# Patient Record
Sex: FEMALE | Race: OTHER
[De-identification: names, ages, dates, MRNs, and addresses within clinical notes are randomized per-mention and may not be internally consistent; named-entity substitution may affect disease eponyms.]

---

## 2017-02-17 ENCOUNTER — APPOINTMENT (OUTPATIENT)
Dept: FAMILY MEDICINE | Facility: CLINIC | Age: 52
End: 2017-02-17

## 2017-02-17 VITALS
SYSTOLIC BLOOD PRESSURE: 130 MMHG | WEIGHT: 267 LBS | HEIGHT: 64 IN | BODY MASS INDEX: 45.58 KG/M2 | DIASTOLIC BLOOD PRESSURE: 80 MMHG

## 2017-02-17 RX ORDER — ONDANSETRON 4 MG/1
4 TABLET, ORALLY DISINTEGRATING ORAL
Qty: 6 | Refills: 0 | Status: COMPLETED | COMMUNITY
Start: 2016-08-15

## 2017-02-18 ENCOUNTER — RX RENEWAL (OUTPATIENT)
Age: 52
End: 2017-02-18

## 2017-02-23 RX ORDER — MOMETASONE 50 UG/1
50 SPRAY, METERED NASAL TWICE DAILY
Qty: 1 | Refills: 0 | Status: DISCONTINUED | COMMUNITY
Start: 2017-02-18 | End: 2017-02-23

## 2017-03-31 ENCOUNTER — APPOINTMENT (OUTPATIENT)
Dept: FAMILY MEDICINE | Facility: CLINIC | Age: 52
End: 2017-03-31

## 2017-03-31 VITALS
BODY MASS INDEX: 44.53 KG/M2 | WEIGHT: 260.8 LBS | DIASTOLIC BLOOD PRESSURE: 68 MMHG | SYSTOLIC BLOOD PRESSURE: 124 MMHG | HEIGHT: 64 IN

## 2017-03-31 RX ORDER — VENLAFAXINE HYDROCHLORIDE 37.5 MG/1
37.5 CAPSULE, EXTENDED RELEASE ORAL DAILY
Qty: 7 | Refills: 0 | Status: DISCONTINUED | COMMUNITY
Start: 2017-02-17 | End: 2017-03-31

## 2017-03-31 RX ORDER — VENLAFAXINE HYDROCHLORIDE 75 MG/1
75 CAPSULE, EXTENDED RELEASE ORAL DAILY
Qty: 30 | Refills: 1 | Status: DISCONTINUED | COMMUNITY
Start: 2017-02-17 | End: 2017-03-31

## 2017-04-24 ENCOUNTER — RX RENEWAL (OUTPATIENT)
Age: 52
End: 2017-04-24

## 2017-04-26 ENCOUNTER — APPOINTMENT (OUTPATIENT)
Dept: FAMILY MEDICINE | Facility: CLINIC | Age: 52
End: 2017-04-26

## 2017-04-26 VITALS
DIASTOLIC BLOOD PRESSURE: 78 MMHG | BODY MASS INDEX: 44.22 KG/M2 | SYSTOLIC BLOOD PRESSURE: 128 MMHG | WEIGHT: 259 LBS | HEIGHT: 64 IN

## 2017-06-05 ENCOUNTER — LABORATORY RESULT (OUTPATIENT)
Age: 52
End: 2017-06-05

## 2017-06-05 ENCOUNTER — APPOINTMENT (OUTPATIENT)
Dept: FAMILY MEDICINE | Facility: CLINIC | Age: 52
End: 2017-06-05

## 2017-06-05 VITALS
DIASTOLIC BLOOD PRESSURE: 80 MMHG | HEIGHT: 64 IN | SYSTOLIC BLOOD PRESSURE: 130 MMHG | WEIGHT: 257 LBS | BODY MASS INDEX: 43.87 KG/M2

## 2017-06-07 ENCOUNTER — RESULT REVIEW (OUTPATIENT)
Age: 52
End: 2017-06-07

## 2017-06-10 ENCOUNTER — MEDICATION RENEWAL (OUTPATIENT)
Age: 52
End: 2017-06-10

## 2017-06-26 ENCOUNTER — APPOINTMENT (OUTPATIENT)
Dept: FAMILY MEDICINE | Facility: CLINIC | Age: 52
End: 2017-06-26

## 2017-06-26 VITALS
DIASTOLIC BLOOD PRESSURE: 80 MMHG | HEIGHT: 64 IN | SYSTOLIC BLOOD PRESSURE: 130 MMHG | WEIGHT: 245 LBS | BODY MASS INDEX: 41.83 KG/M2

## 2017-07-19 ENCOUNTER — APPOINTMENT (OUTPATIENT)
Dept: FAMILY MEDICINE | Facility: CLINIC | Age: 52
End: 2017-07-19

## 2017-07-19 VITALS
WEIGHT: 240 LBS | BODY MASS INDEX: 40.97 KG/M2 | HEIGHT: 64 IN | SYSTOLIC BLOOD PRESSURE: 130 MMHG | DIASTOLIC BLOOD PRESSURE: 80 MMHG

## 2017-08-09 ENCOUNTER — APPOINTMENT (OUTPATIENT)
Dept: FAMILY MEDICINE | Facility: CLINIC | Age: 52
End: 2017-08-09
Payer: COMMERCIAL

## 2017-08-09 VITALS
HEART RATE: 71 BPM | WEIGHT: 242.25 LBS | RESPIRATION RATE: 17 BRPM | HEIGHT: 64 IN | TEMPERATURE: 99.1 F | DIASTOLIC BLOOD PRESSURE: 83 MMHG | OXYGEN SATURATION: 99 % | SYSTOLIC BLOOD PRESSURE: 133 MMHG | BODY MASS INDEX: 41.36 KG/M2

## 2017-08-09 PROCEDURE — 99214 OFFICE O/P EST MOD 30 MIN: CPT | Mod: 25

## 2017-08-09 PROCEDURE — 87880 STREP A ASSAY W/OPTIC: CPT | Mod: QW

## 2017-08-10 ENCOUNTER — RESULT CHARGE (OUTPATIENT)
Age: 52
End: 2017-08-10

## 2017-08-10 LAB — S PYO AG SPEC QL IA: NEGATIVE

## 2017-09-12 ENCOUNTER — LABORATORY RESULT (OUTPATIENT)
Age: 52
End: 2017-09-12

## 2017-09-13 ENCOUNTER — APPOINTMENT (OUTPATIENT)
Dept: FAMILY MEDICINE | Facility: CLINIC | Age: 52
End: 2017-09-13
Payer: COMMERCIAL

## 2017-09-13 VITALS
WEIGHT: 239 LBS | DIASTOLIC BLOOD PRESSURE: 84 MMHG | HEIGHT: 64 IN | SYSTOLIC BLOOD PRESSURE: 130 MMHG | BODY MASS INDEX: 40.8 KG/M2

## 2017-09-13 DIAGNOSIS — J06.9 ACUTE UPPER RESPIRATORY INFECTION, UNSPECIFIED: ICD-10-CM

## 2017-09-13 DIAGNOSIS — Z87.09 PERSONAL HISTORY OF OTHER DISEASES OF THE RESPIRATORY SYSTEM: ICD-10-CM

## 2017-09-13 PROCEDURE — 36415 COLL VENOUS BLD VENIPUNCTURE: CPT

## 2017-09-13 PROCEDURE — 99214 OFFICE O/P EST MOD 30 MIN: CPT | Mod: 25

## 2017-09-13 RX ORDER — AZITHROMYCIN 250 MG/1
250 TABLET, FILM COATED ORAL
Qty: 6 | Refills: 0 | Status: DISCONTINUED | COMMUNITY
Start: 2017-08-09 | End: 2017-09-13

## 2017-09-13 RX ORDER — CIPROFLOXACIN HYDROCHLORIDE 500 MG/1
500 TABLET, FILM COATED ORAL
Qty: 6 | Refills: 0 | Status: DISCONTINUED | COMMUNITY
Start: 2017-08-09 | End: 2017-09-13

## 2017-09-15 LAB
25(OH)D3 SERPL-MCNC: 25.3 NG/ML
ALBUMIN SERPL ELPH-MCNC: 4.4 G/DL
ALP BLD-CCNC: 108 U/L
ALT SERPL-CCNC: 7 U/L
AMYLASE/CREAT SERPL: 61 U/L
ANION GAP SERPL CALC-SCNC: 18 MMOL/L
AST SERPL-CCNC: 16 U/L
BASOPHILS # BLD AUTO: 0.03 K/UL
BASOPHILS NFR BLD AUTO: 0.4 %
BILIRUB SERPL-MCNC: 0.5 MG/DL
BUN SERPL-MCNC: 15 MG/DL
CALCIUM SERPL-MCNC: 10 MG/DL
CHLORIDE SERPL-SCNC: 103 MMOL/L
CHOLEST SERPL-MCNC: 255 MG/DL
CHOLEST/HDLC SERPL: 3.6 RATIO
CO2 SERPL-SCNC: 23 MMOL/L
CREAT SERPL-MCNC: 0.79 MG/DL
EOSINOPHIL # BLD AUTO: 0.05 K/UL
EOSINOPHIL NFR BLD AUTO: 0.7 %
GLUCOSE SERPL-MCNC: 91 MG/DL
HCT VFR BLD CALC: 40.3 %
HDLC SERPL-MCNC: 71 MG/DL
HGB BLD-MCNC: 14 G/DL
IMM GRANULOCYTES NFR BLD AUTO: 0.1 %
LDLC SERPL CALC-MCNC: 160 MG/DL
LPL SERPL-CCNC: 47 U/L
LYMPHOCYTES # BLD AUTO: 1.91 K/UL
LYMPHOCYTES NFR BLD AUTO: 28.4 %
MAN DIFF?: NORMAL
MCHC RBC-ENTMCNC: 31.6 PG
MCHC RBC-ENTMCNC: 34.7 GM/DL
MCV RBC AUTO: 91 FL
MONOCYTES # BLD AUTO: 0.31 K/UL
MONOCYTES NFR BLD AUTO: 4.6 %
NEUTROPHILS # BLD AUTO: 4.42 K/UL
NEUTROPHILS NFR BLD AUTO: 65.8 %
PLATELET # BLD AUTO: 300 K/UL
POTASSIUM SERPL-SCNC: 3.3 MMOL/L
PROT SERPL-MCNC: 7.5 G/DL
RBC # BLD: 4.43 M/UL
RBC # FLD: 13.1 %
SODIUM SERPL-SCNC: 144 MMOL/L
TRIGL SERPL-MCNC: 121 MG/DL
TSH SERPL-ACNC: 1.62 UIU/ML
VIT B12 SERPL-MCNC: 298 PG/ML
WBC # FLD AUTO: 6.73 K/UL

## 2017-10-13 ENCOUNTER — APPOINTMENT (OUTPATIENT)
Dept: FAMILY MEDICINE | Facility: CLINIC | Age: 52
End: 2017-10-13
Payer: COMMERCIAL

## 2017-10-13 VITALS
WEIGHT: 243 LBS | DIASTOLIC BLOOD PRESSURE: 74 MMHG | SYSTOLIC BLOOD PRESSURE: 120 MMHG | HEIGHT: 64 IN | BODY MASS INDEX: 41.48 KG/M2

## 2017-10-13 PROCEDURE — 99214 OFFICE O/P EST MOD 30 MIN: CPT

## 2017-10-13 RX ORDER — POTASSIUM CHLORIDE 1500 MG/1
20 TABLET, EXTENDED RELEASE ORAL DAILY
Qty: 3 | Refills: 0 | Status: DISCONTINUED | COMMUNITY
Start: 2017-09-15 | End: 2017-10-13

## 2017-10-13 RX ORDER — PANTOPRAZOLE 20 MG/1
20 TABLET, DELAYED RELEASE ORAL DAILY
Qty: 90 | Refills: 0 | Status: DISCONTINUED | COMMUNITY
Start: 2017-08-09 | End: 2017-10-13

## 2017-10-13 RX ORDER — CIPROFLOXACIN HYDROCHLORIDE 500 MG/1
500 TABLET, FILM COATED ORAL
Qty: 6 | Refills: 0 | Status: DISCONTINUED | COMMUNITY
Start: 2017-09-13 | End: 2017-10-13

## 2017-10-17 ENCOUNTER — RESULT REVIEW (OUTPATIENT)
Age: 52
End: 2017-10-17

## 2017-11-14 ENCOUNTER — APPOINTMENT (OUTPATIENT)
Dept: FAMILY MEDICINE | Facility: CLINIC | Age: 52
End: 2017-11-14
Payer: COMMERCIAL

## 2017-11-14 VITALS
DIASTOLIC BLOOD PRESSURE: 90 MMHG | HEART RATE: 73 BPM | BODY MASS INDEX: 40.63 KG/M2 | OXYGEN SATURATION: 99 % | SYSTOLIC BLOOD PRESSURE: 130 MMHG | WEIGHT: 238 LBS | HEIGHT: 64 IN

## 2017-11-14 DIAGNOSIS — Z92.29 PERSONAL HISTORY OF OTHER DRUG THERAPY: ICD-10-CM

## 2017-11-14 PROCEDURE — 99214 OFFICE O/P EST MOD 30 MIN: CPT

## 2017-11-27 LAB
25(OH)D3 SERPL-MCNC: 23.8 NG/ML
ANION GAP SERPL CALC-SCNC: 16 MMOL/L
BUN SERPL-MCNC: 22 MG/DL
CALCIUM SERPL-MCNC: 10.1 MG/DL
CHLORIDE SERPL-SCNC: 98 MMOL/L
CO2 SERPL-SCNC: 23 MMOL/L
CREAT SERPL-MCNC: 0.82 MG/DL
GLUCOSE SERPL-MCNC: 80 MG/DL
POTASSIUM SERPL-SCNC: 4 MMOL/L
SODIUM SERPL-SCNC: 137 MMOL/L

## 2017-12-12 ENCOUNTER — APPOINTMENT (OUTPATIENT)
Dept: FAMILY MEDICINE | Facility: CLINIC | Age: 52
End: 2017-12-12
Payer: COMMERCIAL

## 2017-12-12 VITALS
SYSTOLIC BLOOD PRESSURE: 144 MMHG | OXYGEN SATURATION: 97 % | HEIGHT: 64 IN | BODY MASS INDEX: 41.83 KG/M2 | WEIGHT: 245 LBS | HEART RATE: 77 BPM | DIASTOLIC BLOOD PRESSURE: 100 MMHG

## 2017-12-12 VITALS — SYSTOLIC BLOOD PRESSURE: 120 MMHG | DIASTOLIC BLOOD PRESSURE: 80 MMHG

## 2017-12-12 DIAGNOSIS — F41.9 ANXIETY DISORDER, UNSPECIFIED: ICD-10-CM

## 2017-12-12 PROCEDURE — 99214 OFFICE O/P EST MOD 30 MIN: CPT

## 2018-01-09 ENCOUNTER — APPOINTMENT (OUTPATIENT)
Dept: FAMILY MEDICINE | Facility: CLINIC | Age: 53
End: 2018-01-09
Payer: COMMERCIAL

## 2018-01-09 VITALS
SYSTOLIC BLOOD PRESSURE: 120 MMHG | DIASTOLIC BLOOD PRESSURE: 80 MMHG | OXYGEN SATURATION: 98 % | WEIGHT: 245 LBS | TEMPERATURE: 98.2 F | BODY MASS INDEX: 41.83 KG/M2 | HEART RATE: 69 BPM | HEIGHT: 64 IN

## 2018-01-09 PROCEDURE — 99214 OFFICE O/P EST MOD 30 MIN: CPT

## 2018-01-25 ENCOUNTER — TRANSCRIPTION ENCOUNTER (OUTPATIENT)
Age: 53
End: 2018-01-25

## 2018-02-05 ENCOUNTER — TRANSCRIPTION ENCOUNTER (OUTPATIENT)
Age: 53
End: 2018-02-05

## 2018-02-06 ENCOUNTER — APPOINTMENT (OUTPATIENT)
Dept: FAMILY MEDICINE | Facility: CLINIC | Age: 53
End: 2018-02-06
Payer: COMMERCIAL

## 2018-02-06 VITALS
WEIGHT: 254 LBS | HEIGHT: 64 IN | SYSTOLIC BLOOD PRESSURE: 124 MMHG | BODY MASS INDEX: 43.36 KG/M2 | DIASTOLIC BLOOD PRESSURE: 74 MMHG

## 2018-02-06 PROCEDURE — 99214 OFFICE O/P EST MOD 30 MIN: CPT

## 2018-02-07 ENCOUNTER — RX RENEWAL (OUTPATIENT)
Age: 53
End: 2018-02-07

## 2018-03-06 ENCOUNTER — APPOINTMENT (OUTPATIENT)
Dept: FAMILY MEDICINE | Facility: CLINIC | Age: 53
End: 2018-03-06
Payer: COMMERCIAL

## 2018-03-06 VITALS
OXYGEN SATURATION: 98 % | SYSTOLIC BLOOD PRESSURE: 106 MMHG | DIASTOLIC BLOOD PRESSURE: 84 MMHG | HEIGHT: 64 IN | BODY MASS INDEX: 43.19 KG/M2 | WEIGHT: 253 LBS | HEART RATE: 92 BPM

## 2018-03-06 PROCEDURE — 99214 OFFICE O/P EST MOD 30 MIN: CPT

## 2018-03-06 RX ORDER — CEPHALEXIN 500 MG/1
500 CAPSULE ORAL 3 TIMES DAILY
Qty: 30 | Refills: 0 | Status: COMPLETED | COMMUNITY
Start: 2018-02-06 | End: 2018-03-06

## 2018-04-04 ENCOUNTER — APPOINTMENT (OUTPATIENT)
Dept: FAMILY MEDICINE | Facility: CLINIC | Age: 53
End: 2018-04-04
Payer: COMMERCIAL

## 2018-04-04 VITALS
DIASTOLIC BLOOD PRESSURE: 68 MMHG | BODY MASS INDEX: 42.17 KG/M2 | SYSTOLIC BLOOD PRESSURE: 118 MMHG | WEIGHT: 247 LBS | HEIGHT: 64 IN

## 2018-04-04 PROCEDURE — 99214 OFFICE O/P EST MOD 30 MIN: CPT

## 2018-04-04 RX ORDER — FLUTICASONE PROPIONATE 50 UG/1
50 SPRAY, METERED NASAL DAILY
Qty: 1 | Refills: 2 | Status: DISCONTINUED | COMMUNITY
Start: 2017-02-23 | End: 2018-04-04

## 2018-05-02 ENCOUNTER — APPOINTMENT (OUTPATIENT)
Dept: FAMILY MEDICINE | Facility: CLINIC | Age: 53
End: 2018-05-02
Payer: COMMERCIAL

## 2018-05-02 VITALS
WEIGHT: 256 LBS | BODY MASS INDEX: 43.71 KG/M2 | HEIGHT: 64 IN | SYSTOLIC BLOOD PRESSURE: 114 MMHG | DIASTOLIC BLOOD PRESSURE: 76 MMHG

## 2018-05-02 PROCEDURE — 99214 OFFICE O/P EST MOD 30 MIN: CPT

## 2018-05-02 RX ORDER — SODIUM CHLORIDE 0.65 %
0.65 AEROSOL, SPRAY (ML) NASAL
Qty: 3 | Refills: 0 | Status: DISCONTINUED | OUTPATIENT
Start: 2018-04-04 | End: 2018-05-02

## 2018-05-25 ENCOUNTER — CHART COPY (OUTPATIENT)
Age: 53
End: 2018-05-25

## 2018-06-05 ENCOUNTER — APPOINTMENT (OUTPATIENT)
Dept: FAMILY MEDICINE | Facility: CLINIC | Age: 53
End: 2018-06-05
Payer: COMMERCIAL

## 2018-06-05 VITALS
BODY MASS INDEX: 43.54 KG/M2 | SYSTOLIC BLOOD PRESSURE: 120 MMHG | WEIGHT: 255 LBS | HEIGHT: 64 IN | DIASTOLIC BLOOD PRESSURE: 80 MMHG | HEART RATE: 67 BPM | OXYGEN SATURATION: 96 %

## 2018-06-05 PROCEDURE — 99214 OFFICE O/P EST MOD 30 MIN: CPT

## 2018-06-05 RX ORDER — ONDANSETRON 4 MG/1
4 TABLET ORAL EVERY 4 HOURS
Qty: 6 | Refills: 0 | Status: DISCONTINUED | COMMUNITY
Start: 2018-01-09 | End: 2018-06-05

## 2018-06-06 NOTE — PHYSICAL EXAM
[No Acute Distress] : no acute distress [Normal Sclera/Conjunctiva] : normal sclera/conjunctiva [Normal Oropharynx] : the oropharynx was normal [Supple] : supple [No Lymphadenopathy] : no lymphadenopathy [No Respiratory Distress] : no respiratory distress  [Clear to Auscultation] : lungs were clear to auscultation bilaterally [Normal Rate] : normal rate  [Regular Rhythm] : with a regular rhythm [Normal S1, S2] : normal S1 and S2 [No Murmur] : no murmur heard [No Edema] : there was no peripheral edema [Normal Affect] : the affect was normal [Normal Mood] : the mood was normal [Normal Insight/Judgement] : insight and judgment were intact [de-identified] : Mild increase in lumbar lordosis, nontender to palpation.

## 2018-06-06 NOTE — REVIEW OF SYSTEMS
[Joint Pain] : no joint pain [Back Pain] : back pain [Anxiety] : anxiety [Depression] : no depression [Negative] : Respiratory [FreeTextEntry7] : See HPI

## 2018-06-06 NOTE — HISTORY OF PRESENT ILLNESS
[FreeTextEntry1] : Patient here for medication renewal for Alprazolam,Carisoprodol,Triamterene and Omeprazole. [de-identified] : Anxiety has been controlled on current regimen. Mood has been stable.\par Using Carisoprodol with good results for low back spasm.\par Gerd has been controlled.\par Had EUS with GI on 5/23/18 for evaluation of pancreatic cyst and evaluation of GERD.

## 2018-06-06 NOTE — DATA REVIEWED
[FreeTextEntry1] : Endoscopy from 5/23/18 reviewed. gastric polyps, appeared benign. Pancreatic cyst 3.1 mm head, stable. Fatty liver.

## 2018-07-03 ENCOUNTER — APPOINTMENT (OUTPATIENT)
Dept: FAMILY MEDICINE | Facility: CLINIC | Age: 53
End: 2018-07-03
Payer: COMMERCIAL

## 2018-07-03 VITALS
SYSTOLIC BLOOD PRESSURE: 120 MMHG | BODY MASS INDEX: 45.41 KG/M2 | HEIGHT: 64 IN | HEART RATE: 54 BPM | WEIGHT: 266 LBS | DIASTOLIC BLOOD PRESSURE: 80 MMHG | OXYGEN SATURATION: 99 %

## 2018-07-03 DIAGNOSIS — K44.9 DIAPHRAGMATIC HERNIA W/OUT OBSTRUCTION OR GANGRENE: ICD-10-CM

## 2018-07-03 PROCEDURE — 99214 OFFICE O/P EST MOD 30 MIN: CPT

## 2018-07-03 NOTE — HISTORY OF PRESENT ILLNESS
[FreeTextEntry1] : Patient following up on her Anxiety, Pt needs refills on Zolpidem, Xanax, and her EpiPen. Pt also states she needs a script for antibitocis for a dental procedure she is having [de-identified] : Pt experiences reg anxiety with intermittent panic attacks. Her symptoms are controlled on her current regimen.\par She is continuing to exercise and modify diet.\par Had recent bw.\par Epipen . She is severely allergic to bees.\par Needs dental prophylaxis given hx of left knee arthroplasty.

## 2018-07-03 NOTE — REVIEW OF SYSTEMS
[Joint Pain] : joint pain [Anxiety] : anxiety [Negative] : Integumentary [FreeTextEntry9] : left knee [de-identified] : stable

## 2018-07-03 NOTE — ASSESSMENT
[FreeTextEntry1] : LDL mildly suboptimal. Pt to cont with dietary efforts.\par Cont current regimen.\par F/U 1 month.\par We discussed recent data showing increased association of dementia with long term benzodiazepine use on one study. Recommend pt attempt to wean alprazolam to no more than 1 per day and to consider SSRI tx. Will address again at f/u.

## 2018-07-03 NOTE — PHYSICAL EXAM
[No Acute Distress] : no acute distress [Normal Sclera/Conjunctiva] : normal sclera/conjunctiva [Normal Oropharynx] : the oropharynx was normal [Supple] : supple [No Lymphadenopathy] : no lymphadenopathy [No Respiratory Distress] : no respiratory distress  [Clear to Auscultation] : lungs were clear to auscultation bilaterally [Normal Rate] : normal rate  [Regular Rhythm] : with a regular rhythm [Normal S1, S2] : normal S1 and S2 [No Murmur] : no murmur heard [No Edema] : there was no peripheral edema [Normal Anterior Cervical Nodes] : no anterior cervical lymphadenopathy [Normal Affect] : the affect was normal [Normal Mood] : the mood was normal [Normal Insight/Judgement] : insight and judgment were intact [de-identified] : Mild increase in lumbar lordosis, nontender to palpation.

## 2018-08-03 ENCOUNTER — APPOINTMENT (OUTPATIENT)
Dept: FAMILY MEDICINE | Facility: CLINIC | Age: 53
End: 2018-08-03
Payer: COMMERCIAL

## 2018-08-03 VITALS
DIASTOLIC BLOOD PRESSURE: 70 MMHG | BODY MASS INDEX: 44.56 KG/M2 | OXYGEN SATURATION: 98 % | WEIGHT: 261 LBS | HEIGHT: 64 IN | SYSTOLIC BLOOD PRESSURE: 120 MMHG | HEART RATE: 77 BPM

## 2018-08-03 PROCEDURE — 99213 OFFICE O/P EST LOW 20 MIN: CPT

## 2018-08-03 RX ORDER — AZELASTINE HYDROCHLORIDE 205.5 UG/1
0.15 SPRAY, METERED NASAL TWICE DAILY
Qty: 1 | Refills: 2 | Status: DISCONTINUED | COMMUNITY
Start: 2018-04-04 | End: 2018-08-03

## 2018-08-03 NOTE — PLAN
[FreeTextEntry1] : Pt is stable.\par Cont current regimen.\par Recommend pt do trial of decreasing alprazolam as tolerated.

## 2018-08-03 NOTE — REVIEW OF SYSTEMS
[Joint Pain] : joint pain [Anxiety] : anxiety [Depression] : no depression [Negative] : Integumentary

## 2018-08-03 NOTE — HISTORY OF PRESENT ILLNESS
[FreeTextEntry1] : Patient here for refill on her medication,pt needs Alprazolam refilled. Also pt states she needs Amoxicillin refilled because she has a dental procedure on  08/12/18. [de-identified] : Pt with anxiety episodes daily, relief with Alprazolam. Occ panic. Stressors with some family members. Pt feeling overall positive. She continues to exercise regularly.

## 2018-08-03 NOTE — PHYSICAL EXAM
[No Acute Distress] : no acute distress [Normal Sclera/Conjunctiva] : normal sclera/conjunctiva [Normal Oropharynx] : the oropharynx was normal [Supple] : supple [No Lymphadenopathy] : no lymphadenopathy [No Respiratory Distress] : no respiratory distress  [Clear to Auscultation] : lungs were clear to auscultation bilaterally [Normal Rate] : normal rate  [Regular Rhythm] : with a regular rhythm [Normal S1, S2] : normal S1 and S2 [No Murmur] : no murmur heard [No Edema] : there was no peripheral edema [Normal Affect] : the affect was normal [Normal Mood] : the mood was normal [Normal Insight/Judgement] : insight and judgment were intact

## 2018-09-04 ENCOUNTER — APPOINTMENT (OUTPATIENT)
Dept: FAMILY MEDICINE | Facility: CLINIC | Age: 53
End: 2018-09-04
Payer: COMMERCIAL

## 2018-09-04 VITALS
HEIGHT: 64 IN | WEIGHT: 268 LBS | BODY MASS INDEX: 45.75 KG/M2 | DIASTOLIC BLOOD PRESSURE: 80 MMHG | TEMPERATURE: 98 F | OXYGEN SATURATION: 99 % | SYSTOLIC BLOOD PRESSURE: 110 MMHG | HEART RATE: 68 BPM

## 2018-09-04 DIAGNOSIS — Z86.19 PERSONAL HISTORY OF OTHER INFECTIOUS AND PARASITIC DISEASES: ICD-10-CM

## 2018-09-04 DIAGNOSIS — L08.9 PERSONAL HISTORY OF OTHER INFECTIOUS AND PARASITIC DISEASES: ICD-10-CM

## 2018-09-04 PROCEDURE — 99214 OFFICE O/P EST MOD 30 MIN: CPT

## 2018-09-04 NOTE — PHYSICAL EXAM
[No Acute Distress] : no acute distress [Normal Sclera/Conjunctiva] : normal sclera/conjunctiva [Normal Oropharynx] : the oropharynx was normal [Supple] : supple [No Lymphadenopathy] : no lymphadenopathy [No Respiratory Distress] : no respiratory distress  [Clear to Auscultation] : lungs were clear to auscultation bilaterally [Normal Rate] : normal rate  [Regular Rhythm] : with a regular rhythm [Normal S1, S2] : normal S1 and S2 [No Murmur] : no murmur heard [Soft] : abdomen soft [Non Tender] : non-tender [Non-distended] : non-distended [No Masses] : no abdominal mass palpated [Normal Bowel Sounds] : normal bowel sounds [Normal Anterior Cervical Nodes] : no anterior cervical lymphadenopathy [No Focal Deficits] : no focal deficits [Normal Affect] : the affect was normal [Normal Mood] : the mood was normal [Normal Insight/Judgement] : insight and judgment were intact [de-identified] : Mild increase in lumbar lordosis. Paralumbar tone mild increase

## 2018-09-04 NOTE — ASSESSMENT
[FreeTextEntry1] : Gastroenteritis improving. Increase fluids.\par Return to office if symptoms worsen or persist.\par Trial of Linzess for CIC.\par

## 2018-09-04 NOTE — REVIEW OF SYSTEMS
[Fever] : no fever [Abdominal Pain] : no abdominal pain [Nausea] : no nausea [Constipation] : constipation [Diarrhea] : diarrhea [Vomiting] : vomiting [Joint Pain] : joint pain [Back Pain] : no back pain [Negative] : Genitourinary

## 2018-09-04 NOTE — HISTORY OF PRESENT ILLNESS
[FreeTextEntry1] : Pt is complaining of  vomiting since last night. No fever or diarrhea. Vomited 2x and dry heaves x 1, feeling better. No abd pain. + constipation, chronic. No relief with Metamucil or Miralax.\par Patient here for medication refills. She is requesting a refill on her Alprazolam, Zolpidem and Carisoprodol.\par  [de-identified] : Anxiety is stable. Adequate relief with alprazolam.\par Insomnia persists.\par Low back pain and spasm intermittent. Exercising regularly.

## 2018-09-17 ENCOUNTER — RX RENEWAL (OUTPATIENT)
Age: 53
End: 2018-09-17

## 2018-10-03 ENCOUNTER — APPOINTMENT (OUTPATIENT)
Dept: FAMILY MEDICINE | Facility: CLINIC | Age: 53
End: 2018-10-03
Payer: COMMERCIAL

## 2018-10-03 VITALS
DIASTOLIC BLOOD PRESSURE: 74 MMHG | BODY MASS INDEX: 45.24 KG/M2 | WEIGHT: 265 LBS | SYSTOLIC BLOOD PRESSURE: 120 MMHG | HEIGHT: 64 IN

## 2018-10-03 PROCEDURE — 99214 OFFICE O/P EST MOD 30 MIN: CPT

## 2018-10-03 RX ORDER — AMOXICILLIN 500 MG/1
500 CAPSULE ORAL
Qty: 4 | Refills: 3 | Status: COMPLETED | COMMUNITY
Start: 2018-08-03 | End: 2018-10-03

## 2018-10-03 NOTE — COUNSELING
[Weight management counseling provided] : Weight management [Behavioral health counseling provided] : behavioral health  [Target Wt Loss Goal ___] : Target weight loss goal [unfilled] lbs [Weight Self Once Weekly] : Weight self once weekly [Decrease Portions] : Decrease food portions [___ min/wk activity recommended] : [unfilled] min/wk activity recommended [Walking] : Walking [Sleep ___ hours/day] : Sleep [unfilled] hours/day [Engage in a relaxing activity] : Engage in a relaxing activity [Plan in advance] : Plan in advance [None] : None [ - Behavioral Counseling for Obesity (Face-to-Face for 15 Minutes)] : Behavioral Counseling for Obesity (Face-to-Face for 15 Minutes) [ - Annual Depression Screening] : Annual Depression Screening

## 2018-10-03 NOTE — ASSESSMENT
[FreeTextEntry1] : imp: 52 y/o obese F with anxiety and OA seen today for med refills \par Plan: IStop checked no issues \par renew meds for anxiety no risk of diversion \par advised weight loss and NSAIDS as needed for OA knee pain \par f/u with Dr Lucero jolley

## 2018-10-03 NOTE — REVIEW OF SYSTEMS
[Fever] : no fever [Chills] : no chills [Fatigue] : no fatigue [Hot Flashes] : no hot flashes [Night Sweats] : no night sweats [Recent Change In Weight] : ~T no recent weight change [Chest Pain] : no chest pain [Palpitations] : no palpitations [Leg Claudication] : no leg claudication [Lower Ext Edema] : no lower extremity edema [Orthopnea] : no orthopnea [Paroysmal Nocturnal Dyspnea] : no paroysmal nocturnal dyspnea [Shortness Of Breath] : no shortness of breath [Wheezing] : no wheezing [Cough] : no cough [Dyspnea on Exertion] : no dyspnea on exertion [Abdominal Pain] : no abdominal pain [Nausea] : no nausea [Constipation] : no constipation [Diarrhea] : diarrhea [Vomiting] : no vomiting [Heartburn] : no heartburn [Melena] : no melena [Joint Pain] : joint pain [Joint Stiffness] : no joint stiffness [Joint Swelling] : no joint swelling [Muscle Weakness] : no muscle weakness [Muscle Pain] : no muscle pain [Back Pain] : no back pain [Suicidal] : not suicidal [Insomnia] : no insomnia [Anxiety] : anxiety [Depression] : no depression [Easy Bleeding] : no easy bleeding [Easy Bruising] : no easy bruising [Swollen Glands] : no swollen glands [Negative] : Neurological

## 2018-10-03 NOTE — HEALTH RISK ASSESSMENT
[No falls in past year] : Patient reported no falls in the past year [0] : 2) Feeling down, depressed, or hopeless: Not at all (0) [] : No [de-identified] : none [[id number]] : 0

## 2018-10-03 NOTE — HISTORY OF PRESENT ILLNESS
[FreeTextEntry1] : Pt here for Rx renewals including  Alprazolam.  [de-identified] : has hx of Anxiety that started on 911 and is managed long term by Alprazolam and is doing well on this medication \par she is also using NSAIDS for OA as needed \par her eczema flairs occasionally and she uses RX cream for that and OTC preps \par she is in her usual state of health

## 2018-10-04 ENCOUNTER — APPOINTMENT (OUTPATIENT)
Dept: FAMILY MEDICINE | Facility: CLINIC | Age: 53
End: 2018-10-04
Payer: COMMERCIAL

## 2018-10-04 VITALS
OXYGEN SATURATION: 98 % | DIASTOLIC BLOOD PRESSURE: 76 MMHG | RESPIRATION RATE: 16 BRPM | WEIGHT: 265 LBS | SYSTOLIC BLOOD PRESSURE: 116 MMHG | HEIGHT: 64 IN | HEART RATE: 86 BPM | BODY MASS INDEX: 45.24 KG/M2

## 2018-10-04 DIAGNOSIS — S82.53XA DISPLACED FRACTURE OF MEDIAL MALLEOLUS OF UNSPECIFIED TIBIA, INITIAL ENCOUNTER FOR CLOSED FRACTURE: ICD-10-CM

## 2018-10-04 DIAGNOSIS — Z87.09 PERSONAL HISTORY OF OTHER DISEASES OF THE RESPIRATORY SYSTEM: ICD-10-CM

## 2018-10-04 DIAGNOSIS — Z79.2 LONG TERM (CURRENT) USE OF ANTIBIOTICS: ICD-10-CM

## 2018-10-04 DIAGNOSIS — T63.441A TOXIC EFFECT OF VENOM OF BEES, ACCIDENTAL (UNINTENTIONAL), INITIAL ENCOUNTER: ICD-10-CM

## 2018-10-04 PROCEDURE — 99213 OFFICE O/P EST LOW 20 MIN: CPT

## 2018-10-04 NOTE — HISTORY OF PRESENT ILLNESS
[Congestion] : congestion [Cough] : cough [Sore Throat] : sore throat [Earache (L)] : pain in left ear [Moderate] : moderate [___ Weeks ago] :  [unfilled] weeks ago [Constant] : constant [Chills] : chills [Fever] : fever [FreeTextEntry2] : nausea [FreeTextEntry8] : pt notes pnd, congestion, cough w color, and l ear clogged and been going on for one week, pt notes not improving, pt notes feels restricted in chest, pt without hx of asthma, pt notes fever over weekend, pt doing well,pt cough was forceful, and she had some brief sob this am, pt reports she brought up her cold yesterday and doc would not address as per pt . pt asked to speak w someone in office about yesterdays vist

## 2018-10-04 NOTE — PHYSICAL EXAM
[No Acute Distress] : no acute distress [No Respiratory Distress] : no respiratory distress  [Regular Rhythm] : with a regular rhythm [Soft] : abdomen soft

## 2018-10-04 NOTE — REVIEW OF SYSTEMS
[Shortness Of Breath] : shortness of breath [Cough] : cough [Fever] : no fever [Earache] : no earache [Chest Pain] : no chest pain [Wheezing] : no wheezing

## 2018-10-18 ENCOUNTER — RESULT REVIEW (OUTPATIENT)
Age: 53
End: 2018-10-18

## 2018-11-01 ENCOUNTER — APPOINTMENT (OUTPATIENT)
Dept: FAMILY MEDICINE | Facility: CLINIC | Age: 53
End: 2018-11-01
Payer: COMMERCIAL

## 2018-11-01 VITALS
BODY MASS INDEX: 45.24 KG/M2 | HEIGHT: 64 IN | OXYGEN SATURATION: 99 % | HEART RATE: 60 BPM | WEIGHT: 265 LBS | SYSTOLIC BLOOD PRESSURE: 120 MMHG | DIASTOLIC BLOOD PRESSURE: 70 MMHG

## 2018-11-01 DIAGNOSIS — Z87.09 PERSONAL HISTORY OF OTHER DISEASES OF THE RESPIRATORY SYSTEM: ICD-10-CM

## 2018-11-01 PROCEDURE — 99214 OFFICE O/P EST MOD 30 MIN: CPT

## 2018-11-01 RX ORDER — CLARITHROMYCIN 500 MG/1
500 TABLET, FILM COATED ORAL
Qty: 20 | Refills: 0 | Status: DISCONTINUED | COMMUNITY
Start: 2018-10-04 | End: 2018-11-01

## 2018-11-02 PROBLEM — Z87.09 HISTORY OF ACUTE BRONCHITIS: Status: RESOLVED | Noted: 2018-10-04 | Resolved: 2018-11-02

## 2018-11-02 NOTE — PHYSICAL EXAM
[No Acute Distress] : no acute distress [Normal Sclera/Conjunctiva] : normal sclera/conjunctiva [Normal Oropharynx] : the oropharynx was normal [Supple] : supple [No Lymphadenopathy] : no lymphadenopathy [No Respiratory Distress] : no respiratory distress  [Clear to Auscultation] : lungs were clear to auscultation bilaterally [Normal Rate] : normal rate  [Regular Rhythm] : with a regular rhythm [Normal S1, S2] : normal S1 and S2 [No Murmur] : no murmur heard [___ +] : bilateral [unfilled]U+ pretibial pitting edema [Soft] : abdomen soft [Non Tender] : non-tender [Non-distended] : non-distended [No Masses] : no abdominal mass palpated [Normal Bowel Sounds] : normal bowel sounds [Normal Anterior Cervical Nodes] : no anterior cervical lymphadenopathy [No Focal Deficits] : no focal deficits [Normal Affect] : the affect was normal [Normal Mood] : the mood was normal [Normal Insight/Judgement] : insight and judgment were intact [de-identified] : Mild increase in lumbar lordosis. Paralumbar tone mild increase

## 2018-11-02 NOTE — HISTORY OF PRESENT ILLNESS
[FreeTextEntry1] : Patient here to f/u on her chronic low back and knee pain. Right knee pain s/p TKR has been worse over the past month, and pt using cane for ambulation. Unable to walk a distance without difficulty due to back and knee pain. She is requesting renewal of her handicap parking permit.\par c/o increase b/l lower extremity edema over the past week. No calf pain. Denies increase in dietary sodium. No cp or sob.\par She is requesting medication refills.on her Alprazolam, Carisoprodol, Omeprazole and Zolpidem.\par  [de-identified] : Anxiety has been controlled on current regimen.\par Insomnia has been stable.

## 2018-11-02 NOTE — ASSESSMENT
[FreeTextEntry1] : LE edema likely venous insufficiency with ? increased salt sensitivity.\par Rec pt use Lasix 20 mt/d x 3 days plus Klorcon 20 mEq/d x3 d. Hold traimterene while on Lasix.\par F/U 1 m., sooner if worse, or new symptoms.\par See cardio. Rec echo.\par Handicap parking permit completed for pt.

## 2018-11-29 ENCOUNTER — APPOINTMENT (OUTPATIENT)
Dept: FAMILY MEDICINE | Facility: CLINIC | Age: 53
End: 2018-11-29
Payer: COMMERCIAL

## 2018-11-29 VITALS
BODY MASS INDEX: 46.44 KG/M2 | HEIGHT: 64 IN | HEART RATE: 68 BPM | SYSTOLIC BLOOD PRESSURE: 120 MMHG | OXYGEN SATURATION: 97 % | DIASTOLIC BLOOD PRESSURE: 80 MMHG | WEIGHT: 272 LBS

## 2018-11-29 DIAGNOSIS — K44.9 DIAPHRAGMATIC HERNIA W/OUT OBSTRUCTION OR GANGRENE: ICD-10-CM

## 2018-11-29 PROCEDURE — 99213 OFFICE O/P EST LOW 20 MIN: CPT

## 2018-11-29 RX ORDER — AMOXICILLIN 500 MG/1
500 TABLET, FILM COATED ORAL
Qty: 4 | Refills: 3 | Status: DISCONTINUED | COMMUNITY
Start: 2018-07-03 | End: 2018-11-29

## 2018-12-01 NOTE — PHYSICAL EXAM
[No Acute Distress] : no acute distress [Normal Sclera/Conjunctiva] : normal sclera/conjunctiva [Normal Oropharynx] : the oropharynx was normal [Normal TMs] : both tympanic membranes were normal [Supple] : supple [No Lymphadenopathy] : no lymphadenopathy [No Respiratory Distress] : no respiratory distress  [Clear to Auscultation] : lungs were clear to auscultation bilaterally [Normal Rate] : normal rate  [Regular Rhythm] : with a regular rhythm [Normal S1, S2] : normal S1 and S2 [No Murmur] : no murmur heard [No Edema] : there was no peripheral edema [___ +] : bilateral [unfilled]U+ pretibial pitting edema [No Masses] : no abdominal mass palpated [Normal Anterior Cervical Nodes] : no anterior cervical lymphadenopathy [No Focal Deficits] : no focal deficits [Normal Affect] : the affect was normal [Normal Mood] : the mood was normal [Normal Insight/Judgement] : insight and judgment were intact [de-identified] : Nasal turbinates are erythematous with mild edema. Mucoid nasal d/c noted

## 2018-12-01 NOTE — HISTORY OF PRESENT ILLNESS
[FreeTextEntry1] : Patient here for medication refill. She is requesting a refill on her Alprazolam. \par c/o nasal congestion x several days and cough, dry occ yellow thick nasal d/c. No fever or sob. [de-identified] : Anxiety has been stable. Prone ot occ panic attacks.\par She is exercising regularly.

## 2018-12-07 DIAGNOSIS — M85.80 OTHER SPECIFIED DISORDERS OF BONE DENSITY AND STRUCTURE, UNSPECIFIED SITE: ICD-10-CM

## 2018-12-20 ENCOUNTER — APPOINTMENT (OUTPATIENT)
Dept: FAMILY MEDICINE | Facility: CLINIC | Age: 53
End: 2018-12-20
Payer: COMMERCIAL

## 2018-12-20 VITALS
SYSTOLIC BLOOD PRESSURE: 120 MMHG | OXYGEN SATURATION: 97 % | HEIGHT: 64 IN | DIASTOLIC BLOOD PRESSURE: 80 MMHG | WEIGHT: 271 LBS | HEART RATE: 60 BPM | BODY MASS INDEX: 46.26 KG/M2

## 2018-12-20 PROCEDURE — 99214 OFFICE O/P EST MOD 30 MIN: CPT

## 2018-12-20 RX ORDER — AMOXICILLIN AND CLAVULANATE POTASSIUM 875; 125 MG/1; MG/1
875-125 TABLET, COATED ORAL
Qty: 20 | Refills: 0 | Status: DISCONTINUED | COMMUNITY
Start: 2018-11-29 | End: 2018-12-20

## 2018-12-21 NOTE — HISTORY OF PRESENT ILLNESS
[FreeTextEntry1] : Patient is here to f/u on her chronic low back pain and her anxiety.  Requesting refills on Alprazolam, Carisoprodol and Zolpidem.  [de-identified] : Has chronic, recurrent low back spasms. Relief with carisprodol.\par Staying active and exercising in gym regularly.\par Anxiety controlled on current regimen.\par Insomnia persists.\par

## 2018-12-21 NOTE — REVIEW OF SYSTEMS
[Joint Pain] : joint pain [Back Pain] : back pain [Suicidal] : not suicidal [Anxiety] : anxiety [Depression] : no depression [Negative] : Cardiovascular

## 2018-12-21 NOTE — COUNSELING
[Good understanding] : Patient has a good understanding of lifestyle changes and the steps needed to achieve self management goals [Weight management counseling provided] : Weight management [Healthy eating counseling provided] : healthy eating [Activity counseling provided] : activity

## 2018-12-21 NOTE — ASSESSMENT
[FreeTextEntry1] : Pt is stable.\par D/W pt doing trial of decreased alprazolam for her anxiety. Discussed medical data showing increased risk for dementia with long term benzodiazepine use.

## 2018-12-21 NOTE — PHYSICAL EXAM
[No Acute Distress] : no acute distress [Normal Sclera/Conjunctiva] : normal sclera/conjunctiva [Normal Oropharynx] : the oropharynx was normal [Normal TMs] : both tympanic membranes were normal [Supple] : supple [No Lymphadenopathy] : no lymphadenopathy [No Respiratory Distress] : no respiratory distress  [Clear to Auscultation] : lungs were clear to auscultation bilaterally [Normal Rate] : normal rate  [Regular Rhythm] : with a regular rhythm [Normal S1, S2] : normal S1 and S2 [No Murmur] : no murmur heard [No Edema] : there was no peripheral edema [___ +] : bilateral [unfilled]U+ pretibial pitting edema [No Masses] : no abdominal mass palpated [Normal Anterior Cervical Nodes] : no anterior cervical lymphadenopathy [No Focal Deficits] : no focal deficits [Normal Affect] : the affect was normal [Normal Mood] : the mood was normal [Normal Insight/Judgement] : insight and judgment were intact

## 2019-01-16 ENCOUNTER — APPOINTMENT (OUTPATIENT)
Dept: FAMILY MEDICINE | Facility: CLINIC | Age: 54
End: 2019-01-16

## 2019-01-24 ENCOUNTER — LABORATORY RESULT (OUTPATIENT)
Age: 54
End: 2019-01-24

## 2019-01-24 ENCOUNTER — APPOINTMENT (OUTPATIENT)
Dept: FAMILY MEDICINE | Facility: CLINIC | Age: 54
End: 2019-01-24
Payer: COMMERCIAL

## 2019-01-24 VITALS
SYSTOLIC BLOOD PRESSURE: 120 MMHG | DIASTOLIC BLOOD PRESSURE: 82 MMHG | WEIGHT: 270 LBS | HEIGHT: 64 IN | HEART RATE: 79 BPM | OXYGEN SATURATION: 97 % | BODY MASS INDEX: 46.1 KG/M2

## 2019-01-24 PROCEDURE — 99214 OFFICE O/P EST MOD 30 MIN: CPT

## 2019-01-24 NOTE — PHYSICAL EXAM
[No Acute Distress] : no acute distress [Normal Sclera/Conjunctiva] : normal sclera/conjunctiva [Normal Oropharynx] : the oropharynx was normal [Normal TMs] : both tympanic membranes were normal [Supple] : supple [No Lymphadenopathy] : no lymphadenopathy [No Respiratory Distress] : no respiratory distress  [Clear to Auscultation] : lungs were clear to auscultation bilaterally [Normal Rate] : normal rate  [Regular Rhythm] : with a regular rhythm [Normal S1, S2] : normal S1 and S2 [No Murmur] : no murmur heard [No Edema] : there was no peripheral edema [___ +] : bilateral [unfilled]U+ pretibial pitting edema [Soft] : abdomen soft [Non Tender] : non-tender [Non-distended] : non-distended [No Masses] : no abdominal mass palpated [No HSM] : no HSM [Normal Bowel Sounds] : normal bowel sounds [Normal Anterior Cervical Nodes] : no anterior cervical lymphadenopathy [No Focal Deficits] : no focal deficits [Normal Affect] : the affect was normal [Normal Mood] : the mood was normal [Normal Insight/Judgement] : insight and judgment were intact [de-identified] : L

## 2019-01-24 NOTE — HISTORY OF PRESENT ILLNESS
[FreeTextEntry1] : Pt is here to f/u on her GERD.\par C/o constipation still persistent. BM Q 2-3 days on low dose Linzess with minimal relief. Had colonoscopy 3/1/17.\par Anxiety controlled on current regimen.\par Right knee pain has been worse in the cold weather.\par c/o worsening nasal congestion, dryness, occ facial pressure right maxilla, dry cough. Symptoms resolved while in [place]. [de-identified] : Did not go for bw as previously ordered.

## 2019-01-24 NOTE — REVIEW OF SYSTEMS
[Joint Pain] : joint pain [Anxiety] : anxiety [Depression] : no depression [Negative] : Genitourinary [FreeTextEntry7] : Chronic reflux

## 2019-01-24 NOTE — ASSESSMENT
[FreeTextEntry1] : Increase Linzess to 145 mcg/d.\par Cont Omeprazole.\par Cont alprazolam.\par Add Nasacort for rhinitis.

## 2019-01-30 LAB
AMPHET UR-MCNC: NEGATIVE
BARBITURATES UR-MCNC: NEGATIVE
BENZODIAZ UR-MCNC: NORMAL
COCAINE METAB.OTHER UR-MCNC: NEGATIVE
CREATININE, URINE: 232.1 MG/DL
METHADONE UR-MCNC: NEGATIVE
METHAQUALONE UR-MCNC: NEGATIVE
OPIATES UR-MCNC: NEGATIVE
PCP UR-MCNC: NEGATIVE
PROPOXYPH UR QL: NEGATIVE
THC UR QL: NEGATIVE

## 2019-02-21 ENCOUNTER — APPOINTMENT (OUTPATIENT)
Dept: FAMILY MEDICINE | Facility: CLINIC | Age: 54
End: 2019-02-21
Payer: COMMERCIAL

## 2019-02-21 VITALS
SYSTOLIC BLOOD PRESSURE: 120 MMHG | HEIGHT: 64 IN | DIASTOLIC BLOOD PRESSURE: 70 MMHG | OXYGEN SATURATION: 98 % | BODY MASS INDEX: 48.83 KG/M2 | HEART RATE: 70 BPM | WEIGHT: 286 LBS

## 2019-02-21 PROCEDURE — 99214 OFFICE O/P EST MOD 30 MIN: CPT

## 2019-02-21 RX ORDER — FEXOFENADINE HYDROCHLORIDE 180 MG/1
180 TABLET ORAL DAILY
Qty: 7 | Refills: 0 | Status: DISCONTINUED | COMMUNITY
Start: 2018-11-29 | End: 2019-02-21

## 2019-02-21 RX ORDER — ALBUTEROL SULFATE 90 UG/1
108 (90 BASE) AEROSOL, METERED RESPIRATORY (INHALATION)
Qty: 1 | Refills: 0 | Status: DISCONTINUED | COMMUNITY
Start: 2018-10-04 | End: 2019-02-21

## 2019-02-21 RX ORDER — CLOBETASOL PROPIONATE 0.5 MG/ML
0.05 SOLUTION TOPICAL DAILY
Qty: 1 | Refills: 0 | Status: DISCONTINUED | COMMUNITY
Start: 2017-09-13 | End: 2019-02-21

## 2019-02-21 RX ORDER — POTASSIUM CHLORIDE 1500 MG/1
20 TABLET, EXTENDED RELEASE ORAL
Qty: 3 | Refills: 0 | Status: DISCONTINUED | COMMUNITY
Start: 2018-11-01 | End: 2019-02-21

## 2019-02-21 RX ORDER — TRIAMCINOLONE ACETONIDE 55 UG/1
55 SPRAY, METERED NASAL DAILY
Qty: 1 | Refills: 0 | Status: DISCONTINUED | COMMUNITY
Start: 2019-01-24 | End: 2019-02-21

## 2019-02-21 RX ORDER — TRIAMTERENE AND HYDROCHLOROTHIAZIDE 25; 37.5 MG/1; MG/1
37.5-25 TABLET ORAL DAILY
Qty: 90 | Refills: 1 | Status: DISCONTINUED | COMMUNITY
Start: 2017-04-26 | End: 2019-02-21

## 2019-02-21 NOTE — HISTORY OF PRESENT ILLNESS
[FreeTextEntry1] : Patient following up on her Anxiety. \par She is requesting refills on Alprazolam, Zolpidem and Naproxen.\par c/o worsening nasal congestion, chronic, recurrent. Steroid nasal sprays have been irritating to her nasal lining with burning.No cough. No fever.\par c/o constipation, persistent, no relief with Linzess 145 mcg/d.\par She has been gaining wt. She is adjusting her diet in accordance with recommendations from her . She is exercising regularly.\par Anxiety controlled on current regimen.\par Knees have been hurting more lately. Has dx of RA s/p left knee arthroplasty.

## 2019-02-21 NOTE — ASSESSMENT
[FreeTextEntry1] : Due to see GI in May, 2019 for EUS to surveil pancreatic cyst. Pt reminded to keep her GI f/u.\par Chronic recurrent rhinitis with poor tolerance to nasal steroid. Try Montelukast.\par ENT eval.\par Increase Linzess to 290 mcg/d.\par F/U 1 m.

## 2019-02-21 NOTE — PHYSICAL EXAM
[No Acute Distress] : no acute distress [Normal Sclera/Conjunctiva] : normal sclera/conjunctiva [Normal Oropharynx] : the oropharynx was normal [Normal TMs] : both tympanic membranes were normal [Supple] : supple [No Lymphadenopathy] : no lymphadenopathy [No Respiratory Distress] : no respiratory distress  [Clear to Auscultation] : lungs were clear to auscultation bilaterally [Normal Rate] : normal rate  [Regular Rhythm] : with a regular rhythm [Normal S1, S2] : normal S1 and S2 [No Murmur] : no murmur heard [No Edema] : there was no peripheral edema [___ +] : bilateral [unfilled]U+ pretibial pitting edema [No Masses] : no abdominal mass palpated [Normal Anterior Cervical Nodes] : no anterior cervical lymphadenopathy [No Focal Deficits] : no focal deficits [Normal Affect] : the affect was normal [Normal Mood] : the mood was normal [Normal Insight/Judgement] : insight and judgment were intact [de-identified] : Nasal turbinates with mild pallor and edema. No d/c noted.

## 2019-02-21 NOTE — REVIEW OF SYSTEMS
[Earache] : no earache [Nasal Discharge] : no nasal discharge [Sore Throat] : no sore throat [Postnasal Drip] : no postnasal drip [Constipation] : constipation [Joint Pain] : joint pain [Negative] : Integumentary

## 2019-03-18 ENCOUNTER — RX RENEWAL (OUTPATIENT)
Age: 54
End: 2019-03-18

## 2019-03-21 ENCOUNTER — APPOINTMENT (OUTPATIENT)
Dept: FAMILY MEDICINE | Facility: CLINIC | Age: 54
End: 2019-03-21
Payer: COMMERCIAL

## 2019-03-21 VITALS
BODY MASS INDEX: 47.97 KG/M2 | SYSTOLIC BLOOD PRESSURE: 120 MMHG | HEART RATE: 66 BPM | HEIGHT: 64 IN | WEIGHT: 281 LBS | DIASTOLIC BLOOD PRESSURE: 80 MMHG | OXYGEN SATURATION: 98 %

## 2019-03-21 PROCEDURE — 99214 OFFICE O/P EST MOD 30 MIN: CPT

## 2019-03-21 NOTE — HISTORY OF PRESENT ILLNESS
[FreeTextEntry1] : Patient following up on Anxiety. \par Saw ENT and txed for sinusitis.\par Seeing allergist for skin testing. Office bw here for allergies showed low sensitivity to shrimp and dust. Pt eats shrimp w/o ill effect.\par Requesting refills on Alprazolam,Carisoprodol, Naproxen and Omeprazole.  [de-identified] : Anxiety has been worse lately, related to family discord with sister. Adequate control of symptoms with alprazolam.\par Exercising more over past 2 wks.

## 2019-03-21 NOTE — PHYSICAL EXAM
[No Acute Distress] : no acute distress [Normal Sclera/Conjunctiva] : normal sclera/conjunctiva [Normal Oropharynx] : the oropharynx was normal [Normal TMs] : both tympanic membranes were normal [Supple] : supple [No Lymphadenopathy] : no lymphadenopathy [No Respiratory Distress] : no respiratory distress  [Clear to Auscultation] : lungs were clear to auscultation bilaterally [Normal Rate] : normal rate  [Regular Rhythm] : with a regular rhythm [Normal S1, S2] : normal S1 and S2 [No Murmur] : no murmur heard [No Edema] : there was no peripheral edema [___ +] : bilateral [unfilled]U+ pretibial pitting edema [No Masses] : no abdominal mass palpated [Normal Anterior Cervical Nodes] : no anterior cervical lymphadenopathy [No Focal Deficits] : no focal deficits [Normal Affect] : the affect was normal [Normal Mood] : the mood was normal [Normal Insight/Judgement] : insight and judgment were intact [de-identified] : Nasal turbinates with mild edema. No d/c noted.

## 2019-03-21 NOTE — REVIEW OF SYSTEMS
[Earache] : no earache [Nasal Discharge] : no nasal discharge [Sore Throat] : no sore throat [Postnasal Drip] : postnasal drip [Joint Pain] : joint pain [Muscle Pain] : muscle pain [Anxiety] : anxiety [Depression] : no depression [Negative] : Integumentary [FreeTextEntry9] : chronic low back pain

## 2019-04-15 ENCOUNTER — TRANSCRIPTION ENCOUNTER (OUTPATIENT)
Age: 54
End: 2019-04-15

## 2019-04-17 LAB — CYTOLOGY CVX/VAG DOC THIN PREP: NEGATIVE

## 2019-04-18 ENCOUNTER — APPOINTMENT (OUTPATIENT)
Dept: FAMILY MEDICINE | Facility: CLINIC | Age: 54
End: 2019-04-18
Payer: COMMERCIAL

## 2019-04-18 VITALS
OXYGEN SATURATION: 98 % | WEIGHT: 285 LBS | DIASTOLIC BLOOD PRESSURE: 80 MMHG | HEIGHT: 64 IN | SYSTOLIC BLOOD PRESSURE: 120 MMHG | BODY MASS INDEX: 48.65 KG/M2 | HEART RATE: 60 BPM

## 2019-04-18 PROCEDURE — 99214 OFFICE O/P EST MOD 30 MIN: CPT

## 2019-04-18 NOTE — HISTORY OF PRESENT ILLNESS
[FreeTextEntry1] : Patient f/u on her anxiety. \par Requesting refills on Alprazolam and Zolpidem. \par She is continuing with Allergy w/u. Feeling better since tx for bacterial sinusitis.\par Anxiety has been well controlled on current regimen.\par Sustained recent fx of 2 toes left foot while moving boxes. She was advised by podiatrist to abstain from kickboxing for 6-8 wks.

## 2019-04-18 NOTE — REVIEW OF SYSTEMS
[Insomnia] : insomnia [Anxiety] : anxiety [Negative] : Integumentary [Depression] : no depression [FreeTextEntry9] : toe fx x 2, left foot

## 2019-04-18 NOTE — PHYSICAL EXAM
[No Acute Distress] : no acute distress [Normal Sclera/Conjunctiva] : normal sclera/conjunctiva [Normal Oropharynx] : the oropharynx was normal [Normal TMs] : both tympanic membranes were normal [No Lymphadenopathy] : no lymphadenopathy [Supple] : supple [Clear to Auscultation] : lungs were clear to auscultation bilaterally [No Respiratory Distress] : no respiratory distress  [Normal Rate] : normal rate  [Normal S1, S2] : normal S1 and S2 [Regular Rhythm] : with a regular rhythm [No Edema] : there was no peripheral edema [No Murmur] : no murmur heard [No Masses] : no abdominal mass palpated [Normal Anterior Cervical Nodes] : no anterior cervical lymphadenopathy [___ +] : bilateral [unfilled]U+ pretibial pitting edema [Normal Affect] : the affect was normal [No Focal Deficits] : no focal deficits [Normal Mood] : the mood was normal [Normal Insight/Judgement] : insight and judgment were intact [de-identified] : Nasal turbinates with mild edema. No d/c noted.

## 2019-05-15 ENCOUNTER — APPOINTMENT (OUTPATIENT)
Dept: FAMILY MEDICINE | Facility: CLINIC | Age: 54
End: 2019-05-15
Payer: COMMERCIAL

## 2019-05-15 VITALS
HEIGHT: 64 IN | SYSTOLIC BLOOD PRESSURE: 122 MMHG | HEART RATE: 70 BPM | BODY MASS INDEX: 50.02 KG/M2 | OXYGEN SATURATION: 98 % | WEIGHT: 293 LBS | DIASTOLIC BLOOD PRESSURE: 82 MMHG

## 2019-05-15 PROCEDURE — 99214 OFFICE O/P EST MOD 30 MIN: CPT

## 2019-05-16 ENCOUNTER — APPOINTMENT (OUTPATIENT)
Dept: FAMILY MEDICINE | Facility: CLINIC | Age: 54
End: 2019-05-16
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
## 2019-05-17 NOTE — REVIEW OF SYSTEMS
[Joint Pain] : joint pain [Muscle Pain] : muscle pain [Negative] : Psychiatric [FreeTextEntry4] : Still with sinus congestion due to seasonal allergies and s/p bacterial sinusitis.

## 2019-05-17 NOTE — PHYSICAL EXAM
[No Acute Distress] : no acute distress [Normal Oropharynx] : the oropharynx was normal [Normal Sclera/Conjunctiva] : normal sclera/conjunctiva [Supple] : supple [Normal TMs] : both tympanic membranes were normal [No Lymphadenopathy] : no lymphadenopathy [No Respiratory Distress] : no respiratory distress  [Clear to Auscultation] : lungs were clear to auscultation bilaterally [Regular Rhythm] : with a regular rhythm [Normal Rate] : normal rate  [Normal S1, S2] : normal S1 and S2 [No Murmur] : no murmur heard [No Edema] : there was no peripheral edema [___ +] : bilateral [unfilled]U+ pretibial pitting edema [No Masses] : no abdominal mass palpated [No Focal Deficits] : no focal deficits [Normal Anterior Cervical Nodes] : no anterior cervical lymphadenopathy [Normal Affect] : the affect was normal [Normal Mood] : the mood was normal [Normal Insight/Judgement] : insight and judgment were intact [de-identified] : Nasal turbinates with mild edema. No d/c noted.

## 2019-05-17 NOTE — ASSESSMENT
[FreeTextEntry1] : Temporary handicap parking pass form completed for pt.\par Pt reminded to f/u with GI for surveillance of her pancreatic cyst.\par F/U  1  month.

## 2019-06-03 ENCOUNTER — APPOINTMENT (OUTPATIENT)
Dept: OBGYN | Facility: CLINIC | Age: 54
End: 2019-06-03
Payer: COMMERCIAL

## 2019-06-03 VITALS
DIASTOLIC BLOOD PRESSURE: 80 MMHG | SYSTOLIC BLOOD PRESSURE: 120 MMHG | HEIGHT: 64 IN | BODY MASS INDEX: 50.02 KG/M2 | WEIGHT: 293 LBS

## 2019-06-03 LAB
BILIRUB UR QL STRIP: NORMAL
GLUCOSE UR-MCNC: NORMAL
HCG UR QL: 0.2 EU/DL
HGB UR QL STRIP.AUTO: ABNORMAL
KETONES UR-MCNC: NORMAL
LEUKOCYTE ESTERASE UR QL STRIP: ABNORMAL
NITRITE UR QL STRIP: NORMAL
PH UR STRIP: 5
PROT UR STRIP-MCNC: ABNORMAL
SP GR UR STRIP: 1.02

## 2019-06-03 PROCEDURE — 99213 OFFICE O/P EST LOW 20 MIN: CPT

## 2019-06-03 PROCEDURE — 81003 URINALYSIS AUTO W/O SCOPE: CPT | Mod: QW

## 2019-06-03 RX ORDER — FUROSEMIDE 20 MG/1
20 TABLET ORAL DAILY
Qty: 3 | Refills: 0 | Status: DISCONTINUED | COMMUNITY
Start: 2018-11-01 | End: 2019-06-03

## 2019-06-03 RX ORDER — MONTELUKAST 10 MG/1
10 TABLET, FILM COATED ORAL DAILY
Qty: 90 | Refills: 0 | Status: DISCONTINUED | COMMUNITY
Start: 2019-02-21 | End: 2019-06-03

## 2019-06-03 RX ORDER — CEFDINIR 300 MG/1
300 CAPSULE ORAL
Qty: 42 | Refills: 0 | Status: DISCONTINUED | COMMUNITY
Start: 2019-04-15 | End: 2019-06-03

## 2019-06-03 RX ORDER — CALCIPOTRIENE AND BETAMETHASONE DIPROPIONATE 50; .5 UG/G; MG/G
0.005-0.064 SUSPENSION TOPICAL
Qty: 60 | Refills: 0 | Status: DISCONTINUED | COMMUNITY
Start: 2019-02-06 | End: 2019-06-03

## 2019-06-03 RX ORDER — LINACLOTIDE 290 UG/1
290 CAPSULE, GELATIN COATED ORAL
Qty: 90 | Refills: 0 | Status: DISCONTINUED | COMMUNITY
Start: 2018-09-04 | End: 2019-06-03

## 2019-06-03 NOTE — PHYSICAL EXAM
[Alert] : alert [Awake] : awake [Soft] : soft [Tender] : non tender [Distended] : not distended [Oriented x3] : oriented to person, place, and time [Normal] : cervix [No Bleeding] : there was no active vaginal bleeding [Tenderness] : nontender [Uterine Adnexae] : were not tender and not enlarged [FreeTextEntry5] : had some bleeding from the os with q tip

## 2019-06-03 NOTE — DISCUSSION/SUMMARY
[FreeTextEntry1] : We discussed various causes of postmenopausal bleeding.  She is recommended to return for a pelvic US and MD consult visit for a hysteroscopy and endometrial biopsy.  She had a hysteroscopy in office in the past and states this was very painful.\par \par urine culture obtained.\par \par She will call if bleeding is heavy- she is not actively bleeding at this time.\par

## 2019-06-03 NOTE — HISTORY OF PRESENT ILLNESS
[___ Month(s) Ago] : [unfilled] month(s) ago [Good] : being in good health [Regular Exercise] : regular exercise [Healthy Diet] : a healthy diet [Last Mammogram ___] : Last Mammogram was [unfilled] [Last Bone Density ___] : Last bone density studies [unfilled] [Last Colonoscopy ___] : Last colonoscopy [unfilled] [Last Pap ___] : Last cervical pap smear was [unfilled] [Postmenopausal] : is postmenopausal [Pregnancy History] : pregnancy history: [Total Preg ___] : [unfilled] [Definite:  ___ (Date)] : the last menstrual period was [unfilled] [Menarche Age: ____] : age at menarche was [unfilled] [Monogamous] : is monogamous [Sexually Active] : is sexually active [Male ___] : [unfilled] male [Weight Concerns] : no concerns with her weight [de-identified] : pelvic us 4/9/2014 [Contraception] : does not use contraception

## 2019-06-04 ENCOUNTER — RECORD ABSTRACTING (OUTPATIENT)
Age: 54
End: 2019-06-04

## 2019-06-04 DIAGNOSIS — Z87.898 PERSONAL HISTORY OF OTHER SPECIFIED CONDITIONS: ICD-10-CM

## 2019-06-04 DIAGNOSIS — Z82.49 FAMILY HISTORY OF ISCHEMIC HEART DISEASE AND OTHER DISEASES OF THE CIRCULATORY SYSTEM: ICD-10-CM

## 2019-06-04 LAB
CANDIDA VAG CYTO: NOT DETECTED
G VAGINALIS+PREV SP MTYP VAG QL MICRO: NOT DETECTED
T VAGINALIS VAG QL WET PREP: NOT DETECTED

## 2019-06-05 ENCOUNTER — APPOINTMENT (OUTPATIENT)
Dept: OBGYN | Facility: CLINIC | Age: 54
End: 2019-06-05
Payer: COMMERCIAL

## 2019-06-05 ENCOUNTER — MED ADMIN CHARGE (OUTPATIENT)
Age: 54
End: 2019-06-05

## 2019-06-05 ENCOUNTER — ASOB RESULT (OUTPATIENT)
Age: 54
End: 2019-06-05

## 2019-06-05 ENCOUNTER — LABORATORY RESULT (OUTPATIENT)
Age: 54
End: 2019-06-05

## 2019-06-05 VITALS
DIASTOLIC BLOOD PRESSURE: 80 MMHG | WEIGHT: 293 LBS | SYSTOLIC BLOOD PRESSURE: 124 MMHG | HEIGHT: 64 IN | BODY MASS INDEX: 50.02 KG/M2

## 2019-06-05 LAB
BILIRUB UR QL STRIP: NORMAL
GLUCOSE UR-MCNC: NORMAL
HCG UR QL: 0.2 EU/DL
HGB UR QL STRIP.AUTO: ABNORMAL
KETONES UR-MCNC: NORMAL
LEUKOCYTE ESTERASE UR QL STRIP: ABNORMAL
NITRITE UR QL STRIP: NORMAL
PH UR STRIP: 5.5
PROT UR STRIP-MCNC: ABNORMAL
SP GR UR STRIP: 1.02

## 2019-06-05 PROCEDURE — 81003 URINALYSIS AUTO W/O SCOPE: CPT | Mod: QW

## 2019-06-05 PROCEDURE — 99214 OFFICE O/P EST MOD 30 MIN: CPT | Mod: 25

## 2019-06-05 PROCEDURE — 76830 TRANSVAGINAL US NON-OB: CPT

## 2019-06-05 RX ORDER — CEFTRIAXONE 250 MG/1
250 INJECTION, POWDER, FOR SOLUTION INTRAMUSCULAR; INTRAVENOUS
Qty: 1 | Refills: 0 | Status: COMPLETED | OUTPATIENT
Start: 2019-06-05

## 2019-06-05 RX ORDER — AZITHROMYCIN 500 MG/1
500 TABLET, FILM COATED ORAL
Refills: 0 | Status: COMPLETED | OUTPATIENT
Start: 2019-06-05

## 2019-06-06 LAB
APPEARANCE: ABNORMAL
BACTERIA UR CULT: NORMAL
BACTERIA: NEGATIVE
BILIRUBIN URINE: NEGATIVE
BLOOD URINE: ABNORMAL
C TRACH RRNA SPEC QL NAA+PROBE: NOT DETECTED
CALCIUM OXALATE CRYSTALS: ABNORMAL
COLOR: YELLOW
GLUCOSE QUALITATIVE U: NEGATIVE
HYALINE CASTS: 3 /LPF
KETONES URINE: NEGATIVE
LEUKOCYTE ESTERASE URINE: ABNORMAL
MICROSCOPIC-UA: NORMAL
N GONORRHOEA RRNA SPEC QL NAA+PROBE: DETECTED
NITRITE URINE: NEGATIVE
PH URINE: 6
PROTEIN URINE: ABNORMAL
RED BLOOD CELLS URINE: 5 /HPF
SOURCE AMPLIFICATION: NORMAL
SPECIFIC GRAVITY URINE: 1.03
SQUAMOUS EPITHELIAL CELLS: 4 /HPF
URINE COMMENTS: NORMAL
UROBILINOGEN URINE: NORMAL
WHITE BLOOD CELLS URINE: 410 /HPF

## 2019-06-07 NOTE — HISTORY OF PRESENT ILLNESS
[Good] : being in good health [Last Mammogram ___] : Last Mammogram was [unfilled] [Last Bone Density ___] : Last bone density studies [unfilled] [Last Pap ___] : Last cervical pap smear was [unfilled] [Last Screen ___] : last STD screen was [unfilled] [Menarche Age: ____] : age at menarche was [unfilled] [Sexually Active] : is sexually active [Monogamous] : is monogamous [Male ___] : [unfilled] male [Chlamydia] : Chlamydia - [Contraception] : does not use contraception [Gonorrhea] : Gonorrhea -

## 2019-06-07 NOTE — REVIEW OF SYSTEMS
[Nl] : Integumentary [Abn Vag Bleeding] : abnormal vaginal bleeding [Fever] : no fever [Chills] : no chills [Vomiting] : no vomiting [Chest Pain] : no chest pain [Dyspnea] : no shortness of breath [Pelvic Pain] : no pelvic pain [Frequency] : no frequency

## 2019-06-07 NOTE — DISCUSSION/SUMMARY
[FreeTextEntry1] : Result of gonorrhea on cervical culture reviewed with pt. I explained that it is an STD. I instructed her to not have sex at this time. I explained that she must notify her partner and he needs to be seen by a physician, tested and treated. She cannot resume intercourse until both treated and tested negative. Prescription for ceftriaxone and zithromax called in to the pharmacy here. She will return for administration of ceftriaxone. \par \par I also explained that she will be contacted by the [facility].\par \par PMB likely secondary to current gonorrhea. Will also need to return for hysteroscopy with biopsy. \par \par We reviewed normal sono.\par \par Repeat urine culture secondary to contamination.

## 2019-06-13 ENCOUNTER — APPOINTMENT (OUTPATIENT)
Dept: FAMILY MEDICINE | Facility: CLINIC | Age: 54
End: 2019-06-13
Payer: COMMERCIAL

## 2019-06-13 VITALS
OXYGEN SATURATION: 99 % | DIASTOLIC BLOOD PRESSURE: 80 MMHG | RESPIRATION RATE: 16 BRPM | HEART RATE: 70 BPM | WEIGHT: 293 LBS | SYSTOLIC BLOOD PRESSURE: 120 MMHG | HEIGHT: 64 IN | BODY MASS INDEX: 50.02 KG/M2

## 2019-06-13 DIAGNOSIS — Z71.89 OTHER SPECIFIED COUNSELING: ICD-10-CM

## 2019-06-13 DIAGNOSIS — Z12.11 ENCOUNTER FOR SCREENING FOR MALIGNANT NEOPLASM OF COLON: ICD-10-CM

## 2019-06-13 DIAGNOSIS — Z92.89 PERSONAL HISTORY OF OTHER MEDICAL TREATMENT: ICD-10-CM

## 2019-06-13 DIAGNOSIS — N39.0 URINARY TRACT INFECTION, SITE NOT SPECIFIED: ICD-10-CM

## 2019-06-13 DIAGNOSIS — Z87.09 PERSONAL HISTORY OF OTHER DISEASES OF THE RESPIRATORY SYSTEM: ICD-10-CM

## 2019-06-13 DIAGNOSIS — Z87.898 PERSONAL HISTORY OF OTHER SPECIFIED CONDITIONS: ICD-10-CM

## 2019-06-13 DIAGNOSIS — M25.569 PAIN IN UNSPECIFIED KNEE: ICD-10-CM

## 2019-06-13 DIAGNOSIS — Z87.2 PERSONAL HISTORY OF DISEASES OF THE SKIN AND SUBCUTANEOUS TISSUE: ICD-10-CM

## 2019-06-13 DIAGNOSIS — S92.912A UNSPECIFIED FRACTURE OF LEFT TOE(S), INITIAL ENCOUNTER FOR CLOSED FRACTURE: ICD-10-CM

## 2019-06-13 LAB — CYTOLOGY CVX/VAG DOC THIN PREP: NORMAL

## 2019-06-13 PROCEDURE — 99214 OFFICE O/P EST MOD 30 MIN: CPT

## 2019-06-13 RX ORDER — CEFTRIAXONE 250 MG/1
250 INJECTION, POWDER, FOR SOLUTION INTRAMUSCULAR; INTRAVENOUS
Qty: 1 | Refills: 0 | Status: COMPLETED | COMMUNITY
Start: 2019-06-05 | End: 2019-06-13

## 2019-06-13 RX ORDER — AZITHROMYCIN 500 MG/1
500 TABLET, FILM COATED ORAL
Qty: 2 | Refills: 0 | Status: DISCONTINUED | COMMUNITY
Start: 2019-06-05 | End: 2019-06-13

## 2019-06-13 RX ORDER — AZELASTINE HYDROCHLORIDE 137 UG/1
0.1 SPRAY, METERED NASAL
Qty: 30 | Refills: 0 | Status: DISCONTINUED | COMMUNITY
Start: 2019-03-07 | End: 2019-06-13

## 2019-06-13 RX ORDER — METHYLPREDNISOLONE 4 MG/1
4 TABLET ORAL
Qty: 21 | Refills: 0 | Status: COMPLETED | COMMUNITY
Start: 2019-03-07 | End: 2019-06-13

## 2019-06-14 NOTE — REVIEW OF SYSTEMS
[Joint Pain] : joint pain [Anxiety] : anxiety [Heartburn] : heartburn [Depression] : no depression [de-identified] : Stable. [Negative] : Respiratory

## 2019-06-14 NOTE — HEALTH RISK ASSESSMENT
[No falls in past year] : Patient reported no falls in the past year [de-identified] : gyn [] : No [de-identified] : active

## 2019-06-14 NOTE — ASSESSMENT
[FreeTextEntry1] : Has not followed up with GI in May as advised. She is reminded to f/u with GI.\par Cont current regimen.

## 2019-06-14 NOTE — HISTORY OF PRESENT ILLNESS
[de-identified] : Pt has hx of joint replacement left knee years ago due to "arthritis" dx by former physician. She has not had formal rheumatology evaluation. She has had some generalized joint aches. Has chronic low back pain. [FreeTextEntry1] : Pt is here for follow up anxiety and GERD. \par Anxiety has been controlled on current regimen..\par She has had some breakthrough GERD symptoms, requesting refill of famotidine.\par Sanjana has not followed with GI last month as advised. She has a pancreatic cyst that requires surveillance.

## 2019-06-14 NOTE — PHYSICAL EXAM
[Normal Sclera/Conjunctiva] : normal sclera/conjunctiva [No Acute Distress] : no acute distress [Normal Oropharynx] : the oropharynx was normal [Supple] : supple [Clear to Auscultation] : lungs were clear to auscultation bilaterally [No Lymphadenopathy] : no lymphadenopathy [No Respiratory Distress] : no respiratory distress  [Normal Rate] : normal rate  [Regular Rhythm] : with a regular rhythm [Normal S1, S2] : normal S1 and S2 [No Edema] : there was no peripheral edema [No Murmur] : no murmur heard [Normal Anterior Cervical Nodes] : no anterior cervical lymphadenopathy [No Focal Deficits] : no focal deficits [No Masses] : no abdominal mass palpated [Normal Affect] : the affect was normal [Normal Mood] : the mood was normal [Normal Insight/Judgement] : insight and judgment were intact [de-identified] : .

## 2019-06-17 ENCOUNTER — MEDICATION RENEWAL (OUTPATIENT)
Age: 54
End: 2019-06-17

## 2019-06-18 LAB
APPEARANCE: ABNORMAL
BACTERIA UR CULT: NORMAL
BILIRUBIN URINE: NEGATIVE
BLOOD URINE: ABNORMAL
COLOR: YELLOW
GLUCOSE QUALITATIVE U: NEGATIVE
KETONES URINE: NEGATIVE
LEUKOCYTE ESTERASE URINE: ABNORMAL
NITRITE URINE: NEGATIVE
PH URINE: 6
PROTEIN URINE: ABNORMAL
SPECIFIC GRAVITY URINE: 1.03
UROBILINOGEN URINE: NORMAL

## 2019-06-25 ENCOUNTER — APPOINTMENT (OUTPATIENT)
Dept: OBGYN | Facility: CLINIC | Age: 54
End: 2019-06-25
Payer: COMMERCIAL

## 2019-06-25 VITALS
BODY MASS INDEX: 50.02 KG/M2 | SYSTOLIC BLOOD PRESSURE: 130 MMHG | WEIGHT: 293 LBS | HEIGHT: 64 IN | DIASTOLIC BLOOD PRESSURE: 78 MMHG

## 2019-06-25 LAB
BILIRUB UR QL STRIP: NORMAL
GLUCOSE UR-MCNC: NORMAL
HCG UR QL: 0.2 EU/DL
HGB UR QL STRIP.AUTO: NORMAL
KETONES UR-MCNC: ABNORMAL
LEUKOCYTE ESTERASE UR QL STRIP: NORMAL
NITRITE UR QL STRIP: NORMAL
PH UR STRIP: 5.5
PROT UR STRIP-MCNC: NORMAL
SP GR UR STRIP: 1.03

## 2019-06-25 PROCEDURE — 58558Z: CUSTOM

## 2019-06-25 PROCEDURE — 81003 URINALYSIS AUTO W/O SCOPE: CPT | Mod: QW

## 2019-06-25 PROCEDURE — 99212 OFFICE O/P EST SF 10 MIN: CPT | Mod: 25

## 2019-06-27 ENCOUNTER — TRANSCRIPTION ENCOUNTER (OUTPATIENT)
Age: 54
End: 2019-06-27

## 2019-06-27 LAB
C TRACH RRNA SPEC QL NAA+PROBE: NOT DETECTED
N GONORRHOEA RRNA SPEC QL NAA+PROBE: NOT DETECTED
SOURCE AMPLIFICATION: NORMAL

## 2019-06-28 ENCOUNTER — TRANSCRIPTION ENCOUNTER (OUTPATIENT)
Age: 54
End: 2019-06-28

## 2019-06-28 LAB — CORE LAB BIOPSY: NORMAL

## 2019-07-02 ENCOUNTER — RX RENEWAL (OUTPATIENT)
Age: 54
End: 2019-07-02

## 2019-07-18 ENCOUNTER — APPOINTMENT (OUTPATIENT)
Dept: FAMILY MEDICINE | Facility: CLINIC | Age: 54
End: 2019-07-18
Payer: COMMERCIAL

## 2019-07-18 VITALS
SYSTOLIC BLOOD PRESSURE: 110 MMHG | BODY MASS INDEX: 48.4 KG/M2 | WEIGHT: 283.5 LBS | DIASTOLIC BLOOD PRESSURE: 88 MMHG | OXYGEN SATURATION: 97 % | HEIGHT: 64 IN | HEART RATE: 73 BPM

## 2019-07-18 DIAGNOSIS — J32.4 CHRONIC PANSINUSITIS: ICD-10-CM

## 2019-07-18 PROCEDURE — 99214 OFFICE O/P EST MOD 30 MIN: CPT

## 2019-07-18 NOTE — HISTORY OF PRESENT ILLNESS
[FreeTextEntry1] : Patient presents to follow anxiety. Anxiety controlled on current regimen with alprazolam. Has occasional panic attacks. Main trigger is stress, but occ attacks seem to occur w/o trigger.\par c/o increased right knee pain. She will be seeing Dr. Brown about it. She has a hx of left knee replacement,  told she had RA many yrs ago. Has not seen rheumatologist. Pains increasing. Also with chronic low back pain.\par Patient saw Dr. Azar on 07/16/2019, and will be having and endoscopy 08/29/2019 to follow her pancreatic cyst.

## 2019-07-18 NOTE — REVIEW OF SYSTEMS
[Joint Pain] : joint pain [Anxiety] : anxiety [Depression] : no depression [Negative] : Respiratory [FreeTextEntry4] : Sinuses have been clear [de-identified] : Denies depressed mood.

## 2019-07-18 NOTE — ASSESSMENT
[FreeTextEntry1] : Pt reminded to have her bw done, as previously ordered.\par Continue current medications.\par See Rheumatology for arthritis w/u.

## 2019-07-18 NOTE — PHYSICAL EXAM
[Normal] : no acute distress, well nourished, well developed and well-appearing [Normal Sclera/Conjunctiva] : normal sclera/conjunctiva [Normal Oropharynx] : the oropharynx was normal [No Lymphadenopathy] : no lymphadenopathy [No Respiratory Distress] : no respiratory distress  [Normal Rate] : normal rate  [Regular Rhythm] : with a regular rhythm [Normal S1, S2] : normal S1 and S2 [No Murmur] : no murmur heard [No Edema] : there was no peripheral edema [No Joint Swelling] : no joint swelling [No Rash] : no rash [No Focal Deficits] : no focal deficits [Normal Affect] : the affect was normal [Normal Mood] : the mood was normal

## 2019-08-12 ENCOUNTER — RX RENEWAL (OUTPATIENT)
Age: 54
End: 2019-08-12

## 2019-08-15 ENCOUNTER — APPOINTMENT (OUTPATIENT)
Dept: FAMILY MEDICINE | Facility: CLINIC | Age: 54
End: 2019-08-15
Payer: COMMERCIAL

## 2019-08-15 VITALS
OXYGEN SATURATION: 98 % | HEIGHT: 64 IN | WEIGHT: 282 LBS | DIASTOLIC BLOOD PRESSURE: 90 MMHG | SYSTOLIC BLOOD PRESSURE: 134 MMHG | BODY MASS INDEX: 48.14 KG/M2 | HEART RATE: 70 BPM

## 2019-08-15 DIAGNOSIS — Z87.42 PERSONAL HISTORY OF OTHER DISEASES OF THE FEMALE GENITAL TRACT: ICD-10-CM

## 2019-08-15 PROCEDURE — 99214 OFFICE O/P EST MOD 30 MIN: CPT

## 2019-08-15 RX ORDER — LINACLOTIDE 145 UG/1
145 CAPSULE, GELATIN COATED ORAL
Qty: 30 | Refills: 0 | Status: DISCONTINUED | COMMUNITY
Start: 2019-01-24 | End: 2019-08-15

## 2019-08-15 NOTE — PHYSICAL EXAM
[Normal Sclera/Conjunctiva] : normal sclera/conjunctiva [No Lymphadenopathy] : no lymphadenopathy [Normal Oropharynx] : the oropharynx was normal [Supple] : supple [No Respiratory Distress] : no respiratory distress  [Clear to Auscultation] : lungs were clear to auscultation bilaterally [Normal Rate] : normal rate  [Normal S1, S2] : normal S1 and S2 [Regular Rhythm] : with a regular rhythm [No Murmur] : no murmur heard [No Edema] : there was no peripheral edema [Obese] : obese [Soft, Nontender] : the abdomen was soft and nontender [Normal] : normal to percussion [No Mass] : no masses were palpated [No HSM] : no hepatosplenomegaly noted [None] : no CVA tenderness [No Joint Swelling] : no joint swelling [No Rash] : no rash [No Focal Deficits] : no focal deficits [Normal Affect] : the affect was normal [Normal Mood] : the mood was normal

## 2019-08-15 NOTE — REVIEW OF SYSTEMS
[Fever] : no fever [Abdominal Pain] : no abdominal pain [Constipation] : constipation [Nausea] : nausea [Vomiting] : vomiting [Diarrhea] : diarrhea [Joint Pain] : joint pain [Anxiety] : anxiety [Negative] : Genitourinary

## 2019-08-15 NOTE — PLAN
[FreeTextEntry1] : Increase fluids.\par [place] diet as tolerated.\par F/U with GI for surveillance of pancreatic cyst.\par F/u with orthopedist regarding chronic right knee pain. She states she had an injection to her right knee which provided some relief.\par See Rheumatology for formal arthritis w/u.\par F/U 1 month.

## 2019-08-15 NOTE — HISTORY OF PRESENT ILLNESS
[FreeTextEntry1] : Pt c/o nausea and upset stomach since yesterday, states she cannot hold anything down x 1 day. Tolerating liquids today. No fever, abd pain or diarrhea. Has chronic constipation. No close contacts with similar symptoms. Due to see GI next week for f/u of her pancreatic cyst and planning on upper endoscopy, poss EUS.\par Requesting a refill on her Alprazolam, zolpidem and Carisoprodol.\par c/o worsening right knee pain. Right knee replacement recommended by ortho, according to pt.\par c/o anxiety increased lately. Had break up with long term partner recently. Feels she is coping with current change.

## 2019-09-19 ENCOUNTER — APPOINTMENT (OUTPATIENT)
Dept: FAMILY MEDICINE | Facility: CLINIC | Age: 54
End: 2019-09-19
Payer: COMMERCIAL

## 2019-09-19 VITALS
RESPIRATION RATE: 14 BRPM | WEIGHT: 281 LBS | OXYGEN SATURATION: 98 % | HEART RATE: 72 BPM | DIASTOLIC BLOOD PRESSURE: 84 MMHG | BODY MASS INDEX: 47.97 KG/M2 | SYSTOLIC BLOOD PRESSURE: 124 MMHG | HEIGHT: 64 IN

## 2019-09-19 DIAGNOSIS — Z86.19 PERSONAL HISTORY OF OTHER INFECTIOUS AND PARASITIC DISEASES: ICD-10-CM

## 2019-09-19 DIAGNOSIS — M54.5 LOW BACK PAIN: ICD-10-CM

## 2019-09-19 PROCEDURE — 99214 OFFICE O/P EST MOD 30 MIN: CPT

## 2019-09-20 NOTE — ASSESSMENT
[FreeTextEntry1] : For URI:\par Rest, increase fluids.\par Vitamin C 500 mg 1 tab/d.\par Tylenol prn.\par Return to office if symptoms worsen or persist.\par

## 2019-09-20 NOTE — HEALTH RISK ASSESSMENT
[Never (0 pts)] : Never (0 points) [No] : In the past 12 months have you used drugs other than those required for medical reasons? No [] : No [de-identified] : GI [Audit-CScore] : 0

## 2019-09-20 NOTE — REVIEW OF SYSTEMS
[Fever] : no fever [Fatigue] : fatigue [Discharge] : discharge [Joint Pain] : joint pain [Back Pain] : back pain [Anxiety] : anxiety [Depression] : no depression [Negative] : Integumentary [FreeTextEntry3] : watery [FreeTextEntry7] : reflux

## 2019-09-20 NOTE — HISTORY OF PRESENT ILLNESS
[FreeTextEntry1] : Pt is here for follow up Anxiety and needs RX refills. \par Pt also s/p GI with endoscopic u/s. Had antral erythema, diaphragmatic hernia, fatty liver, normal size duct. Bx neg.\par Pt also c/o runny nose and watery eyes X 2 days. +fatigue + occ dry cough.\par Going for 3rd shot to right knee today. It is helping with the chronic pain.\par Gets reflux frequently worse at night. Has best control with Omeprazole 20 mg bid.\par Anxiety controlled with current dose of alprazolam.

## 2019-09-20 NOTE — PHYSICAL EXAM
[Normal Sclera/Conjunctiva] : normal sclera/conjunctiva [Normal Oropharynx] : the oropharynx was normal [Normal TMs] : both tympanic membranes were normal [Normal Nasal Mucosa] : the nasal mucosa was normal [No Lymphadenopathy] : no lymphadenopathy [Supple] : supple [No Respiratory Distress] : no respiratory distress  [Clear to Auscultation] : lungs were clear to auscultation bilaterally [Normal Rate] : normal rate  [Regular Rhythm] : with a regular rhythm [Normal S1, S2] : normal S1 and S2 [No Murmur] : no murmur heard [No Edema] : there was no peripheral edema [Obese] : obese [Soft, Nontender] : the abdomen was soft and nontender [Normal] : normal to percussion [No Mass] : no masses were palpated [No HSM] : no hepatosplenomegaly noted [None] : no CVA tenderness [Normal Anterior Cervical Nodes] : no anterior cervical lymphadenopathy [No Focal Deficits] : no focal deficits [Normal Affect] : the affect was normal [Normal Mood] : the mood was normal [de-identified] : Right knee mildly hypertrophic.

## 2019-10-09 ENCOUNTER — MEDICATION RENEWAL (OUTPATIENT)
Age: 54
End: 2019-10-09

## 2019-10-17 ENCOUNTER — APPOINTMENT (OUTPATIENT)
Dept: FAMILY MEDICINE | Facility: CLINIC | Age: 54
End: 2019-10-17
Payer: COMMERCIAL

## 2019-10-17 VITALS
BODY MASS INDEX: 49.68 KG/M2 | SYSTOLIC BLOOD PRESSURE: 122 MMHG | TEMPERATURE: 98.6 F | HEIGHT: 64 IN | HEART RATE: 81 BPM | WEIGHT: 291 LBS | OXYGEN SATURATION: 96 % | DIASTOLIC BLOOD PRESSURE: 70 MMHG

## 2019-10-17 LAB
FLUAV SPEC QL CULT: NEGATIVE
FLUBV AG SPEC QL IA: NEGATIVE

## 2019-10-17 PROCEDURE — 99214 OFFICE O/P EST MOD 30 MIN: CPT | Mod: 25

## 2019-10-17 PROCEDURE — 87804 INFLUENZA ASSAY W/OPTIC: CPT | Mod: QW

## 2019-10-17 NOTE — REVIEW OF SYSTEMS
[Fatigue] : fatigue [Earache] : no earache [Cough] : cough [Joint Pain] : joint pain [Suicidal] : not suicidal [Negative] : Integumentary [FreeTextEntry2] : See HPI [FreeTextEntry4] : See HPI [FreeTextEntry6] : dry

## 2019-10-17 NOTE — HISTORY OF PRESENT ILLNESS
[FreeTextEntry1] : Pt is here to follow up on her Anxiety, requesting refills on medication. \par Also c/o dry cough, sinus congestion, scratchy throat, excess fatigue and achiness x 2 days. Felt feverish, did not record fever. Had exposure to workers with influenza.

## 2019-10-17 NOTE — ASSESSMENT
[FreeTextEntry1] : Rapid Flu Neg but high clinical suspicion for flu. Will tx empirically with Tamiflu.\par Rest, increase fluids.\par Vitamin C 500 mg 1 tab/d.\par Tylenol prn.\par Return to office if symptoms worsen or persist.\par

## 2019-10-17 NOTE — PHYSICAL EXAM
[No Acute Distress] : no acute distress [Normal Sclera/Conjunctiva] : normal sclera/conjunctiva [Normal TMs] : both tympanic membranes were normal [No Lymphadenopathy] : no lymphadenopathy [Supple] : supple [No Respiratory Distress] : no respiratory distress  [Clear to Auscultation] : lungs were clear to auscultation bilaterally [No Edema] : there was no peripheral edema [No Rash] : no rash [Normal] : affect was normal and insight and judgment were intact [de-identified] : pharynx + erythema. Nasal turbinates are erythematous with mild edema.

## 2019-10-23 ENCOUNTER — APPOINTMENT (OUTPATIENT)
Dept: OBGYN | Facility: CLINIC | Age: 54
End: 2019-10-23
Payer: COMMERCIAL

## 2019-10-23 VITALS
BODY MASS INDEX: 50.02 KG/M2 | WEIGHT: 293 LBS | SYSTOLIC BLOOD PRESSURE: 112 MMHG | DIASTOLIC BLOOD PRESSURE: 78 MMHG | HEIGHT: 64 IN

## 2019-10-23 DIAGNOSIS — Z78.9 OTHER SPECIFIED HEALTH STATUS: ICD-10-CM

## 2019-10-23 PROCEDURE — 99396 PREV VISIT EST AGE 40-64: CPT

## 2019-10-23 RX ORDER — OSELTAMIVIR PHOSPHATE 75 MG/1
75 CAPSULE ORAL TWICE DAILY
Qty: 10 | Refills: 0 | Status: DISCONTINUED | COMMUNITY
Start: 2019-10-17 | End: 2019-10-23

## 2019-10-23 RX ORDER — ONDANSETRON 4 MG/1
4 TABLET, ORALLY DISINTEGRATING ORAL EVERY 6 HOURS
Qty: 8 | Refills: 0 | Status: DISCONTINUED | COMMUNITY
Start: 2019-08-15 | End: 2019-10-23

## 2019-10-23 RX ORDER — TRIAMTERENE AND HYDROCHLOROTHIAZIDE 37.5; 25 MG/1; MG/1
37.5-25 CAPSULE ORAL
Qty: 90 | Refills: 1 | Status: DISCONTINUED | COMMUNITY
End: 2019-10-23

## 2019-10-26 ENCOUNTER — RX RENEWAL (OUTPATIENT)
Age: 54
End: 2019-10-26

## 2019-10-26 NOTE — REVIEW OF SYSTEMS
[Urgency] : urgency [Nocturia] : nocturia [Nl] : Integumentary [Diarrhea] : diarrhea [Anxiety] : anxiety [FreeTextEntry3] : Sinusitis [FreeTextEntry2] : (due to being on plane on 9/11) [FreeTextEntry1] : Psoriosis on scalp

## 2019-10-26 NOTE — HISTORY OF PRESENT ILLNESS
[1 Year Ago] : 1 year ago [Last Mammogram ___] : Last Mammogram was [unfilled] [Last Pap ___] : Last cervical pap smear was [unfilled] [Sexually Active] : is sexually active [Male ___] : [unfilled] male [Definite:  ___ (Date)] : the last menstrual period was [unfilled] [Contraception] : does not use contraception

## 2019-11-03 ENCOUNTER — TRANSCRIPTION ENCOUNTER (OUTPATIENT)
Age: 54
End: 2019-11-03

## 2019-11-03 LAB
ALBUMIN SERPL ELPH-MCNC: 4.3 G/DL
ALP BLD-CCNC: 103 U/L
ALT SERPL-CCNC: 7 U/L
AMYLASE/CREAT SERPL: 61 U/L
ANION GAP SERPL CALC-SCNC: 14 MMOL/L
AST SERPL-CCNC: 16 U/L
BASOPHILS # BLD AUTO: 0.03 K/UL
BASOPHILS NFR BLD AUTO: 0.6 %
BILIRUB SERPL-MCNC: 0.3 MG/DL
BUN SERPL-MCNC: 11 MG/DL
CALCIUM SERPL-MCNC: 9.3 MG/DL
CHLORIDE SERPL-SCNC: 109 MMOL/L
CHOLEST SERPL-MCNC: 234 MG/DL
CHOLEST/HDLC SERPL: 3.3 RATIO
CO2 SERPL-SCNC: 19 MMOL/L
CREAT SERPL-MCNC: 0.66 MG/DL
EOSINOPHIL # BLD AUTO: 0.08 K/UL
EOSINOPHIL NFR BLD AUTO: 1.5 %
ESTIMATED AVERAGE GLUCOSE: 108 MG/DL
GLUCOSE SERPL-MCNC: 103 MG/DL
HBA1C MFR BLD HPLC: 5.4 %
HCT VFR BLD CALC: 40.9 %
HDLC SERPL-MCNC: 70 MG/DL
HGB BLD-MCNC: 13.6 G/DL
IMM GRANULOCYTES NFR BLD AUTO: 0.2 %
LDLC SERPL CALC-MCNC: 146 MG/DL
LPL SERPL-CCNC: 85 U/L
LYMPHOCYTES # BLD AUTO: 1.61 K/UL
LYMPHOCYTES NFR BLD AUTO: 30.8 %
MAN DIFF?: NORMAL
MCHC RBC-ENTMCNC: 30.6 PG
MCHC RBC-ENTMCNC: 33.3 GM/DL
MCV RBC AUTO: 92.1 FL
MONOCYTES # BLD AUTO: 0.25 K/UL
MONOCYTES NFR BLD AUTO: 4.8 %
NEUTROPHILS # BLD AUTO: 3.25 K/UL
NEUTROPHILS NFR BLD AUTO: 62.1 %
PLATELET # BLD AUTO: 267 K/UL
POTASSIUM SERPL-SCNC: 3.8 MMOL/L
PROT SERPL-MCNC: 6.8 G/DL
RBC # BLD: 4.44 M/UL
RBC # FLD: 12.8 %
SODIUM SERPL-SCNC: 142 MMOL/L
TRIGL SERPL-MCNC: 92 MG/DL
TSH SERPL-ACNC: 2.52 UIU/ML
WBC # FLD AUTO: 5.23 K/UL

## 2019-11-05 LAB
CYTOLOGY CVX/VAG DOC THIN PREP: NORMAL
HPV HIGH+LOW RISK DNA PNL CVX: NOT DETECTED

## 2019-11-14 ENCOUNTER — APPOINTMENT (OUTPATIENT)
Dept: FAMILY MEDICINE | Facility: CLINIC | Age: 54
End: 2019-11-14
Payer: COMMERCIAL

## 2019-11-14 VITALS
OXYGEN SATURATION: 97 % | HEIGHT: 64 IN | HEART RATE: 76 BPM | SYSTOLIC BLOOD PRESSURE: 120 MMHG | TEMPERATURE: 98.2 F | DIASTOLIC BLOOD PRESSURE: 80 MMHG

## 2019-11-14 DIAGNOSIS — Z01.419 ENCOUNTER FOR GYNECOLOGICAL EXAMINATION (GENERAL) (ROUTINE) W/OUT ABNORMAL FINDINGS: ICD-10-CM

## 2019-11-14 PROCEDURE — 99214 OFFICE O/P EST MOD 30 MIN: CPT

## 2019-11-14 NOTE — ASSESSMENT
[FreeTextEntry1] : Mild elevation in lipase. Lab was drawn 1.5 months after EUS.\par Recommend repeat lipase today. Pt wishes to defer lab until next month. She states that she is a hard stick and she is not well hydrated this am.\par Handicap parking permit authorized, temporary.\par See rheumatology for evaluation of arthritis and targeted tx.\par For viral URI: Rest, increase fluids.\par Vitamin C 500 mg 1 tab/d.\par Tylenol prn.\par F/U 1 month. Return to office if symptoms worsen or persist.\par

## 2019-11-14 NOTE — PHYSICAL EXAM
[Normal Sclera/Conjunctiva] : normal sclera/conjunctiva [Normal TMs] : both tympanic membranes were normal [No Respiratory Distress] : no respiratory distress  [No Lymphadenopathy] : no lymphadenopathy [Clear to Auscultation] : lungs were clear to auscultation bilaterally [Normal Rate] : normal rate  [Regular Rhythm] : with a regular rhythm [No Murmur] : no murmur heard [Normal S1, S2] : normal S1 and S2 [No Edema] : there was no peripheral edema [Normal] : no posterior cervical lymphadenopathy and no anterior cervical lymphadenopathy [Grossly Normal Strength/Tone] : grossly normal strength/tone [Normal Mood] : the mood was normal [No Focal Deficits] : no focal deficits [de-identified] : pharynx + erythema. Nasal turbinates are erythematous with mild edema.

## 2019-11-14 NOTE — HISTORY OF PRESENT ILLNESS
[FreeTextEntry8] : Pt c/o intermittent  nasal congestion/ sneezing x 1 month. Had occ dry cough. No nasal d/c. No fever. No sob.\par She had influenza-like illness 3 wks ago and had prolonged recovery with symptoms lasting about 2-3 weeks.\par Pt also here for follow up on anxiety/ insomnia and to review recent bw results. Anxiety controlled on current regimen.\par Has chronic low back pain. Limited walking ability due to arthritis especially knees, with some gait instability. Ambulates with cane for distance walking. Pt states she was dxed with rheumatoid arthritis but no record of formal w/u. She does have psoriasis. \par Anxiety controlled on current regimen.\par Pt has mild elevation of Lipase on recent BW. She had EUS on 8/2019 which did not show any cyst on the pancreas. She did have a small umbilicated submucosal lesion in the stomach suspected to be ectopic pancreas and bx taken, but Path report was negative. She is not c/o abd pain or chronic nausea.\par

## 2019-11-14 NOTE — REVIEW OF SYSTEMS
[Fatigue] : fatigue [Sore Throat] : sore throat [Joint Pain] : joint pain [Back Pain] : back pain [Negative] : Genitourinary [Discharge] : no discharge [Chest Pain] : no chest pain

## 2019-12-05 ENCOUNTER — APPOINTMENT (OUTPATIENT)
Dept: FAMILY MEDICINE | Facility: CLINIC | Age: 54
End: 2019-12-05
Payer: COMMERCIAL

## 2019-12-05 ENCOUNTER — MEDICATION RENEWAL (OUTPATIENT)
Age: 54
End: 2019-12-05

## 2019-12-05 VITALS
BODY MASS INDEX: 50.02 KG/M2 | HEIGHT: 64 IN | RESPIRATION RATE: 16 BRPM | TEMPERATURE: 98 F | SYSTOLIC BLOOD PRESSURE: 110 MMHG | OXYGEN SATURATION: 98 % | HEART RATE: 64 BPM | DIASTOLIC BLOOD PRESSURE: 68 MMHG | WEIGHT: 293 LBS

## 2019-12-05 PROCEDURE — 99213 OFFICE O/P EST LOW 20 MIN: CPT

## 2019-12-05 NOTE — HISTORY OF PRESENT ILLNESS
[FreeTextEntry8] : Patient c/o ongoing nasal congestion, fever, (101F) dry cough from, post nasal drip x 2 weeks, associated daily HA, wakes up with tenderness and swelling over cheeks.  Symptoms not improving.

## 2019-12-05 NOTE — PHYSICAL EXAM
[No Acute Distress] : no acute distress [Normal Sclera/Conjunctiva] : normal sclera/conjunctiva [Well Developed] : well developed [EOMI] : extraocular movements intact [Normal Outer Ear/Nose] : the outer ears and nose were normal in appearance [No Respiratory Distress] : no respiratory distress  [Supple] : supple [No Extremity Clubbing/Cyanosis] : no extremity clubbing/cyanosis [Normal Rate] : normal rate  [No Accessory Muscle Use] : no accessory muscle use [Normal] : affect was normal and insight and judgment were intact [de-identified] : Tender over Left Frontal and Maxillary sinuses

## 2019-12-05 NOTE — REVIEW OF SYSTEMS
[Fever] : fever [Fatigue] : fatigue [Negative] : Heme/Lymph [FreeTextEntry4] : facial pain and swelling

## 2019-12-09 ENCOUNTER — APPOINTMENT (OUTPATIENT)
Dept: FAMILY MEDICINE | Facility: CLINIC | Age: 54
End: 2019-12-09
Payer: COMMERCIAL

## 2019-12-09 VITALS — TEMPERATURE: 99.3 F | HEIGHT: 64 IN | OXYGEN SATURATION: 97 % | HEART RATE: 115 BPM

## 2019-12-09 VITALS — DIASTOLIC BLOOD PRESSURE: 70 MMHG | SYSTOLIC BLOOD PRESSURE: 120 MMHG

## 2019-12-09 PROCEDURE — 99213 OFFICE O/P EST LOW 20 MIN: CPT

## 2019-12-09 RX ORDER — AMOXICILLIN 500 MG/1
500 CAPSULE ORAL 3 TIMES DAILY
Qty: 30 | Refills: 0 | Status: DISCONTINUED | COMMUNITY
Start: 2019-12-05 | End: 2019-12-09

## 2019-12-09 NOTE — PHYSICAL EXAM
[No Acute Distress] : no acute distress [Well Developed] : well developed [EOMI] : extraocular movements intact [Normal Sclera/Conjunctiva] : normal sclera/conjunctiva [No Respiratory Distress] : no respiratory distress  [Normal Outer Ear/Nose] : the outer ears and nose were normal in appearance [Supple] : supple [No Accessory Muscle Use] : no accessory muscle use [Normal Rate] : normal rate  [No Extremity Clubbing/Cyanosis] : no extremity clubbing/cyanosis [Normal] : affect was normal and insight and judgment were intact [de-identified] : Tender Left Maxillary sinuses

## 2019-12-09 NOTE — HISTORY OF PRESENT ILLNESS
[FreeTextEntry8] : Pt. C/O severe cough, yellow mucus nasally, coughing up clear phlegm, vomiting, diarrhea, fatigue, sore throat, heaviness in chest,  and head pressure x 3 days.\par Pt. states she has not slept or ate in 3 days. Pt. states she has been taking antibiotics that picked up Friday and states they aren't helping. Facial pain seems a little better, but the lack of sleep is aggravating her.

## 2019-12-09 NOTE — REVIEW OF SYSTEMS
[Postnasal Drip] : postnasal drip [Sore Throat] : sore throat [Cough] : cough [Negative] : Psychiatric

## 2019-12-19 ENCOUNTER — APPOINTMENT (OUTPATIENT)
Dept: FAMILY MEDICINE | Facility: CLINIC | Age: 54
End: 2019-12-19
Payer: COMMERCIAL

## 2019-12-19 VITALS
TEMPERATURE: 98.2 F | HEIGHT: 64 IN | OXYGEN SATURATION: 98 % | HEART RATE: 56 BPM | SYSTOLIC BLOOD PRESSURE: 122 MMHG | DIASTOLIC BLOOD PRESSURE: 82 MMHG

## 2019-12-19 DIAGNOSIS — R74.8 ABNORMAL LEVELS OF OTHER SERUM ENZYMES: ICD-10-CM

## 2019-12-19 DIAGNOSIS — Z67.30 TYPE AB BLOOD, RH POSITIVE: ICD-10-CM

## 2019-12-19 PROCEDURE — 99214 OFFICE O/P EST MOD 30 MIN: CPT | Mod: 25

## 2019-12-19 PROCEDURE — 36415 COLL VENOUS BLD VENIPUNCTURE: CPT

## 2019-12-19 RX ORDER — LEVOFLOXACIN 750 MG/1
750 TABLET, FILM COATED ORAL DAILY
Qty: 7 | Refills: 0 | Status: DISCONTINUED | COMMUNITY
Start: 2019-12-09 | End: 2019-12-19

## 2019-12-19 NOTE — HISTORY OF PRESENT ILLNESS
[FreeTextEntry1] : Pt. is a follow up for anxiety and acid reflux with Rx refills.\par Pt. states she has been on antibiotics for x 1 month and says they do not help. She also states she can't taste or smell anything. c/o cough worse in cold air and occ when speaking. Cough is dry. No fever. No sob but feels she is unable to take a full breath at times. Cough is keeping her up at night and c/o feeling exhausted.\par Has had reflux breakthrough on occasion.\par Anxiety / panic attacks controlled on current tx with prn alprazolam.

## 2019-12-19 NOTE — ASSESSMENT
[FreeTextEntry1] : Bacterial sinusitis with secondary reactive airway disease.\par Start Ventolin 3x/d with Q4 H prn.\par Pt aware not to take her alprazolam or ambien on the evening that she is taking the rx cough medicine..Rx cough medicine for 3 days only.\par Return to office if symptoms worsen or persist.\par

## 2019-12-19 NOTE — PHYSICAL EXAM
[Normal Sclera/Conjunctiva] : normal sclera/conjunctiva [Normal TMs] : both tympanic membranes were normal [No Lymphadenopathy] : no lymphadenopathy [No Respiratory Distress] : no respiratory distress  [Clear to Auscultation] : lungs were clear to auscultation bilaterally [Normal Rate] : normal rate  [Regular Rhythm] : with a regular rhythm [Normal S1, S2] : normal S1 and S2 [No Murmur] : no murmur heard [No Edema] : there was no peripheral edema [Normal] : no posterior cervical lymphadenopathy and no anterior cervical lymphadenopathy [Grossly Normal Strength/Tone] : grossly normal strength/tone [No Focal Deficits] : no focal deficits [Normal Affect] : the affect was normal [Normal Mood] : the mood was normal [de-identified] : pharynx + erythema.  [de-identified] : Mild paralumbar tenderness with palptation

## 2019-12-19 NOTE — REVIEW OF SYSTEMS
[Discharge] : no discharge [Nasal Discharge] : nasal discharge [Sore Throat] : no sore throat [Shortness Of Breath] : shortness of breath [Wheezing] : no wheezing [Cough] : cough [Joint Pain] : joint pain [Back Pain] : back pain [Anxiety] : anxiety [Depression] : no depression [Negative] : Integumentary [FreeTextEntry9] : knee pain

## 2020-01-06 ENCOUNTER — APPOINTMENT (OUTPATIENT)
Dept: RHEUMATOLOGY | Facility: CLINIC | Age: 55
End: 2020-01-06

## 2020-01-06 ENCOUNTER — APPOINTMENT (OUTPATIENT)
Dept: RHEUMATOLOGY | Facility: CLINIC | Age: 55
End: 2020-01-06
Payer: COMMERCIAL

## 2020-01-06 VITALS
DIASTOLIC BLOOD PRESSURE: 83 MMHG | OXYGEN SATURATION: 98 % | HEART RATE: 76 BPM | BODY MASS INDEX: 50.02 KG/M2 | HEIGHT: 64 IN | TEMPERATURE: 98.9 F | SYSTOLIC BLOOD PRESSURE: 149 MMHG | WEIGHT: 293 LBS

## 2020-01-06 DIAGNOSIS — Z82.61 FAMILY HISTORY OF ARTHRITIS: ICD-10-CM

## 2020-01-06 PROCEDURE — 36415 COLL VENOUS BLD VENIPUNCTURE: CPT

## 2020-01-06 PROCEDURE — 99205 OFFICE O/P NEW HI 60 MIN: CPT | Mod: 25

## 2020-01-06 RX ORDER — NYSTATIN 100000 [USP'U]/G
100000 POWDER TOPICAL
Qty: 1 | Refills: 3 | Status: COMPLETED | COMMUNITY
Start: 2019-10-26 | End: 2020-01-06

## 2020-01-06 RX ORDER — HYDROCODONE BITARTRATE AND HOMATROPINE METHYLBROMIDE 5; 1.5 MG/5ML; MG/5ML
5-1.5 SYRUP ORAL
Qty: 30 | Refills: 0 | Status: COMPLETED | COMMUNITY
Start: 2019-12-19 | End: 2020-01-06

## 2020-01-06 RX ORDER — NYSTATIN 100000 [USP'U]/G
100000 CREAM TOPICAL TWICE DAILY
Qty: 1 | Refills: 3 | Status: COMPLETED | COMMUNITY
Start: 2019-10-26 | End: 2020-01-06

## 2020-01-06 NOTE — PHYSICAL EXAM
[General Appearance - In No Acute Distress] : in no acute distress [General Appearance - Alert] : alert [Outer Ear] : the ears and nose were normal in appearance [Oropharynx] : the oropharynx was normal [Sclera] : the sclera and conjunctiva were normal [Neck Appearance] : the appearance of the neck was normal [Jugular Venous Distention Increased] : there was no jugular-venous distention [Neck Cervical Mass (___cm)] : no neck mass was observed [Thyroid Nodule] : there were no palpable thyroid nodules [Thyroid Diffuse Enlargement] : the thyroid was not enlarged [Heart Rate And Rhythm] : heart rate was normal and rhythm regular [Auscultation Breath Sounds / Voice Sounds] : lungs were clear to auscultation bilaterally [Murmurs] : no murmurs [Heart Sounds Gallop] : no gallops [Heart Sounds] : normal S1 and S2 [Heart Sounds Pericardial Friction Rub] : no pericardial rub [Edema] : there was no peripheral edema [Bowel Sounds] : normal bowel sounds [Abdomen Soft] : soft [Abdomen Tenderness] : non-tender [Abdomen Mass (___ Cm)] : no abdominal mass palpated [Cervical Lymph Nodes Enlarged Posterior Bilaterally] : posterior cervical [Cervical Lymph Nodes Enlarged Anterior Bilaterally] : anterior cervical [Supraclavicular Lymph Nodes Enlarged Bilaterally] : supraclavicular [FreeTextEntry1] : right knee w/ (+)tenderness posteriorly, pain upon flexion;  rest of joints w/ no synovitis, normal ROM [Skin Color & Pigmentation] : normal skin color and pigmentation [No Spinal Tenderness] : no spinal tenderness [Skin Turgor] : normal skin turgor [] : no rash [No Focal Deficits] : no focal deficits [Oriented To Time, Place, And Person] : oriented to person, place, and time [Affect] : the affect was normal [Impaired Insight] : insight and judgment were intact

## 2020-01-06 NOTE — ASSESSMENT
[FreeTextEntry1] : 54 year old female presents with worsening right knee pain.  Her overall presentation is most suggestive of osteoarthritis, though the prolonged morning stiffness is suggestive of an inflammatory arthritis, such as psoriatic arthritis (pt w/ hx of psoriasis, currently well controlled).  I have therefore ordered some bloodwork and x-rays as further workup.  She will follow up with me again in 2-3 weeks to review her results.  \par \par

## 2020-01-06 NOTE — HISTORY OF PRESENT ILLNESS
[Arthralgias] : arthralgias [FreeTextEntry1] : 54 year old female with PMHx as listed below reports that she has been experiencing pain in her right knee for "years".  The pain is constant, though worse with standing/walking and better at rest.  She describes the pain as sharp, :"like being stabbed", 9 out of 10.  (+)swelling "behind the knee".   (+)AM stiffness, usually lasting about 4 hours.  She gets some relief from glucosamine and naproxen, and from cold packs.  No other known alleviating factors.  She denies any pain in the rest of her joints.   She saw an orthopedist who reportedly diagnosed her with osteoarthritis.  She was treated with a corticosteroid injection, which provided some relief.  About 3 months ago, she was treated with Supartz injections, which didn't help.  \par Of note, pt was diagnosed with psoriasis several years ago - affects only her scalp, well controlled with topical steroids.\par No F/C, no unintentional weight loss, no night sweats, no oral ulcers, no rashes, no alopecia, no photosensitivity, no dry eyes/dry mouth, no Raynaud symptoms, no focal weakness, no dysphagia \par No hx of uveitis or dactylitis, no inflammatory back pain, no IBD symptoms, no preceding GI/ infection\par  [Anorexia] : no anorexia [Weight Loss] : no weight loss [Malaise] : no malaise [Fever] : no fever [Fatigue] : no fatigue [Chills] : no chills [Malar Facial Rash] : no malar facial rash [Skin Lesions] : no lesions [Oral Ulcers] : no oral ulcers [Skin Nodules] : no skin nodules [Dysphonia] : no dysphonia [Cough] : no cough [Dry Mouth] : no dry mouth [Dysphagia] : no dysphagia [Chest Pain] : no chest pain [Shortness of Breath] : no shortness of breath [Joint Warmth] : no joint warmth [Joint Deformity] : no joint deformity [Joint Swelling] : no joint swelling [Falls] : no falls [Decreased ROM] : no decreased range of motion [Morning Stiffness] : no morning stiffness

## 2020-01-06 NOTE — CONSULT LETTER
[Dear  ___] : Dear  [unfilled], [Consult Closing:] : Thank you very much for allowing me to participate in the care of this patient.  If you have any questions, please do not hesitate to contact me. [Consult Letter:] : I had the pleasure of evaluating your patient, [unfilled]. [Please see my note below.] : Please see my note below. [Sincerely,] : Sincerely, [FreeTextEntry3] : Jamie Brewster MD\par Rheumatology\par [facility]\par  of Medicine\par [employer] [facility] \par \par [address]\[place] [address]\par phone:  [phone]\par fax:      [phone]\par \par [address] [place] [address]\[place] [address]\par phone:  [phone]\par fax:      [phone]\par

## 2020-01-07 LAB
ALBUMIN SERPL ELPH-MCNC: 4.6 G/DL
ALP BLD-CCNC: 115 U/L
ALT SERPL-CCNC: 14 U/L
ANA SER IF-ACNC: NEGATIVE
ANION GAP SERPL CALC-SCNC: 15 MMOL/L
AST SERPL-CCNC: 20 U/L
BASOPHILS # BLD AUTO: 0.04 K/UL
BASOPHILS NFR BLD AUTO: 0.7 %
BILIRUB SERPL-MCNC: 0.4 MG/DL
BUN SERPL-MCNC: 12 MG/DL
CALCIUM SERPL-MCNC: 9.7 MG/DL
CCP AB SER IA-ACNC: <8 UNITS
CHLORIDE SERPL-SCNC: 105 MMOL/L
CO2 SERPL-SCNC: 23 MMOL/L
CREAT SERPL-MCNC: 0.72 MG/DL
CRP SERPL-MCNC: 0.2 MG/DL
EOSINOPHIL # BLD AUTO: 0.08 K/UL
EOSINOPHIL NFR BLD AUTO: 1.3 %
ERYTHROCYTE [SEDIMENTATION RATE] IN BLOOD BY WESTERGREN METHOD: 32 MM/HR
GLUCOSE SERPL-MCNC: 95 MG/DL
HCT VFR BLD CALC: 41.8 %
HGB BLD-MCNC: 13.8 G/DL
IMM GRANULOCYTES NFR BLD AUTO: 0.2 %
LYMPHOCYTES # BLD AUTO: 2.15 K/UL
LYMPHOCYTES NFR BLD AUTO: 35.4 %
MAN DIFF?: NORMAL
MCHC RBC-ENTMCNC: 30.8 PG
MCHC RBC-ENTMCNC: 33 GM/DL
MCV RBC AUTO: 93.3 FL
MONOCYTES # BLD AUTO: 0.31 K/UL
MONOCYTES NFR BLD AUTO: 5.1 %
NEUTROPHILS # BLD AUTO: 3.48 K/UL
NEUTROPHILS NFR BLD AUTO: 57.3 %
PLATELET # BLD AUTO: 268 K/UL
POTASSIUM SERPL-SCNC: 4.2 MMOL/L
PROT SERPL-MCNC: 6.9 G/DL
RBC # BLD: 4.48 M/UL
RBC # FLD: 12.2 %
RF+CCP IGG SER-IMP: NEGATIVE
RHEUMATOID FACT SER QL: 11 IU/ML
SODIUM SERPL-SCNC: 143 MMOL/L
WBC # FLD AUTO: 6.07 K/UL

## 2020-01-13 LAB — HLA-B27 RELATED AG QL: NORMAL

## 2020-01-16 ENCOUNTER — APPOINTMENT (OUTPATIENT)
Dept: FAMILY MEDICINE | Facility: CLINIC | Age: 55
End: 2020-01-16
Payer: COMMERCIAL

## 2020-01-16 VITALS
HEIGHT: 64 IN | DIASTOLIC BLOOD PRESSURE: 88 MMHG | OXYGEN SATURATION: 98 % | WEIGHT: 293 LBS | BODY MASS INDEX: 50.02 KG/M2 | SYSTOLIC BLOOD PRESSURE: 146 MMHG | HEART RATE: 70 BPM

## 2020-01-16 DIAGNOSIS — B96.89 PERSONAL HISTORY OF OTHER DISEASES OF THE RESPIRATORY SYSTEM: ICD-10-CM

## 2020-01-16 DIAGNOSIS — Z87.09 PERSONAL HISTORY OF OTHER DISEASES OF THE RESPIRATORY SYSTEM: ICD-10-CM

## 2020-01-16 DIAGNOSIS — J06.9 ACUTE UPPER RESPIRATORY INFECTION, UNSPECIFIED: ICD-10-CM

## 2020-01-16 PROCEDURE — 99214 OFFICE O/P EST MOD 30 MIN: CPT

## 2020-01-16 NOTE — ASSESSMENT
[FreeTextEntry1] : Restart Dyazide due to elevated blood pressure.\par Cont f/u with rheumatology for arthritis w/u.\par F/U 1 month.

## 2020-01-16 NOTE — HISTORY OF PRESENT ILLNESS
[de-identified] : She has seen the rheumatologist for evaluation of arthralgias. Dx with OA vs poss RA, w/u in progress. [FreeTextEntry1] : Pt c/o of nasal congestion, post nasal drip and nausea x 1 month. No fever, no sob, no cough.\par Pt is here to f/u on low back pain. Adequate control on current regimen.\par Anxiety and occ panic attacks mildly increased this month due to family relationship stressors. Pt coping. \par Adequate control with alprazolam.\par

## 2020-01-16 NOTE — REVIEW OF SYSTEMS
[Anxiety] : anxiety [Muscle Pain] : muscle pain [Joint Pain] : joint pain [Depression] : no depression [Negative] : Integumentary

## 2020-01-24 DIAGNOSIS — M25.461 EFFUSION, RIGHT KNEE: ICD-10-CM

## 2020-01-30 ENCOUNTER — APPOINTMENT (OUTPATIENT)
Dept: RHEUMATOLOGY | Facility: CLINIC | Age: 55
End: 2020-01-30
Payer: COMMERCIAL

## 2020-01-30 VITALS
BODY MASS INDEX: 50.02 KG/M2 | SYSTOLIC BLOOD PRESSURE: 128 MMHG | WEIGHT: 293 LBS | TEMPERATURE: 97.7 F | OXYGEN SATURATION: 97 % | HEIGHT: 64 IN | RESPIRATION RATE: 16 BRPM | DIASTOLIC BLOOD PRESSURE: 92 MMHG | HEART RATE: 105 BPM

## 2020-01-30 DIAGNOSIS — M17.11 UNILATERAL PRIMARY OSTEOARTHRITIS, RIGHT KNEE: ICD-10-CM

## 2020-01-30 PROCEDURE — 99214 OFFICE O/P EST MOD 30 MIN: CPT

## 2020-01-30 NOTE — ASSESSMENT
[FreeTextEntry1] : 54 year old female presents with severe right knee pain due to OA.  Pt also w/ hx of psoriasis though presentation not c/w PsA.\par   - Reiterated importance of exercise\par   - Rx diclofenac 75mg BID prn\par   - wt loss\par   - warm compresses\par   - OTC topical analgesics\par   - knee brace\par   - If no improvement by next visit, pt to consider corticosteroid injection.

## 2020-01-30 NOTE — HISTORY OF PRESENT ILLNESS
[Arthralgias] : arthralgias [FreeTextEntry1] : Feeling "the same" since last visit.  Still w/ right knee pain, unchanged.  No new complaints.  Pt now reports her AM stiffness lasts about 5 minutes and only occurs w/ cold weather. [Anorexia] : no anorexia [Weight Loss] : no weight loss [Malaise] : no malaise [Fever] : no fever [Chills] : no chills [Fatigue] : no fatigue [Malar Facial Rash] : no malar facial rash [Skin Lesions] : no lesions [Skin Nodules] : no skin nodules [Oral Ulcers] : no oral ulcers [Cough] : no cough [Dry Mouth] : no dry mouth [Dysphonia] : no dysphonia [Dysphagia] : no dysphagia [Shortness of Breath] : no shortness of breath [Chest Pain] : no chest pain [Joint Warmth] : no joint warmth [Joint Swelling] : no joint swelling [Joint Deformity] : no joint deformity [Decreased ROM] : no decreased range of motion [Morning Stiffness] : no morning stiffness [Falls] : no falls

## 2020-01-30 NOTE — PHYSICAL EXAM
[General Appearance - Alert] : alert [General Appearance - In No Acute Distress] : in no acute distress [Sclera] : the sclera and conjunctiva were normal [Outer Ear] : the ears and nose were normal in appearance [Oropharynx] : the oropharynx was normal [Neck Appearance] : the appearance of the neck was normal [Neck Cervical Mass (___cm)] : no neck mass was observed [Jugular Venous Distention Increased] : there was no jugular-venous distention [Thyroid Diffuse Enlargement] : the thyroid was not enlarged [Thyroid Nodule] : there were no palpable thyroid nodules [Auscultation Breath Sounds / Voice Sounds] : lungs were clear to auscultation bilaterally [Heart Rate And Rhythm] : heart rate was normal and rhythm regular [Heart Sounds] : normal S1 and S2 [Heart Sounds Gallop] : no gallops [Heart Sounds Pericardial Friction Rub] : no pericardial rub [Murmurs] : no murmurs [Bowel Sounds] : normal bowel sounds [Edema] : there was no peripheral edema [Abdomen Tenderness] : non-tender [Abdomen Soft] : soft [Abdomen Mass (___ Cm)] : no abdominal mass palpated [Cervical Lymph Nodes Enlarged Posterior Bilaterally] : posterior cervical [Cervical Lymph Nodes Enlarged Anterior Bilaterally] : anterior cervical [Supraclavicular Lymph Nodes Enlarged Bilaterally] : supraclavicular [No Spinal Tenderness] : no spinal tenderness [Skin Color & Pigmentation] : normal skin color and pigmentation [] : no rash [Skin Turgor] : normal skin turgor [No Focal Deficits] : no focal deficits [Oriented To Time, Place, And Person] : oriented to person, place, and time [Impaired Insight] : insight and judgment were intact [Affect] : the affect was normal [FreeTextEntry1] : right knee w/ (+)tenderness posteriorly, pain upon flexion;  rest of joints w/ no synovitis, normal ROM

## 2020-02-19 ENCOUNTER — APPOINTMENT (OUTPATIENT)
Dept: FAMILY MEDICINE | Facility: CLINIC | Age: 55
End: 2020-02-19
Payer: COMMERCIAL

## 2020-02-19 VITALS
OXYGEN SATURATION: 98 % | HEIGHT: 64 IN | BODY MASS INDEX: 50.02 KG/M2 | TEMPERATURE: 98.9 F | WEIGHT: 293 LBS | HEART RATE: 78 BPM

## 2020-02-19 PROCEDURE — 99213 OFFICE O/P EST LOW 20 MIN: CPT

## 2020-02-19 NOTE — PHYSICAL EXAM
[Normal Sclera/Conjunctiva] : normal sclera/conjunctiva [Clear to Auscultation] : lungs were clear to auscultation bilaterally [No Respiratory Distress] : no respiratory distress  [No Lymphadenopathy] : no lymphadenopathy [Normal Rate] : normal rate  [Regular Rhythm] : with a regular rhythm [Normal S1, S2] : normal S1 and S2 [No Murmur] : no murmur heard [No Focal Deficits] : no focal deficits [Normal Affect] : the affect was normal [Normal] : affect was normal and insight and judgment were intact [Normal Mood] : the mood was normal [de-identified] : lymphedema

## 2020-02-19 NOTE — COUNSELING
[Benefits of weight loss discussed] : Benefits of weight loss discussed [Encouraged to increase physical activity] : Encouraged to increase physical activity [FreeTextEntry2] : Pt declines nutritionist counseling.\par She is exploring new exercise programs suitable for her physical limitations with OA of right knee.\par  [Good understanding] : Patient has a good understanding of disease, goals and obesity follow-up plan

## 2020-02-19 NOTE — HISTORY OF PRESENT ILLNESS
[FreeTextEntry1] : Patient presents for Rx renewal of Alprazolam as well as Abx Rx for dental prophylaxis.\par Patient states she will be having dental work done 3/1/20 and has a hx of knee replacement.\par c/o worsening right knee pain. She is not able to do kickboxing due to knee pain and increased potential for strain. She states she has gained wt from not exercising as much, but wt record is stable. She saw rheumatologist and dxed with OA right knee and given rx for diclofenac, which is helping.\par Anxiety persists, with occ panic attacks. Adequate relief with alprazolam.

## 2020-03-04 ENCOUNTER — APPOINTMENT (OUTPATIENT)
Dept: FAMILY MEDICINE | Facility: CLINIC | Age: 55
End: 2020-03-04
Payer: COMMERCIAL

## 2020-03-04 VITALS
DIASTOLIC BLOOD PRESSURE: 82 MMHG | OXYGEN SATURATION: 99 % | BODY MASS INDEX: 50.02 KG/M2 | HEIGHT: 64 IN | HEART RATE: 76 BPM | WEIGHT: 293 LBS | SYSTOLIC BLOOD PRESSURE: 126 MMHG

## 2020-03-04 PROCEDURE — 99214 OFFICE O/P EST MOD 30 MIN: CPT

## 2020-03-05 NOTE — PLAN
[FreeTextEntry1] : 54 Patient reports zolpidem 10 is not keeping her asleep. Suggested 12.5 ER as alternative, and patient will let us know if sleep is better with ER formulation.\par Naproxen for HA, if no relief then Sumatriptan.\par Return to clinic if no relief despite medication prescribed.

## 2020-03-05 NOTE — PHYSICAL EXAM
[No Acute Distress] : no acute distress [Well Developed] : well developed [Normal Sclera/Conjunctiva] : normal sclera/conjunctiva [EOMI] : extraocular movements intact [Normal Outer Ear/Nose] : the outer ears and nose were normal in appearance [Supple] : supple [No Respiratory Distress] : no respiratory distress  [No Accessory Muscle Use] : no accessory muscle use [Normal Rate] : normal rate  [Regular Rhythm] : with a regular rhythm [No Extremity Clubbing/Cyanosis] : no extremity clubbing/cyanosis [No Joint Swelling] : no joint swelling [Grossly Normal Strength/Tone] : grossly normal strength/tone [No Rash] : no rash [Coordination Grossly Intact] : coordination grossly intact [No Focal Deficits] : no focal deficits [Normal Gait] : normal gait [Normal] : affect was normal and insight and judgment were intact [de-identified] : well perfused

## 2020-03-05 NOTE — HISTORY OF PRESENT ILLNESS
[FreeTextEntry1] : Patient is here for panic attacks follow up.\par Patient states that her panic attacks have become worse. \par Patient C/O her headaches becoming worse in the past week. \par Patient needs RX refill.  [de-identified] : Ms. DAMION ALVARADO is a 54 year female with pmh as below, presenting to the office today for panic attacks and migraine HA. Patient reporting that her panic attacks are worse, she is using xanax everyday. Patient at this time would not like to try and SSRI or therapy as she already has been through that management route and it did not help her. \par Patient also notes she has not slept through the night for 3 night, making her tired, irritable and worsening panic attacks.

## 2020-03-19 ENCOUNTER — APPOINTMENT (OUTPATIENT)
Dept: FAMILY MEDICINE | Facility: CLINIC | Age: 55
End: 2020-03-19

## 2020-04-02 ENCOUNTER — APPOINTMENT (OUTPATIENT)
Dept: FAMILY MEDICINE | Facility: CLINIC | Age: 55
End: 2020-04-02
Payer: COMMERCIAL

## 2020-04-02 PROCEDURE — 99441: CPT

## 2020-04-02 PROCEDURE — G2012 BRIEF CHECK IN BY MD/QHP: CPT

## 2020-04-02 RX ORDER — AMOXICILLIN 500 MG/1
500 TABLET, FILM COATED ORAL
Qty: 4 | Refills: 3 | Status: DISCONTINUED | COMMUNITY
Start: 2020-02-19 | End: 2020-04-02

## 2020-04-02 RX ORDER — ONDANSETRON 4 MG/1
4 TABLET, ORALLY DISINTEGRATING ORAL EVERY 6 HOURS
Qty: 6 | Refills: 0 | Status: DISCONTINUED | COMMUNITY
Start: 2020-01-16 | End: 2020-04-02

## 2020-04-15 ENCOUNTER — APPOINTMENT (OUTPATIENT)
Dept: FAMILY MEDICINE | Facility: CLINIC | Age: 55
End: 2020-04-15

## 2020-05-02 ENCOUNTER — RX RENEWAL (OUTPATIENT)
Age: 55
End: 2020-05-02

## 2020-05-06 ENCOUNTER — APPOINTMENT (OUTPATIENT)
Dept: FAMILY MEDICINE | Facility: CLINIC | Age: 55
End: 2020-05-06
Payer: COMMERCIAL

## 2020-05-06 PROCEDURE — 99443: CPT

## 2020-05-06 RX ORDER — NAPROXEN 500 MG/1
500 TABLET ORAL TWICE DAILY
Qty: 30 | Refills: 0 | Status: DISCONTINUED | COMMUNITY
Start: 2020-03-04 | End: 2020-05-06

## 2020-05-13 ENCOUNTER — APPOINTMENT (OUTPATIENT)
Dept: FAMILY MEDICINE | Facility: CLINIC | Age: 55
End: 2020-05-13

## 2020-05-21 ENCOUNTER — APPOINTMENT (OUTPATIENT)
Dept: FAMILY MEDICINE | Facility: CLINIC | Age: 55
End: 2020-05-21
Payer: COMMERCIAL

## 2020-05-21 VITALS
HEART RATE: 79 BPM | SYSTOLIC BLOOD PRESSURE: 135 MMHG | WEIGHT: 293 LBS | BODY MASS INDEX: 50.02 KG/M2 | HEIGHT: 64 IN | OXYGEN SATURATION: 99 % | TEMPERATURE: 98.3 F | DIASTOLIC BLOOD PRESSURE: 88 MMHG

## 2020-05-21 DIAGNOSIS — Z87.898 PERSONAL HISTORY OF OTHER SPECIFIED CONDITIONS: ICD-10-CM

## 2020-05-21 PROCEDURE — 99214 OFFICE O/P EST MOD 30 MIN: CPT

## 2020-05-21 NOTE — PHYSICAL EXAM
[Normal Sclera/Conjunctiva] : normal sclera/conjunctiva [Normal Oropharynx] : the oropharynx was normal [No Lymphadenopathy] : no lymphadenopathy [Normal TMs] : both tympanic membranes were normal [No Respiratory Distress] : no respiratory distress  [Clear to Auscultation] : lungs were clear to auscultation bilaterally [Normal Rate] : normal rate  [Regular Rhythm] : with a regular rhythm [Normal S1, S2] : normal S1 and S2 [Normal] : affect was normal and insight and judgment were intact [No Murmur] : no murmur heard [de-identified] : Mild edema of the LEs b/l. [de-identified] : Ambulating with cane

## 2020-05-21 NOTE — PLAN
[FreeTextEntry1] : Forms completed for [employer] eye exam and for temporary disability handicap parking permit.\par F/U 1 month.

## 2020-05-21 NOTE — HISTORY OF PRESENT ILLNESS
[FreeTextEntry1] : Patient presents for eye exam for [employer] license renewal. Patient reports lasik surgery 15 years ago. No visual complaints. does not wear corrective lenses.\par Patient states she has been having some sinus pain and green nasal d/c x 1 wk. Has hx of seasonal allergies. No fever, no cough or sob.\par Denies known Covid-19 contacts.\par c/o her right knee is causing her significant discomfort with walking. Unable to walk distance w/o stopping or feeling unsteady on her feet. Using cane for ambulation.\par

## 2020-05-21 NOTE — REVIEW OF SYSTEMS
[Fever] : no fever [Nasal Discharge] : nasal discharge [Postnasal Drip] : postnasal drip [Joint Pain] : joint pain [Anxiety] : anxiety [Negative] : Respiratory [de-identified] : stable

## 2020-06-04 ENCOUNTER — APPOINTMENT (OUTPATIENT)
Dept: FAMILY MEDICINE | Facility: CLINIC | Age: 55
End: 2020-06-04
Payer: COMMERCIAL

## 2020-06-04 PROCEDURE — 99214 OFFICE O/P EST MOD 30 MIN: CPT | Mod: 95

## 2020-06-04 RX ORDER — CEFDINIR 300 MG/1
300 CAPSULE ORAL
Qty: 20 | Refills: 0 | Status: DISCONTINUED | COMMUNITY
Start: 2020-05-21 | End: 2020-06-04

## 2020-06-04 RX ORDER — ALBUTEROL SULFATE 90 UG/1
108 (90 BASE) AEROSOL, METERED RESPIRATORY (INHALATION)
Qty: 1 | Refills: 0 | Status: DISCONTINUED | COMMUNITY
Start: 2019-12-19 | End: 2020-06-04

## 2020-06-05 NOTE — PHYSICAL EXAM
[de-identified] : Telehealth precludes traditional, comprehensive physical exam. Patient appeared stable and alert.

## 2020-06-11 ENCOUNTER — APPOINTMENT (OUTPATIENT)
Dept: RHEUMATOLOGY | Facility: CLINIC | Age: 55
End: 2020-06-11
Payer: COMMERCIAL

## 2020-06-11 PROCEDURE — 99214 OFFICE O/P EST MOD 30 MIN: CPT | Mod: 95

## 2020-06-11 NOTE — HISTORY OF PRESENT ILLNESS
[Medical Office: ([facility])___] : at the medical office located in  [Home] : at home, [unfilled] , at the time of the visit. [Verbal consent obtained from patient] : the patient, [unfilled] [FreeTextEntry1] : Still w/ right knee pain/swelling, worse since last visit.  She gets some transient relief from diclofenac.   She also says that using a cane is helpful, but she tried wearing a knee brace which made her pain worse.  No pain in the rest of her joints.  No new complaints. [Anorexia] : no anorexia [Weight Loss] : no weight loss [Chills] : no chills [Malaise] : no malaise [Fever] : no fever [Fatigue] : no fatigue [Malar Facial Rash] : no malar facial rash [Skin Lesions] : no lesions [Cough] : no cough [Skin Nodules] : no skin nodules [Oral Ulcers] : no oral ulcers [Dry Mouth] : no dry mouth [Dysphonia] : no dysphonia [Dysphagia] : no dysphagia [Shortness of Breath] : no shortness of breath [Chest Pain] : no chest pain [Arthralgias] : arthralgias [Joint Swelling] : no joint swelling [Joint Warmth] : no joint warmth [Decreased ROM] : no decreased range of motion [Joint Deformity] : no joint deformity [Falls] : no falls [Morning Stiffness] : no morning stiffness

## 2020-06-11 NOTE — ASSESSMENT
[FreeTextEntry1] : 54 year old female presents with severe right knee pain.  x-rays revealed severe OA, though will r/o other concurrent causes of pain, given swelling and lack of significant improvement w/ exercise or NSAID's.  Pt also w/ hx of psoriasis though presentation not c/w PsA.\par   - MRI right knee.\par   - Reiterated importance of exercise\par   - Cont diclofenac 75mg BID prn.\par   - wt loss\par   - warm compresses\par   - OTC topical analgesics\par   - cane

## 2020-06-11 NOTE — PHYSICAL EXAM
[General Appearance - Alert] : alert [General Appearance - In No Acute Distress] : in no acute distress [Sclera] : the sclera and conjunctiva were normal [FreeTextEntry1] : no visible joint swelling;  right knee w/ pain upon flexion/extension;  normal ROM in the rest of her joints [Skin Turgor] : normal skin turgor [Skin Color & Pigmentation] : normal skin color and pigmentation [] : no rash [Oriented To Time, Place, And Person] : oriented to person, place, and time [Impaired Insight] : insight and judgment were intact [Affect] : the affect was normal

## 2020-07-01 ENCOUNTER — APPOINTMENT (OUTPATIENT)
Dept: FAMILY MEDICINE | Facility: CLINIC | Age: 55
End: 2020-07-01
Payer: COMMERCIAL

## 2020-07-01 PROCEDURE — 99213 OFFICE O/P EST LOW 20 MIN: CPT | Mod: 95

## 2020-07-01 NOTE — END OF VISIT
[FreeTextEntry3] : Start Time: 11:35 am\par End Time: 11:50 am\par Non-face-to-face time includes chart review prior to initiation of visit, and post visit processing of orders, and prescriptions.\par \par

## 2020-07-01 NOTE — PHYSICAL EXAM
[No Respiratory Distress] : no respiratory distress  [No Acute Distress] : no acute distress [Normal Affect] : the affect was normal [Normal Mood] : the mood was normal

## 2020-07-01 NOTE — HISTORY OF PRESENT ILLNESS
[Home] : at home, [unfilled] , at the time of the visit. [Medical Office: ([facility])___] : at the medical office located in  [Verbal consent obtained from patient] : the patient, [unfilled] [FreeTextEntry1] : Pt following up on her anxiety and her chronic low back pain.\par Anxiety has been increased since the coronavirus outbreak.  Patient has increased worry regarding exposure through work and casual public contacts.  She is getting adequate control of her anxiety through alprazolam.\par Her low back pain has been increased lately due to recent lifting.  The muscle relaxant has been helping.  Pain is nonradiating.\par

## 2020-07-09 ENCOUNTER — APPOINTMENT (OUTPATIENT)
Dept: RHEUMATOLOGY | Facility: CLINIC | Age: 55
End: 2020-07-09
Payer: COMMERCIAL

## 2020-07-09 VITALS
DIASTOLIC BLOOD PRESSURE: 86 MMHG | RESPIRATION RATE: 17 BRPM | SYSTOLIC BLOOD PRESSURE: 126 MMHG | HEIGHT: 64 IN | TEMPERATURE: 99.1 F | OXYGEN SATURATION: 97 % | HEART RATE: 94 BPM

## 2020-07-09 PROCEDURE — 20610 DRAIN/INJ JOINT/BURSA W/O US: CPT | Mod: RT

## 2020-07-09 PROCEDURE — 99214 OFFICE O/P EST MOD 30 MIN: CPT | Mod: 25

## 2020-07-09 RX ORDER — SUMATRIPTAN 50 MG/1
50 TABLET, FILM COATED ORAL
Qty: 20 | Refills: 0 | Status: DISCONTINUED | COMMUNITY
Start: 2020-03-04 | End: 2020-07-09

## 2020-07-09 RX ORDER — FEXOFENADINE HYDROCHLORIDE 180 MG/1
180 TABLET ORAL DAILY
Qty: 30 | Refills: 0 | Status: DISCONTINUED | COMMUNITY
Start: 2020-01-16 | End: 2020-07-09

## 2020-07-09 RX ORDER — METHYLPRED ACET/NACL,ISO-OS/PF 40 MG/ML
40 VIAL (ML) INJECTION
Qty: 1 | Refills: 0 | Status: COMPLETED | OUTPATIENT
Start: 2020-07-09

## 2020-07-09 RX ORDER — BENZONATATE 200 MG/1
200 CAPSULE ORAL 3 TIMES DAILY
Qty: 15 | Refills: 0 | Status: DISCONTINUED | COMMUNITY
Start: 2019-12-09 | End: 2020-07-09

## 2020-07-09 RX ADMIN — METHYLPREDNISOLONE ACETATE MG/ML: 40 INJECTION, SUSPENSION INTRA-ARTICULAR; INTRALESIONAL; INTRAMUSCULAR; SOFT TISSUE at 00:00

## 2020-07-09 NOTE — ASSESSMENT
[FreeTextEntry1] : 54 year old female presents with severe right knee pain.  x-rays revealed severe OA, though will r/o other concurrent causes of pain, given swelling and lack of significant improvement w/ exercise or NSAID's.  Pt also w/ hx of psoriasis though presentation not c/w PsA.\par   - Awaiting MRI right knee.\par   - Injected Depo Medrol 40mg to right knee.\par   - Reiterated importance of exercise\par   - Cont diclofenac 75mg BID prn.\par   - wt loss\par   - warm compresses\par   - OTC topical analgesics\par   - cane

## 2020-07-09 NOTE — HISTORY OF PRESENT ILLNESS
[Arthralgias] : arthralgias [FreeTextEntry1] : Continues to experience right knee pain, unchanged.  She reports that about 2 weeks ago, her knee buckled again and she fell while on the stairs.  No new complaints. [Anorexia] : no anorexia [Fever] : no fever [Malaise] : no malaise [Weight Loss] : no weight loss [Fatigue] : no fatigue [Malar Facial Rash] : no malar facial rash [Chills] : no chills [Skin Nodules] : no skin nodules [Skin Lesions] : no lesions [Oral Ulcers] : no oral ulcers [Dry Mouth] : no dry mouth [Cough] : no cough [Dysphonia] : no dysphonia [Dysphagia] : no dysphagia [Shortness of Breath] : no shortness of breath [Chest Pain] : no chest pain [Joint Swelling] : no joint swelling [Joint Warmth] : no joint warmth [Joint Deformity] : no joint deformity [Decreased ROM] : no decreased range of motion [Morning Stiffness] : no morning stiffness [Falls] : no falls

## 2020-07-09 NOTE — PROCEDURE
[Arthrocentesis] : arthrocentesis was performed [Today's Date:] : Date: [unfilled] [Patient] : the patient [Benefits] : benefits [Risks] : risks [Alternatives] : alternatives [Consent Obtained] : written consent was obtained prior to the procedure and is detailed in the patient's record [Therapeutic] : therapeutic [#1 Site: ______] : #1 site identified in the [unfilled] [Ethyl Chloride] : ethyl chloride [___ ml Inj] : [unfilled] ~Uml [1%] : 1%  [Chlorhexidine] : chlorhexidine [22 gauge 1.5 inch] : A 22 gauge 1.5 inch needle was used [Without Epi] : without epinephrine [Depomedrol ___ mg] : Depomedrol [unfilled] mg [___ml of fluid] : [unfilled] ml of fluid was aspirated [Tolerated Well] : the patient tolerated the procedure well [Reports Improvement in Symptoms] : reports improvement in symptoms [No Complications] : there were no complications [Instructions Given] : handouts/patient instructions were given to patient [Patient Instructed to Call] : patient was instructed to call if redness at site, a decrease in range of motion or an increase in pain is noted after procedure.

## 2020-07-09 NOTE — PHYSICAL EXAM
[General Appearance - Alert] : alert [General Appearance - In No Acute Distress] : in no acute distress [Sclera] : the sclera and conjunctiva were normal [Skin Color & Pigmentation] : normal skin color and pigmentation [Oriented To Time, Place, And Person] : oriented to person, place, and time [Skin Turgor] : normal skin turgor [Impaired Insight] : insight and judgment were intact [Affect] : the affect was normal [Outer Ear] : the ears and nose were normal in appearance [Neck Cervical Mass (___cm)] : no neck mass was observed [Neck Appearance] : the appearance of the neck was normal [Oropharynx] : the oropharynx was normal [Jugular Venous Distention Increased] : there was no jugular-venous distention [Thyroid Nodule] : there were no palpable thyroid nodules [Thyroid Diffuse Enlargement] : the thyroid was not enlarged [Heart Rate And Rhythm] : heart rate was normal and rhythm regular [Auscultation Breath Sounds / Voice Sounds] : lungs were clear to auscultation bilaterally [Murmurs] : no murmurs [Heart Sounds Gallop] : no gallops [Heart Sounds] : normal S1 and S2 [Heart Sounds Pericardial Friction Rub] : no pericardial rub [Edema] : there was no peripheral edema [Abdomen Soft] : soft [Bowel Sounds] : normal bowel sounds [Abdomen Tenderness] : non-tender [] : no hepato-splenomegaly [Abdomen Mass (___ Cm)] : no abdominal mass palpated [Cervical Lymph Nodes Enlarged Posterior Bilaterally] : posterior cervical [Cervical Lymph Nodes Enlarged Anterior Bilaterally] : anterior cervical [Supraclavicular Lymph Nodes Enlarged Bilaterally] : supraclavicular [No Focal Deficits] : no focal deficits [No Spinal Tenderness] : no spinal tenderness [FreeTextEntry1] : no visible joint swelling;  right knee w/ pain upon flexion/extension;  normal ROM in the rest of her joints

## 2020-07-16 ENCOUNTER — RX RENEWAL (OUTPATIENT)
Age: 55
End: 2020-07-16

## 2020-07-17 ENCOUNTER — RX RENEWAL (OUTPATIENT)
Age: 55
End: 2020-07-17

## 2020-08-03 ENCOUNTER — APPOINTMENT (OUTPATIENT)
Dept: FAMILY MEDICINE | Facility: CLINIC | Age: 55
End: 2020-08-03
Payer: COMMERCIAL

## 2020-08-03 VITALS — SYSTOLIC BLOOD PRESSURE: 110 MMHG | DIASTOLIC BLOOD PRESSURE: 60 MMHG

## 2020-08-03 VITALS
TEMPERATURE: 98 F | WEIGHT: 293 LBS | OXYGEN SATURATION: 100 % | HEART RATE: 88 BPM | BODY MASS INDEX: 50.02 KG/M2 | HEIGHT: 64 IN

## 2020-08-03 DIAGNOSIS — Z87.09 PERSONAL HISTORY OF OTHER DISEASES OF THE RESPIRATORY SYSTEM: ICD-10-CM

## 2020-08-03 DIAGNOSIS — B96.89 PERSONAL HISTORY OF OTHER DISEASES OF THE RESPIRATORY SYSTEM: ICD-10-CM

## 2020-08-03 PROCEDURE — 99214 OFFICE O/P EST MOD 30 MIN: CPT

## 2020-08-03 RX ORDER — PRUCALOPRIDE 2 MG/1
2 TABLET, FILM COATED ORAL
Qty: 30 | Refills: 0 | Status: DISCONTINUED | COMMUNITY
Start: 2020-03-26

## 2020-08-03 RX ORDER — DICLOFENAC SODIUM 75 MG/1
75 TABLET, DELAYED RELEASE ORAL TWICE DAILY
Qty: 60 | Refills: 2 | Status: DISCONTINUED | COMMUNITY
Start: 2020-01-30 | End: 2020-08-03

## 2020-08-03 RX ORDER — HYDROCODONE BITARTRATE AND ACETAMINOPHEN 5; 325 MG/1; MG/1
5-325 TABLET ORAL
Qty: 12 | Refills: 0 | Status: DISCONTINUED | COMMUNITY
Start: 2020-06-23

## 2020-08-03 NOTE — PHYSICAL EXAM
[No Acute Distress] : no acute distress [Normal Sclera/Conjunctiva] : normal sclera/conjunctiva [Clear to Auscultation] : lungs were clear to auscultation bilaterally [No Respiratory Distress] : no respiratory distress  [No Focal Deficits] : no focal deficits [Normal] : affect was normal and insight and judgment were intact [de-identified] : There is diffuse pain of the right knee, most prominent at the medial aspect.  There is generalized swelling of the right knee.  Full range of motion.  No crepitus.  Negative varus/valgus stress.  Negative anterior posterior draw [de-identified] : Frustrated with right knee pain and recent injury

## 2020-08-03 NOTE — HISTORY OF PRESENT ILLNESS
[FreeTextEntry1] : Patient complains of right knee pain x1 week.\par Patient reports falling off of her Jet[employer]ki and twisting her right knee.  She is having pain with weightbearing.  She is due for an MRI of her right knee, which was ordered by her rheumatologist 3 weeks ago.  She has a history of osteoarthritis of the right knee.\par Patient also presents in office today to follow up on chronic insomnia and generalized anxiety disorder.  Patient still with prominent mid and anxiety and occasional panic attacks.  She is getting good results with alprazolam as needed.\ruben Is also here to follow-up on recent blood work.

## 2020-08-03 NOTE — REVIEW OF SYSTEMS
[Joint Pain] : joint pain [Insomnia] : insomnia [Anxiety] : anxiety [Depression] : no depression [Negative] : Neurological

## 2020-08-03 NOTE — PLAN
[FreeTextEntry1] : Awaiting MRI results.  No physical therapy until MRI is reviewed.\par Meloxicam and Tylenol as needed prn pain.  D/C diclofenac due to fluid retention/edema.\par F/u after MRI.

## 2020-08-03 NOTE — DATA REVIEWED
[FreeTextEntry1] : Labs 7/28/2020: CMP WNL, total cholesterol 243, HDL 68, , triglycerides 129, TSH 1.99, hemoglobin A1c 5.4, CBC with differential WNL, vitamin B12 1084, vitamin D 25 OH 13.2.

## 2020-09-02 ENCOUNTER — APPOINTMENT (OUTPATIENT)
Dept: FAMILY MEDICINE | Facility: CLINIC | Age: 55
End: 2020-09-02
Payer: COMMERCIAL

## 2020-09-02 ENCOUNTER — LABORATORY RESULT (OUTPATIENT)
Age: 55
End: 2020-09-02

## 2020-09-02 VITALS
HEART RATE: 74 BPM | TEMPERATURE: 97.3 F | HEIGHT: 64 IN | BODY MASS INDEX: 50.02 KG/M2 | DIASTOLIC BLOOD PRESSURE: 100 MMHG | OXYGEN SATURATION: 98 % | SYSTOLIC BLOOD PRESSURE: 130 MMHG | WEIGHT: 293 LBS

## 2020-09-02 PROCEDURE — 36415 COLL VENOUS BLD VENIPUNCTURE: CPT

## 2020-09-02 PROCEDURE — 99214 OFFICE O/P EST MOD 30 MIN: CPT | Mod: 25

## 2020-09-02 NOTE — REVIEW OF SYSTEMS
[Joint Pain] : joint pain [Back Pain] : back pain [Insomnia] : insomnia [Anxiety] : anxiety [Negative] : Neurological [Depression] : no depression

## 2020-09-02 NOTE — PHYSICAL EXAM
[No Acute Distress] : no acute distress [Normal Sclera/Conjunctiva] : normal sclera/conjunctiva [No Respiratory Distress] : no respiratory distress  [Clear to Auscultation] : lungs were clear to auscultation bilaterally [No Rash] : no rash [No Focal Deficits] : no focal deficits [Normal] : affect was normal and insight and judgment were intact [de-identified] : Lymphedema LEs b/l [de-identified] : There is diffuse pain of the right knee, most prominent at the medial aspect.  There is generalized swelling of the right knee.  Full range of motion.  No crepitus.  Negative varus/valgus stress.  Negative anterior posterior draw [de-identified] : Frustrated with right knee pain and recent injury

## 2020-09-02 NOTE — HISTORY OF PRESENT ILLNESS
[FreeTextEntry1] : She is following up on her right knee pain worse x 1 month.\par Patient is experiencing continuous right knee pain, worse with stairs.  Patient has underlying arthritis of the knee but pain significantly increased 1 month ago after twisting her right knee during a waterskiing accident.  She had an MRI of the right knee which showed a torn medial meniscus, as well as tricompartmental osteoarthritis, severe and mild to moderate joint effusion as well as moderate to large popliteal cyst.\par Pt also here to follow up on anxiety.\par Anxiety has been worse since her right knee injury which is impacting her ability to exercise and do stairs or prolonged walking.\par She has chronic low back pain which has been stable.  Muscle relaxants have been helpful. [de-identified] : Patient has contact with someone that had COVID-19.  She also had an upper respiratory infection several months ago.  She would like to be tested for COVID-19.

## 2020-09-02 NOTE — COUNSELING
[Benefits of weight loss discussed] : Benefits of weight loss discussed [FreeTextEntry2] : More aggressive efforts toward adherence to dietary changes to enhance wt loss discussed.

## 2020-09-02 NOTE — PLAN
[FreeTextEntry1] : F/U with orthopedist to address medial meniscal tear in setting of severe tricompartmental OA.\par Labs drawn in office.\par

## 2020-09-03 ENCOUNTER — TRANSCRIPTION ENCOUNTER (OUTPATIENT)
Age: 55
End: 2020-09-03

## 2020-09-03 ENCOUNTER — APPOINTMENT (OUTPATIENT)
Dept: ORTHOPEDIC SURGERY | Facility: CLINIC | Age: 55
End: 2020-09-03
Payer: COMMERCIAL

## 2020-09-03 VITALS
HEIGHT: 64 IN | BODY MASS INDEX: 50.02 KG/M2 | WEIGHT: 293 LBS | SYSTOLIC BLOOD PRESSURE: 146 MMHG | DIASTOLIC BLOOD PRESSURE: 99 MMHG | HEART RATE: 90 BPM

## 2020-09-03 VITALS — TEMPERATURE: 97.2 F

## 2020-09-03 LAB
SARS-COV-2 IGG SERPL IA-ACNC: <0.1 INDEX
SARS-COV-2 IGG SERPL QL IA: NEGATIVE

## 2020-09-03 PROCEDURE — 20610 DRAIN/INJ JOINT/BURSA W/O US: CPT | Mod: RT

## 2020-09-03 PROCEDURE — 99203 OFFICE O/P NEW LOW 30 MIN: CPT | Mod: 25

## 2020-09-04 RX ORDER — MELOXICAM 15 MG/1
15 TABLET ORAL DAILY
Qty: 30 | Refills: 1 | Status: DISCONTINUED | COMMUNITY
Start: 2020-08-03 | End: 2020-09-04

## 2020-09-14 LAB
AMPHET UR-MCNC: NEGATIVE
BARBITURATES UR-MCNC: NEGATIVE
BENZODIAZ UR-MCNC: NORMAL
COCAINE METAB.OTHER UR-MCNC: NEGATIVE
CREATININE, URINE: 129.7 MG/DL
METHADONE UR-MCNC: NEGATIVE
METHAQUALONE UR-MCNC: NEGATIVE
OPIATES UR-MCNC: NEGATIVE
PCP UR-MCNC: NEGATIVE
PROPOXYPH UR QL: NEGATIVE
THC UR QL: NEGATIVE

## 2020-10-01 ENCOUNTER — APPOINTMENT (OUTPATIENT)
Dept: FAMILY MEDICINE | Facility: CLINIC | Age: 55
End: 2020-10-01
Payer: COMMERCIAL

## 2020-10-01 ENCOUNTER — TRANSCRIPTION ENCOUNTER (OUTPATIENT)
Age: 55
End: 2020-10-01

## 2020-10-01 DIAGNOSIS — L21.9 SEBORRHEIC DERMATITIS, UNSPECIFIED: ICD-10-CM

## 2020-10-01 PROCEDURE — 99213 OFFICE O/P EST LOW 20 MIN: CPT | Mod: 95

## 2020-10-01 NOTE — ASSESSMENT
[FreeTextEntry1] : Patient advised to have nasopharyngeal COVID-19 swab today.\par She is advised to self quarantine due to her cough and possible COVID-19 infection.\par She is to monitor her temperature twice daily.  Patient advised to call the office if she is developing a fever or increasing shortness of breath.  If breathing becomes labored she is advised to go to the emergency room.\par Patient is advised not to return to work until her COVID-19 testing is complete, her cough is resolving, and she is afebrile for 3 days.

## 2020-10-01 NOTE — PHYSICAL EXAM
[No Acute Distress] : no acute distress [No Respiratory Distress] : no respiratory distress  [Dry Cough] : a dry cough was heard [Normal] : affect was normal and insight and judgment were intact [de-identified] : Maculopapular erythematous patch, small at the superior aspect of the right brow.

## 2020-10-01 NOTE — HISTORY OF PRESENT ILLNESS
[Home] : at home, [unfilled] , at the time of the visit. [Medical Office: ([facility])___] : at the medical office located in  [Verbal consent obtained from patient] : the patient, [unfilled] [FreeTextEntry1] : Patient complains of dry cough for 1 week.\par She denies any fever.  Yesterday she did develop some mild shortness of breath.\par No known COVID-19 contacts.\par Patient has history of chronic insomnia relieved with Ambien.\par Patient also has history of panic disorder and has relief from alprazolam as needed.\par Patient complains of slight rash above the right brow itchy x few days.

## 2020-10-01 NOTE — REVIEW OF SYSTEMS
[Postnasal Drip] : postnasal drip [Shortness Of Breath] : shortness of breath [Cough] : cough [Joint Pain] : joint pain [Back Pain] : back pain [Itching] : Itching [Skin Rash] : skin rash [Anxiety] : anxiety [Negative] : Gastrointestinal [Fever] : no fever [Wheezing] : no wheezing [Suicidal] : not suicidal [Depression] : no depression

## 2020-10-05 ENCOUNTER — APPOINTMENT (OUTPATIENT)
Dept: ORTHOPEDIC SURGERY | Facility: CLINIC | Age: 55
End: 2020-10-05

## 2020-10-05 ENCOUNTER — APPOINTMENT (OUTPATIENT)
Dept: FAMILY MEDICINE | Facility: CLINIC | Age: 55
End: 2020-10-05
Payer: COMMERCIAL

## 2020-10-05 PROCEDURE — 99213 OFFICE O/P EST LOW 20 MIN: CPT | Mod: 95

## 2020-10-05 NOTE — PLAN
[FreeTextEntry1] : Patient is advised to continue self quarantine.  She is advised to check her temperature 2 times per day.\par Diet modification recommended due to diarrhea.  Advised incorporation of rice and bananas and increase fluids.\par Antinauseant prescribed.\par Change albuterol HFA to Xopenex HFA due to increased anxiety with albuterol.\par Increase inhaler use to 3 times daily but with Xopenex.\par Pt to contact office for f/u if symptoms worsen or persist.\par

## 2020-10-05 NOTE — REVIEW OF SYSTEMS
[Sore Throat] : no sore throat [Shortness Of Breath] : no shortness of breath [Cough] : cough [Nausea] : nausea [Diarrhea] : diarrhea [Vomiting] : vomiting [Negative] : Psychiatric [FreeTextEntry2] : feverish [FreeTextEntry9] : Chronic knee pain

## 2020-10-05 NOTE — END OF VISIT
[FreeTextEntry3] : Start Time: 12:21 pm\par End Time: 12:37 pm\par Non-face-to-face time includes chart review prior to initiation of visit, and post visit processing of orders, and prescriptions.\par \par

## 2020-10-05 NOTE — HISTORY OF PRESENT ILLNESS
[Home] : at home, [unfilled] , at the time of the visit. [Medical Office: ([facility])___] : at the medical office located in  [Verbal consent obtained from patient] : the patient, [unfilled] [FreeTextEntry8] : Patient complains of body aches, and dry cough x 1-1/2 weeks.\par She is also experiencing nausea and vomiting since this morning.  Patient developed diarrhea 2 days ago described as loose, no blood no mucus.  She denies any shortness of breath.  No known COVID-19 contacts.  Temperature is 99.2 on ibuprofen.  O2 sat at home is 96% -  97% on room air.  She is using her albuterol inhaler twice daily but it is increasing her anxiety and she is avoiding using it.\par She had a Covid-19 PCR test 4 days ago which was negative.

## 2020-10-21 ENCOUNTER — RX RENEWAL (OUTPATIENT)
Age: 55
End: 2020-10-21

## 2020-10-27 ENCOUNTER — APPOINTMENT (OUTPATIENT)
Dept: RHEUMATOLOGY | Facility: CLINIC | Age: 55
End: 2020-10-27
Payer: COMMERCIAL

## 2020-10-27 VITALS
HEIGHT: 64 IN | BODY MASS INDEX: 50.02 KG/M2 | DIASTOLIC BLOOD PRESSURE: 106 MMHG | OXYGEN SATURATION: 97 % | WEIGHT: 293 LBS | RESPIRATION RATE: 17 BRPM | HEART RATE: 79 BPM | TEMPERATURE: 97.4 F | SYSTOLIC BLOOD PRESSURE: 148 MMHG

## 2020-10-27 PROCEDURE — 99072 ADDL SUPL MATRL&STAF TM PHE: CPT

## 2020-10-27 PROCEDURE — 99214 OFFICE O/P EST MOD 30 MIN: CPT | Mod: 25

## 2020-10-27 RX ORDER — ERGOCALCIFEROL 1.25 MG/1
1.25 MG CAPSULE ORAL
Qty: 8 | Refills: 0 | Status: DISCONTINUED | COMMUNITY
Start: 2020-08-03 | End: 2020-10-27

## 2020-10-27 NOTE — PHYSICAL EXAM
[General Appearance - Alert] : alert [General Appearance - In No Acute Distress] : in no acute distress [Sclera] : the sclera and conjunctiva were normal [Outer Ear] : the ears and nose were normal in appearance [Oropharynx] : the oropharynx was normal [Neck Appearance] : the appearance of the neck was normal [Neck Cervical Mass (___cm)] : no neck mass was observed [Jugular Venous Distention Increased] : there was no jugular-venous distention [Thyroid Diffuse Enlargement] : the thyroid was not enlarged [Thyroid Nodule] : there were no palpable thyroid nodules [Auscultation Breath Sounds / Voice Sounds] : lungs were clear to auscultation bilaterally [Heart Rate And Rhythm] : heart rate was normal and rhythm regular [Heart Sounds] : normal S1 and S2 [Heart Sounds Gallop] : no gallops [Murmurs] : no murmurs [Heart Sounds Pericardial Friction Rub] : no pericardial rub [Edema] : there was no peripheral edema [Bowel Sounds] : normal bowel sounds [Abdomen Soft] : soft [Abdomen Tenderness] : non-tender [Abdomen Mass (___ Cm)] : no abdominal mass palpated [Cervical Lymph Nodes Enlarged Posterior Bilaterally] : posterior cervical [Cervical Lymph Nodes Enlarged Anterior Bilaterally] : anterior cervical [Supraclavicular Lymph Nodes Enlarged Bilaterally] : supraclavicular [No Spinal Tenderness] : no spinal tenderness [Skin Color & Pigmentation] : normal skin color and pigmentation [Skin Turgor] : normal skin turgor [] : no rash [No Focal Deficits] : no focal deficits [Oriented To Time, Place, And Person] : oriented to person, place, and time [Impaired Insight] : insight and judgment were intact [Affect] : the affect was normal [FreeTextEntry1] : no visible joint swelling;  right knee w/ pain upon flexion/extension;  normal ROM in the rest of her joints

## 2020-10-27 NOTE — HISTORY OF PRESENT ILLNESS
[Arthralgias] : arthralgias [FreeTextEntry1] : Feeling "the same" since last visit.  Still w/ right knee pain, unchanged.  She saw orthopedics who suggested a TKR, but she declined.  No new complaints. [Anorexia] : no anorexia [Weight Loss] : no weight loss [Malaise] : no malaise [Fever] : no fever [Chills] : no chills [Fatigue] : no fatigue [Malar Facial Rash] : no malar facial rash [Skin Lesions] : no lesions [Skin Nodules] : no skin nodules [Oral Ulcers] : no oral ulcers [Cough] : no cough [Dry Mouth] : no dry mouth [Dysphonia] : no dysphonia [Dysphagia] : no dysphagia [Shortness of Breath] : no shortness of breath [Chest Pain] : no chest pain [Joint Swelling] : no joint swelling [Joint Warmth] : no joint warmth [Joint Deformity] : no joint deformity [Decreased ROM] : no decreased range of motion [Morning Stiffness] : no morning stiffness [Falls] : no falls

## 2020-10-27 NOTE — ASSESSMENT
[FreeTextEntry1] : 54 year old female presents with severe right knee pain.  MRI revealed severe OA and severe medial meniscal tear.  Ortho recommended TKR, but pt declined.  She received a corticostgeroid injection on 9/3/20, which helped initially, but the pt is now recurring.  Pt also w/ hx of psoriasis though presentation not c/w PsA.\par   - Reiterated importance of exercise\par   - Rx nabumetone 750 BID prn.\par   - wt loss\par   - warm compresses\par   - OTC topical analgesics\par   - cane

## 2020-10-27 NOTE — DATA REVIEWED
[FreeTextEntry1] : MRI right knee:  severe OA;  severely degenerated and torn entire medial meniscus;  moderate to large popliteal cyst w/ 2 loose bodies within the popliteal cyst

## 2020-10-29 ENCOUNTER — APPOINTMENT (OUTPATIENT)
Dept: ORTHOPEDIC SURGERY | Facility: CLINIC | Age: 55
End: 2020-10-29
Payer: COMMERCIAL

## 2020-10-29 VITALS — TEMPERATURE: 95.8 F

## 2020-10-29 PROCEDURE — 99072 ADDL SUPL MATRL&STAF TM PHE: CPT

## 2020-10-29 PROCEDURE — 99213 OFFICE O/P EST LOW 20 MIN: CPT

## 2020-11-02 ENCOUNTER — APPOINTMENT (OUTPATIENT)
Dept: FAMILY MEDICINE | Facility: CLINIC | Age: 55
End: 2020-11-02
Payer: COMMERCIAL

## 2020-11-02 ENCOUNTER — NON-APPOINTMENT (OUTPATIENT)
Age: 55
End: 2020-11-02

## 2020-11-02 VITALS
SYSTOLIC BLOOD PRESSURE: 124 MMHG | TEMPERATURE: 97.3 F | HEART RATE: 89 BPM | DIASTOLIC BLOOD PRESSURE: 82 MMHG | HEIGHT: 64 IN | BODY MASS INDEX: 50.02 KG/M2 | WEIGHT: 293 LBS | OXYGEN SATURATION: 97 %

## 2020-11-02 PROCEDURE — 93000 ELECTROCARDIOGRAM COMPLETE: CPT

## 2020-11-02 PROCEDURE — 99072 ADDL SUPL MATRL&STAF TM PHE: CPT

## 2020-11-02 PROCEDURE — 81003 URINALYSIS AUTO W/O SCOPE: CPT | Mod: QW

## 2020-11-02 PROCEDURE — 99214 OFFICE O/P EST MOD 30 MIN: CPT | Mod: 25

## 2020-11-02 NOTE — PHYSICAL EXAM
[No Acute Distress] : no acute distress [No Lymphadenopathy] : no lymphadenopathy [No Respiratory Distress] : no respiratory distress  [Clear to Auscultation] : lungs were clear to auscultation bilaterally [Normal Rate] : normal rate  [Regular Rhythm] : with a regular rhythm [Normal S1, S2] : normal S1 and S2 [No Murmur] : no murmur heard [No Focal Deficits] : no focal deficits [Normal Mood] : the mood was normal [Normal] : affect was normal and insight and judgment were intact [de-identified] : lymphedema LEs b/l

## 2020-11-02 NOTE — REVIEW OF SYSTEMS
[Joint Pain] : joint pain [Muscle Pain] : muscle pain [Headache] : no headache [Dizziness] : dizziness [Anxiety] : anxiety [Depression] : no depression [Negative] : Gastrointestinal [FreeTextEntry5] : Vague sensation in the chest 2 weeks ago.  See HPI

## 2020-11-02 NOTE — HISTORY OF PRESENT ILLNESS
[FreeTextEntry1] : Patient complains of an episode of dizziness X 2 weeks ago.\par Patient had an episode of lightheadedness associated with an unusual sensation in the chest.  She denies any chest pain, palpitations, or shortness of breath.  The episode lasted approximately 4 hours and occurred after a period of emotional upset at work.\par Patient is also here for a follow up on anxiety.  She is using alprazolam for anxiety disorder with panic episodes.  She is having good results with her current medication.\par She has a history of chronic right knee pain due to advanced arthritis with recent acute meniscal tear.  She has been advised to have a total right knee replacement, which she is considering.\par Patient also C/O chronic low back pain and stiffness.  She is using a muscle relaxant with good relief.\par Pt c/o urinary frequency x 2 wks. No dysuria. No abdominal pain.

## 2020-11-02 NOTE — PLAN
[FreeTextEntry1] : Patient with recent episode of dizziness 2 weeks ago, no recurrence.  Exam within normal limits.  Patient advised to follow-up with cardiology for a full cardiac evaluation.\par Urinary frequency of questionable etiology.  Rule out renal compromise.  Patient advised to minimize NSAID use as tolerated.\par Chronic progressive arthritis of the right knee pain with concomitant meniscal tear with possible upcoming total right knee replacement.  Patient is advised to consider consultation with obesity medicine specialist to more aggressively address her weight problem.  This will be especially important with a possible upcoming knee surgery.\par Obesity medicine referral.\par Other recommendations pending lab testing.\par Follow-up 1 month.  Sooner if worse.

## 2020-11-03 LAB
BILIRUB UR QL STRIP: NEGATIVE
GLUCOSE UR-MCNC: NEGATIVE
HCG UR QL: 0.2 EU/DL
HGB UR QL STRIP.AUTO: NEGATIVE
KETONES UR-MCNC: NEGATIVE
LEUKOCYTE ESTERASE UR QL STRIP: NEGATIVE
NITRITE UR QL STRIP: NEGATIVE
PH UR STRIP: 5.5
PROT UR STRIP-MCNC: NEGATIVE
SP GR UR STRIP: 1.03

## 2020-11-05 LAB
APPEARANCE: CLEAR
BACTERIA UR CULT: NORMAL
BACTERIA: NEGATIVE
BILIRUBIN URINE: NEGATIVE
BLOOD URINE: NEGATIVE
COLOR: NORMAL
GLUCOSE QUALITATIVE U: NEGATIVE
HYALINE CASTS: 1 /LPF
KETONES URINE: NEGATIVE
LEUKOCYTE ESTERASE URINE: NEGATIVE
MICROSCOPIC-UA: NORMAL
NITRITE URINE: NEGATIVE
PH URINE: 6
PROTEIN URINE: NEGATIVE
RED BLOOD CELLS URINE: 2 /HPF
SPECIFIC GRAVITY URINE: 1.02
SQUAMOUS EPITHELIAL CELLS: 6 /HPF
UROBILINOGEN URINE: NORMAL
WHITE BLOOD CELLS URINE: 2 /HPF

## 2020-12-04 ENCOUNTER — APPOINTMENT (OUTPATIENT)
Dept: FAMILY MEDICINE | Facility: CLINIC | Age: 55
End: 2020-12-04
Payer: COMMERCIAL

## 2020-12-04 PROCEDURE — 99214 OFFICE O/P EST MOD 30 MIN: CPT | Mod: 95

## 2020-12-04 PROCEDURE — ZZZZZ: CPT

## 2020-12-04 NOTE — PLAN
[FreeTextEntry1] : Pt is advised to self quarantine for 10 days from onset of symptoms, and isolate from family members. Pt is aware that she must be fever free for 24 hours and have improving symptoms for 72 hours before terminating quarantine.\par Patient is advised to check her temperature 2 times per day.  She is also advised to check her oxygen with a pulse ox.  She is to seek immediate medical attention if her symptoms worsen. \par Recommend increasing fluids, Tylenol as needed for body aches or fever, and Zofran as needed nausea.  Patient has Zofran tablets at home from prior prescription.\par Further recommendations pending testing.\par

## 2020-12-04 NOTE — HISTORY OF PRESENT ILLNESS
[Home] : at home, [unfilled] , at the time of the visit. [Medical Office: ([facility])___] : at the medical office located in  [Verbal consent obtained from patient] : the patient, [unfilled] [FreeTextEntry1] : Patient complains of diarrhea x 2 days associated with fatigue and malaise.\par Patient had vomiting yesterday.  She has not eaten any food for 2 days secondary to nausea.  She is experiencing watery discharge from the eyes, yellow discharge from her right nostril.  She complains of a dry cough that feels like a tickle in the throat.  +Sore throat.  Patient denies any fever or shortness of breath.  She denies any COVID-19 contacts that she knows of.\par Patient also with chronic generalized anxiety disorder with panic.  She has adequate relief with alprazolam as needed.

## 2020-12-04 NOTE — REVIEW OF SYSTEMS
[Shortness Of Breath] : no shortness of breath [Cough] : cough [Nausea] : nausea [Diarrhea] : diarrhea [Vomiting] : vomiting [Negative] : Psychiatric

## 2020-12-04 NOTE — PHYSICAL EXAM
[No Acute Distress] : no acute distress [No Respiratory Distress] : no respiratory distress  [Normal] : affect was normal and insight and judgment were intact [de-identified] : mild watery d/c

## 2020-12-07 ENCOUNTER — APPOINTMENT (OUTPATIENT)
Dept: FAMILY MEDICINE | Facility: CLINIC | Age: 55
End: 2020-12-07
Payer: COMMERCIAL

## 2020-12-07 DIAGNOSIS — Z20.828 CONTACT WITH AND (SUSPECTED) EXPOSURE TO OTHER VIRAL COMMUNICABLE DISEASES: ICD-10-CM

## 2020-12-07 LAB
RAPID RVP RESULT: NOT DETECTED
SARS-COV-2 RNA PNL RESP NAA+PROBE: NOT DETECTED

## 2020-12-07 PROCEDURE — 99213 OFFICE O/P EST LOW 20 MIN: CPT | Mod: 95

## 2020-12-08 PROBLEM — Z20.828 SUSPECTED 2019 NOVEL CORONAVIRUS INFECTION: Status: RESOLVED | Noted: 2020-10-01 | Resolved: 2020-12-08

## 2020-12-08 PROBLEM — Z20.828 SUSPECTED COVID-19 VIRUS INFECTION: Status: RESOLVED | Noted: 2020-12-04 | Resolved: 2020-12-08

## 2020-12-08 NOTE — REVIEW OF SYSTEMS
[Fatigue] : fatigue [Discharge] : discharge [Nasal Discharge] : nasal discharge [Postnasal Drip] : postnasal drip [Joint Pain] : joint pain [Negative] : Psychiatric [Fever] : no fever [Shortness Of Breath] : no shortness of breath [Cough] : no cough [Skin Rash] : no skin rash

## 2020-12-08 NOTE — HISTORY OF PRESENT ILLNESS
[Home] : at home, [unfilled] , at the time of the visit. [Medical Office: ([facility])___] : at the medical office located in  [Verbal consent obtained from patient] : the patient, [unfilled] [FreeTextEntry1] : Patient c/o sore throat and nasal d/c  x 5 days.\par Has yellow d/c from right nostril and right facial pressure.\par Patient also with tearing of right eye.\par Pt continues with 1-2 episodes of vomiting/d x past 4 days.\par No fever or cough. Diarrhea has resolved.\par Covid-19 and Flu PCR negative.\par c/o increased knee pain R>L. To see orthopedist next week.\par  [de-identified] : 12/4/20:\par Patient complains of diarrhea x 2 days associated with fatigue and malaise.\par Patient had vomiting yesterday.  She has not eaten any food for 2 days secondary to nausea.  She is experiencing watery discharge from the eyes, yellow discharge from her right nostril.  She complains of a dry cough that feels like a tickle in the throat.  +Sore throat.  Patient denies any fever or shortness of breath.  She denies any COVID-19 contacts that she knows of.\par Patient also with chronic generalized anxiety disorder with panic.  She has adequate relief with alprazolam as needed.

## 2020-12-08 NOTE — PHYSICAL EXAM
[No Acute Distress] : no acute distress [Looks Tired] : appears tired [Normal Sclera/Conjunctiva] : normal sclera/conjunctiva [No Respiratory Distress] : no respiratory distress  [Normal] : affect was normal and insight and judgment were intact

## 2020-12-14 ENCOUNTER — APPOINTMENT (OUTPATIENT)
Dept: ORTHOPEDIC SURGERY | Facility: CLINIC | Age: 55
End: 2020-12-14
Payer: COMMERCIAL

## 2020-12-14 VITALS — WEIGHT: 293 LBS | HEART RATE: 89 BPM | HEIGHT: 64 IN | BODY MASS INDEX: 50.02 KG/M2

## 2020-12-14 PROCEDURE — 99072 ADDL SUPL MATRL&STAF TM PHE: CPT

## 2020-12-14 PROCEDURE — 99213 OFFICE O/P EST LOW 20 MIN: CPT

## 2020-12-21 ENCOUNTER — RX RENEWAL (OUTPATIENT)
Age: 55
End: 2020-12-21

## 2020-12-28 ENCOUNTER — NON-APPOINTMENT (OUTPATIENT)
Age: 55
End: 2020-12-28

## 2020-12-30 ENCOUNTER — APPOINTMENT (OUTPATIENT)
Dept: FAMILY MEDICINE | Facility: CLINIC | Age: 55
End: 2020-12-30
Payer: COMMERCIAL

## 2020-12-30 PROCEDURE — 99214 OFFICE O/P EST MOD 30 MIN: CPT | Mod: 95

## 2020-12-30 NOTE — REVIEW OF SYSTEMS
[Chest Pain] : no chest pain [Palpitations] : no palpitations [Headache] : headache [Dizziness] : dizziness [Anxiety] : anxiety [Negative] : Heme/Lymph [FreeTextEntry9] : knee pain

## 2020-12-30 NOTE — PLAN
[FreeTextEntry1] : Check electrolytes and LFTs due to natural gas exposure.\par Pt to seek immediate medical attn if new or worsening symptoms develop.

## 2020-12-30 NOTE — HISTORY OF PRESENT ILLNESS
[Home] : at home, [unfilled] , at the time of the visit. 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 States her O2 sat was 91 % prior to utilizing home O2.\par No hx of cp or palpitations surrounding the incident. She did not seek any emergency medical tx at the time of the exposure. \par H/A and decreased concentration linguered for several days.  \par Increased anxiety since the gas exposure event.

## 2020-12-30 NOTE — END OF VISIT
[FreeTextEntry3] : Start Time: 12:55 pm\par End Time: 1:25 pm\par Non-face-to-face time includes chart review prior to initiation of visit, and post visit processing of orders, and prescriptions.\par \par

## 2021-01-04 ENCOUNTER — APPOINTMENT (OUTPATIENT)
Dept: FAMILY MEDICINE | Facility: CLINIC | Age: 56
End: 2021-01-04

## 2021-01-04 DIAGNOSIS — R31.29 OTHER MICROSCOPIC HEMATURIA: ICD-10-CM

## 2021-01-04 LAB
ALBUMIN SERPL ELPH-MCNC: 4.5 G/DL
ALP BLD-CCNC: 121 U/L
ALT SERPL-CCNC: 8 U/L
ANION GAP SERPL CALC-SCNC: 13 MMOL/L
APPEARANCE: CLEAR
AST SERPL-CCNC: 15 U/L
BACTERIA: NEGATIVE
BASOPHILS # BLD AUTO: 0.03 K/UL
BASOPHILS NFR BLD AUTO: 0.6 %
BILIRUB SERPL-MCNC: 0.4 MG/DL
BILIRUBIN URINE: NEGATIVE
BLOOD URINE: NEGATIVE
BUN SERPL-MCNC: 17 MG/DL
CALCIUM SERPL-MCNC: 9 MG/DL
CHLORIDE SERPL-SCNC: 104 MMOL/L
CO2 SERPL-SCNC: 24 MMOL/L
COLOR: YELLOW
CREAT SERPL-MCNC: 0.79 MG/DL
EOSINOPHIL # BLD AUTO: 0.06 K/UL
EOSINOPHIL NFR BLD AUTO: 1.2 %
GLUCOSE QUALITATIVE U: NEGATIVE
GLUCOSE SERPL-MCNC: 96 MG/DL
HCT VFR BLD CALC: 40.7 %
HGB BLD-MCNC: 13.8 G/DL
HYALINE CASTS: 5 /LPF
IMM GRANULOCYTES NFR BLD AUTO: 0.2 %
KETONES URINE: NEGATIVE
LEUKOCYTE ESTERASE URINE: ABNORMAL
LYMPHOCYTES # BLD AUTO: 1.8 K/UL
LYMPHOCYTES NFR BLD AUTO: 35 %
MAN DIFF?: NORMAL
MCHC RBC-ENTMCNC: 31.5 PG
MCHC RBC-ENTMCNC: 33.9 GM/DL
MCV RBC AUTO: 92.9 FL
MICROSCOPIC-UA: NORMAL
MONOCYTES # BLD AUTO: 0.31 K/UL
MONOCYTES NFR BLD AUTO: 6 %
NEUTROPHILS # BLD AUTO: 2.93 K/UL
NEUTROPHILS NFR BLD AUTO: 57 %
NITRITE URINE: NEGATIVE
PH URINE: 6
PLATELET # BLD AUTO: 217 K/UL
POTASSIUM SERPL-SCNC: 4.2 MMOL/L
PROT SERPL-MCNC: 6.7 G/DL
PROTEIN URINE: ABNORMAL
RBC # BLD: 4.38 M/UL
RBC # FLD: 12.5 %
RED BLOOD CELLS URINE: 5 /HPF
SODIUM SERPL-SCNC: 141 MMOL/L
SPECIFIC GRAVITY URINE: 1.03
SQUAMOUS EPITHELIAL CELLS: 10 /HPF
UROBILINOGEN URINE: ABNORMAL
WBC # FLD AUTO: 5.14 K/UL
WHITE BLOOD CELLS URINE: 18 /HPF

## 2021-01-13 ENCOUNTER — APPOINTMENT (OUTPATIENT)
Dept: ORTHOPEDIC SURGERY | Facility: CLINIC | Age: 56
End: 2021-01-13
Payer: COMMERCIAL

## 2021-01-13 ENCOUNTER — APPOINTMENT (OUTPATIENT)
Dept: INTERNAL MEDICINE | Facility: CLINIC | Age: 56
End: 2021-01-13

## 2021-01-13 VITALS
HEART RATE: 86 BPM | WEIGHT: 293 LBS | HEIGHT: 64 IN | DIASTOLIC BLOOD PRESSURE: 90 MMHG | SYSTOLIC BLOOD PRESSURE: 152 MMHG | BODY MASS INDEX: 50.02 KG/M2

## 2021-01-13 PROCEDURE — 73562 X-RAY EXAM OF KNEE 3: CPT | Mod: RT

## 2021-01-13 PROCEDURE — 99072 ADDL SUPL MATRL&STAF TM PHE: CPT

## 2021-01-13 PROCEDURE — 99215 OFFICE O/P EST HI 40 MIN: CPT

## 2021-01-13 NOTE — PHYSICAL EXAM
[LE] : Sensory: Intact in bilateral lower extremities [ALL] : dorsalis pedis, posterior tibial, femoral, popliteal, and radial 2+ and symmetric bilaterally [Normal] : Alert and in no acute distress [Obese] : obese [Cane] : ambulates with cane [Poor Appearance] : well-appearing [de-identified] : GENERAL APPEARANCE: Well nourished and hydrated, pleasant, alert, and oriented x 3. Appears their stated age. \par HEENT: Normocephalic, extraocular eye motion intact. Nasal septum midline. Oral cavity clear. External auditory canal clear. \par RESPIRATORY: Breath sounds clear and audible in all lobes. No wheezing, No accessory muscle use.\par CARDIOVASCULAR: No apparent abnormalities. No lower leg edema. No varicosities. Pedal pulses are palpable.\par NEUROLOGIC: Sensation is normal, no muscle weakness in the upper or lower extremities.\par DERMATOLOGIC: No apparent skin lesions, moist, warm, no rash.\par SPINE: Cervical spine appears normal and moves freely; thoracic spine appears normal and moves freely; lumbosacral spine appears normal and moves freely, normal, nontender.\par MUSCULOSKELETAL: Hands, wrists, and elbows are normal and move freely, shoulders are normal and move freely.  [de-identified] : Right knee exam shows medial joint line tenderness, no instability, no edema, severe obesity [de-identified] : 3V xray of the right knee done in the office today and reviewed by Dr. Teto Jacobs demonstrates bone on bone medial compartmental osteoarthritis

## 2021-01-13 NOTE — END OF VISIT
[FreeTextEntry3] : I, Kolton Dillard, acted solely as a scribe for Dr. Teto Jacobs on this date 01/13/2021.

## 2021-01-13 NOTE — HISTORY OF PRESENT ILLNESS
[Pain Location] : pain [Worsening] : worsening [___ mths] : [unfilled] month(s) ago [6] : a minimum pain level of 6/10 [10] : a maximum pain level of 10/10 [Sitting] : worsened by sitting [Walking] : worsened by walking [Heat] : relieved by heat [Ice] : relieved by ice [Opioid Analgesics] : relieved by opioid analgesics [Rest] : relieved by rest [de-identified] : 56 y/o F presents with right knee pain which started 5 years ago, but notes that it has been worsening since December 2020. She previously had gel injections from Dr. Márquez and cortisone injections from Dr. Marinelli. Her pain is localized and describes the pain as achy and shooting. Rest, ice, heat, and Tramadol have been providing relief. She notes that when she takes ibuprofen, it causes her feet to swell. Pt notes that lying down, sitting, and walking long distances exacerbates her pain.  [de-identified] : lying down

## 2021-01-13 NOTE — DISCUSSION/SUMMARY
[Surgical risks reviewed] : Surgical risks reviewed [de-identified] : TYPE 3: We don't think she is optimized for surgery at this time\par \par \par 54 y/o F with bone on bone medial compartmental osteoarthritis of the right knee. Conservative therapy and surgical options discussed in detail with the patient. The pt is a candidate for a right TKA. She notes that her pain is worsening. We explained to the pt that she is high risk for surgery due to being overweight. She understands that being overweight puts her at a higher risk for infection. We highly encourage the pt to consult a nutritionist and our bariatric team. She understands that she should have an evaluation to determine if she is malnourished. Pt comprehends that being malnourished is another risk factor. She notes that she has an appointment with another bariatric team; however, the appointment is scheduled in three months. We provided a referral for a nutritionist and Dr. Stewart. The pt should have a proper evaluation to determine if she is malnourished and to try to lose weight prior to pursuing surgery. We prescribed another prescription for Tramadol for pain management; however, this will be the last time we will provide her with pain medication. She will f/u in three months to discuss her progress with weight loss and malnutrition. \par \par \par The patient has been counseled regarding the elevated risks associated with surgical complications in patients with a BMI> 35. The patient demonstrates an understanding of the increased risk. After a lengthy discussion, the patient agreed to make a coordinated effort at weight loss prior to surgical intervention. The patient understands our BMI policy and they will make a conscious effort to improve their BMI. \par \par The patient is a 55 year individual with end stage arthritis of their right knee joint. Based upon the patient's continued symptoms and failure to respond to conservative treatment I have recommended a right total knee arthroplasty for this patient. A long discussion took place with the patient describing what a total joint replacement is and what the procedure would entail. A total knee arthroplasty model, similar to the implant that was used during the operation, was utilized to demonstrate and to discuss the various bearing surfaces of the implants. The hospitalization and post-operative care and rehabilitation were also discussed. The use of perioperative antibiotics and DVT prophylaxis were discussed. The risk, benefits and alternatives to a surgical intervention were discussed at length with the patient. The patient was also advised of risks related to the medical comorbidities, elevated body mass index (BMI), and smoking where applicable. We discussed how to reduce modifiable risk factors and encouraged smoking cessation were applicable.. A lengthy discussion took place to review the most common complications including but not limited to: deep vein thrombosis, pulmonary embolus, heart attack, stroke, infection, wound breakdown, numbness, damage to nerves, tendon, muscles, arteries or other blood vessels, death and other possible complications from anesthesia. The patient was told that we will take steps to minimize these risks by using sterile technique, antibiotics and DVT prophylaxis when appropriate and follow the patient postoperatively in the office setting to monitor progress. The possibility of recurrent pain, no improvement in pain and actual worsening of pain were also discussed with the patient.\par The discharge plan of care focused on the patient going home following surgery. The patient was encouraged to make the necessary arrangements to have someone stay with them when they are discharged home. Following discharge, a home care nurse was to the patient. The home care nurse would open the patient’s home care case and request home physical therapy services. Home physical therapy was to commence following discharge provided it was appropriate and covered by the health insurance benefit plan. \par The benefits of surgery were discussed with the patient including the potential for improving her current clinical condition through operative intervention. Alternatives to surgical intervention including continued conservative management were also discussed in detail. All questions were answered to the satisfaction of the patient. The treatment plan of care, as well as a model of a total knee arthroplasty equivalent to the one that will be used for their total joint replacement, was shared with the patient. The patient agreed to the plan of care as well as the use of implants in their total joint replacement.

## 2021-01-13 NOTE — REVIEW OF SYSTEMS
[Joint Pain] : joint pain [Negative] : Endocrine [Joint Stiffness] : joint stiffness [Joint Swelling] : joint swelling [Constipation] : constipation [Diarrhea] : diarrhea [Heartburn] : heartburn [Easy Bruising] : a tendency for easy bruising [FreeTextEntry9] : right knee

## 2021-01-21 ENCOUNTER — APPOINTMENT (OUTPATIENT)
Dept: RHEUMATOLOGY | Facility: CLINIC | Age: 56
End: 2021-01-21
Payer: COMMERCIAL

## 2021-01-21 VITALS
SYSTOLIC BLOOD PRESSURE: 126 MMHG | WEIGHT: 293 LBS | RESPIRATION RATE: 17 BRPM | BODY MASS INDEX: 50.02 KG/M2 | TEMPERATURE: 98 F | DIASTOLIC BLOOD PRESSURE: 84 MMHG | HEIGHT: 64 IN

## 2021-01-21 PROCEDURE — 99214 OFFICE O/P EST MOD 30 MIN: CPT

## 2021-01-21 PROCEDURE — 99072 ADDL SUPL MATRL&STAF TM PHE: CPT

## 2021-01-21 RX ORDER — CELECOXIB 200 MG/1
200 CAPSULE ORAL
Qty: 28 | Refills: 0 | Status: DISCONTINUED | COMMUNITY
Start: 2020-12-30 | End: 2021-01-21

## 2021-01-21 RX ORDER — CEFDINIR 300 MG/1
300 CAPSULE ORAL
Qty: 20 | Refills: 0 | Status: DISCONTINUED | COMMUNITY
Start: 2020-12-07 | End: 2021-01-21

## 2021-01-21 NOTE — HISTORY OF PRESENT ILLNESS
[Arthralgias] : arthralgias [FreeTextEntry1] : Continues to experience severe right knee pain - now occurs even at rest, prevents her from sleeping.  Cannot perform ADL's.  No improvement w/ nabumetone.  Pt saw orthopedics again, and she now agrees to TKR.  Also w/ right ankle pain w/ walking.  No pain in the rest of her joints.  Psoriasis stable w/ topical steroids.  No other complaints. [Anorexia] : no anorexia [Weight Loss] : no weight loss [Malaise] : no malaise [Fever] : no fever [Chills] : no chills [Fatigue] : no fatigue [Malar Facial Rash] : no malar facial rash [Skin Lesions] : no lesions [Skin Nodules] : no skin nodules [Oral Ulcers] : no oral ulcers [Cough] : no cough [Dry Mouth] : no dry mouth [Dysphonia] : no dysphonia [Dysphagia] : no dysphagia [Shortness of Breath] : no shortness of breath [Chest Pain] : no chest pain [Joint Swelling] : no joint swelling [Joint Warmth] : no joint warmth [Joint Deformity] : no joint deformity [Decreased ROM] : no decreased range of motion [Morning Stiffness] : no morning stiffness [Falls] : no falls

## 2021-01-21 NOTE — DATA REVIEWED
[FreeTextEntry1] : x-rays of right knee (per ortho note 1/13/21):  bone on bone medial compartmental osteoarthritis

## 2021-01-21 NOTE — ASSESSMENT
[FreeTextEntry1] : 54 year old female presents with severe right knee pain.  MRI revealed severe OA and severe medial meniscal tear.  Ortho planning TKR - likely in 1-2 months.  She received a corticosteroid injection on 9/3/20, which helped initially, but the pt is now recurring.  Pt also w/ hx of psoriasis though presentation not c/w PsA.  Psoriasis remains well controlled w/ topical steroids.  Pt also c/o right ankle pain, likely due to altered gait from right knee pain.\par   - Ortho f/u for pre-op.\par   - Reiterated importance of exercise\par   - On tramadol by ortho.\par   - wt loss\par   - warm compresses\par   - OTC topical analgesics\par   - cane

## 2021-02-01 ENCOUNTER — APPOINTMENT (OUTPATIENT)
Dept: FAMILY MEDICINE | Facility: CLINIC | Age: 56
End: 2021-02-01

## 2021-02-03 ENCOUNTER — APPOINTMENT (OUTPATIENT)
Dept: FAMILY MEDICINE | Facility: CLINIC | Age: 56
End: 2021-02-03
Payer: COMMERCIAL

## 2021-02-03 DIAGNOSIS — B96.89 PERSONAL HISTORY OF OTHER DISEASES OF THE RESPIRATORY SYSTEM: ICD-10-CM

## 2021-02-03 DIAGNOSIS — Z87.09 PERSONAL HISTORY OF OTHER DISEASES OF THE RESPIRATORY SYSTEM: ICD-10-CM

## 2021-02-03 PROCEDURE — 99214 OFFICE O/P EST MOD 30 MIN: CPT | Mod: 95

## 2021-02-03 NOTE — HISTORY OF PRESENT ILLNESS
[Home] : at home, [unfilled] , at the time of the visit. [Medical Office: ([facility])___] : at the medical office located in  [Verbal consent obtained from patient] : the patient, [unfilled] [FreeTextEntry1] : Pt following up on anxiety and insomnia.\par Pt having right knee replacement 3-3-21.\par She is scheduled for cardiac and medical clearance.\par Patient has been experiencing progressively worsening right knee pain severe osteoarthritis.\par She is having difficulty walking short distances, and especially with stairs due to right knee pain. Since her right knee pain has been worse, she has also been noticing right ankle pain as well.  He had adequate relief with tramadol. Patient also used Naprelan previously as well as ibuprofen in the past with little relief.\par The patient has had consultation with her orthopedist as well as the rheumatologist.\par Patient complains of persistent allergic rhinitis.  She is experiencing throat discomfort and poor tolerance to the fragrance in fluticasone nasal spray.\par Patient is prone to panic attack and has been suffering with longstanding general anxiety disorder.  She overall has good relief with alprazolam.\par \par

## 2021-02-03 NOTE — REVIEW OF SYSTEMS
[Postnasal Drip] : postnasal drip [Joint Pain] : joint pain [Back Pain] : back pain [Anxiety] : anxiety [Negative] : Neurological [Depression] : no depression [FreeTextEntry9] : right ankle pain.

## 2021-02-03 NOTE — PHYSICAL EXAM
[No Acute Distress] : no acute distress [No Lymphadenopathy] : no lymphadenopathy [No Respiratory Distress] : no respiratory distress  [Clear to Auscultation] : lungs were clear to auscultation bilaterally [Normal] : affect was normal and insight and judgment were intact

## 2021-02-03 NOTE — PLAN
[FreeTextEntry1] : Patient is advised to use the Naprelan and Tylenol as needed for right knee pain.\par She may use tramadol for severe pain and breakthrough from the Naprelan Tylenol combination.\par Patient is cautioned not to use the tramadol when she is taking her alprazolam or her zolpidem.\par F/U 1 month.

## 2021-02-03 NOTE — END OF VISIT
[FreeTextEntry3] : Start Time: 12:00 pm\par End Time: 12:25 pm\par Moderate complexity decision making based on pt's multiple chronic medical conditions and polypharmacy.\par

## 2021-02-12 ENCOUNTER — APPOINTMENT (OUTPATIENT)
Dept: FAMILY MEDICINE | Facility: CLINIC | Age: 56
End: 2021-02-12

## 2021-02-17 ENCOUNTER — APPOINTMENT (OUTPATIENT)
Dept: CARDIOLOGY | Facility: CLINIC | Age: 56
End: 2021-02-17
Payer: COMMERCIAL

## 2021-02-17 ENCOUNTER — OUTPATIENT (OUTPATIENT)
Dept: OUTPATIENT SERVICES | Facility: HOSPITAL | Age: 56
LOS: 1 days | End: 2021-02-17
Payer: COMMERCIAL

## 2021-02-17 VITALS
OXYGEN SATURATION: 98 % | SYSTOLIC BLOOD PRESSURE: 133 MMHG | HEIGHT: 64 IN | RESPIRATION RATE: 16 BRPM | BODY MASS INDEX: 50.02 KG/M2 | DIASTOLIC BLOOD PRESSURE: 87 MMHG | HEART RATE: 86 BPM | WEIGHT: 293 LBS | TEMPERATURE: 97.7 F

## 2021-02-17 DIAGNOSIS — Z98.89 OTHER SPECIFIED POSTPROCEDURAL STATES: Chronic | ICD-10-CM

## 2021-02-17 PROCEDURE — 99204 OFFICE O/P NEW MOD 45 MIN: CPT

## 2021-02-17 PROCEDURE — 93306 TTE W/DOPPLER COMPLETE: CPT

## 2021-02-17 PROCEDURE — 93000 ELECTROCARDIOGRAM COMPLETE: CPT

## 2021-02-17 PROCEDURE — 99072 ADDL SUPL MATRL&STAF TM PHE: CPT

## 2021-02-17 PROCEDURE — 93010 ELECTROCARDIOGRAM REPORT: CPT

## 2021-02-17 RX ORDER — ONDANSETRON 4 MG/1
4 TABLET ORAL EVERY 4 HOURS
Qty: 6 | Refills: 0 | Status: DISCONTINUED | COMMUNITY
Start: 2020-12-07 | End: 2021-02-17

## 2021-02-17 RX ORDER — TRAMADOL HYDROCHLORIDE 50 MG/1
50 TABLET, COATED ORAL
Qty: 28 | Refills: 0 | Status: DISCONTINUED | COMMUNITY
Start: 2020-12-14 | End: 2021-02-17

## 2021-02-17 RX ORDER — IBUPROFEN 800 MG/1
800 TABLET, FILM COATED ORAL 3 TIMES DAILY
Qty: 42 | Refills: 0 | Status: DISCONTINUED | COMMUNITY
Start: 2020-10-29 | End: 2021-02-17

## 2021-02-17 RX ORDER — IBUPROFEN 800 MG/1
800 TABLET, FILM COATED ORAL 3 TIMES DAILY
Qty: 42 | Refills: 0 | Status: DISCONTINUED | COMMUNITY
Start: 2020-12-14 | End: 2021-02-17

## 2021-02-17 RX ORDER — IBUPROFEN 800 MG/1
800 TABLET, FILM COATED ORAL 3 TIMES DAILY
Qty: 30 | Refills: 0 | Status: DISCONTINUED | COMMUNITY
Start: 2020-09-04 | End: 2021-02-17

## 2021-02-17 RX ORDER — CARISOPRODOL 350 MG/1
350 TABLET ORAL
Qty: 90 | Refills: 0 | Status: DISCONTINUED | COMMUNITY
Start: 1900-01-01 | End: 2021-02-17

## 2021-02-17 RX ORDER — NABUMETONE 750 MG/1
750 TABLET, FILM COATED ORAL TWICE DAILY
Qty: 60 | Refills: 1 | Status: DISCONTINUED | COMMUNITY
Start: 2020-10-27 | End: 2021-02-17

## 2021-02-17 RX ORDER — LEVALBUTEROL TARTRATE 45 UG/1
45 AEROSOL, METERED ORAL EVERY 4 HOURS
Qty: 1 | Refills: 1 | Status: DISCONTINUED | COMMUNITY
Start: 2020-10-05 | End: 2021-02-17

## 2021-02-17 RX ORDER — ONDANSETRON 4 MG/1
4 TABLET ORAL EVERY 4 HOURS
Qty: 6 | Refills: 0 | Status: DISCONTINUED | COMMUNITY
Start: 2020-10-05 | End: 2021-02-17

## 2021-02-17 RX ORDER — HYDROCORTISONE 25 MG/G
2.5 CREAM TOPICAL TWICE DAILY
Qty: 1 | Refills: 0 | Status: DISCONTINUED | COMMUNITY
Start: 2020-10-01 | End: 2021-02-17

## 2021-02-17 RX ORDER — BISMUTH SUBSALICYLATE 262 MG
400 TABLET ORAL
Refills: 0 | Status: DISCONTINUED | COMMUNITY
End: 2021-02-17

## 2021-02-17 RX ORDER — METHYLPREDNISOLONE ACETATE 40 MG/ML
40 INJECTION, SUSPENSION INTRA-ARTICULAR; INTRALESIONAL; INTRAMUSCULAR; SOFT TISSUE
Refills: 0 | Status: DISCONTINUED | COMMUNITY
Start: 2020-06-23 | End: 2021-02-17

## 2021-02-17 RX ORDER — TRAMADOL HYDROCHLORIDE 50 MG/1
50 TABLET, COATED ORAL
Qty: 9 | Refills: 0 | Status: DISCONTINUED | COMMUNITY
Start: 2021-02-03 | End: 2021-02-17

## 2021-02-17 RX ORDER — TRIAMCINOLONE ACETONIDE 55 UG/1
55 SPRAY, METERED NASAL DAILY
Qty: 1 | Refills: 2 | Status: DISCONTINUED | COMMUNITY
Start: 2021-02-03 | End: 2021-02-17

## 2021-02-22 ENCOUNTER — APPOINTMENT (OUTPATIENT)
Dept: CARDIOLOGY | Facility: CLINIC | Age: 56
End: 2021-02-22
Payer: COMMERCIAL

## 2021-02-22 PROCEDURE — 78452 HT MUSCLE IMAGE SPECT MULT: CPT

## 2021-02-22 PROCEDURE — A9500: CPT

## 2021-02-22 PROCEDURE — 93015 CV STRESS TEST SUPVJ I&R: CPT

## 2021-02-22 PROCEDURE — 99072 ADDL SUPL MATRL&STAF TM PHE: CPT

## 2021-02-22 RX ADMIN — REGADENOSON 0 MG/5ML: 0.08 INJECTION, SOLUTION INTRAVENOUS at 00:00

## 2021-02-25 RX ORDER — REGADENOSON 0.08 MG/ML
0.4 INJECTION, SOLUTION INTRAVENOUS
Qty: 1 | Refills: 0 | Status: COMPLETED | OUTPATIENT
Start: 2021-02-22

## 2021-02-26 ENCOUNTER — APPOINTMENT (OUTPATIENT)
Dept: CARDIOLOGY | Facility: CLINIC | Age: 56
End: 2021-02-26

## 2021-02-28 ENCOUNTER — APPOINTMENT (OUTPATIENT)
Dept: DISASTER EMERGENCY | Facility: CLINIC | Age: 56
End: 2021-02-28

## 2021-03-01 ENCOUNTER — APPOINTMENT (OUTPATIENT)
Dept: FAMILY MEDICINE | Facility: CLINIC | Age: 56
End: 2021-03-01

## 2021-03-01 ENCOUNTER — APPOINTMENT (OUTPATIENT)
Dept: FAMILY MEDICINE | Facility: CLINIC | Age: 56
End: 2021-03-01
Payer: COMMERCIAL

## 2021-03-01 VITALS
HEIGHT: 64 IN | TEMPERATURE: 98 F | BODY MASS INDEX: 50.02 KG/M2 | DIASTOLIC BLOOD PRESSURE: 90 MMHG | SYSTOLIC BLOOD PRESSURE: 140 MMHG | HEART RATE: 87 BPM | WEIGHT: 293 LBS | OXYGEN SATURATION: 97 %

## 2021-03-01 DIAGNOSIS — Z77.29 CONTACT WITH AND (SUSPECTED) EXPOSURE TO OTHER HAZARDOUS SUBSTANCES: ICD-10-CM

## 2021-03-01 DIAGNOSIS — Z86.19 PERSONAL HISTORY OF OTHER INFECTIOUS AND PARASITIC DISEASES: ICD-10-CM

## 2021-03-01 DIAGNOSIS — R21 RASH AND OTHER NONSPECIFIC SKIN ERUPTION: ICD-10-CM

## 2021-03-01 DIAGNOSIS — Z87.09 PERSONAL HISTORY OF OTHER DISEASES OF THE RESPIRATORY SYSTEM: ICD-10-CM

## 2021-03-01 DIAGNOSIS — Z01.00 ENCOUNTER FOR EXAMINATION OF EYES AND VISION W/OUT ABNORMAL FINDINGS: ICD-10-CM

## 2021-03-01 DIAGNOSIS — Z20.822 CONTACT WITH AND (SUSPECTED) EXPOSURE TO COVID-19: ICD-10-CM

## 2021-03-01 DIAGNOSIS — Z51.81 ENCOUNTER FOR THERAPEUTIC DRUG LVL MONITORING: ICD-10-CM

## 2021-03-01 DIAGNOSIS — Z87.39 PERSONAL HISTORY OF OTHER DISEASES OF THE MUSCULOSKELETAL SYSTEM AND CONNECTIVE TISSUE: ICD-10-CM

## 2021-03-01 DIAGNOSIS — R05 COUGH: ICD-10-CM

## 2021-03-01 DIAGNOSIS — M25.572 PAIN IN LEFT ANKLE AND JOINTS OF LEFT FOOT: ICD-10-CM

## 2021-03-01 DIAGNOSIS — R82.998 OTHER ABNORMAL FINDINGS IN URINE: ICD-10-CM

## 2021-03-01 DIAGNOSIS — S83.206A UNSPECIFIED TEAR OF UNSPECIFIED MENISCUS, CURRENT INJURY, RIGHT KNEE, INITIAL ENCOUNTER: ICD-10-CM

## 2021-03-01 DIAGNOSIS — J11.1 INFLUENZA DUE TO UNIDENTIFIED INFLUENZA VIRUS WITH OTHER RESPIRATORY MANIFESTATIONS: ICD-10-CM

## 2021-03-01 DIAGNOSIS — Z87.898 PERSONAL HISTORY OF OTHER SPECIFIED CONDITIONS: ICD-10-CM

## 2021-03-01 DIAGNOSIS — G47.00 INSOMNIA, UNSPECIFIED: ICD-10-CM

## 2021-03-01 DIAGNOSIS — Z86.39 PERSONAL HISTORY OF OTHER ENDOCRINE, NUTRITIONAL AND METABOLIC DISEASE: ICD-10-CM

## 2021-03-01 LAB — SARS-COV-2 N GENE NPH QL NAA+PROBE: NOT DETECTED

## 2021-03-01 PROCEDURE — 99072 ADDL SUPL MATRL&STAF TM PHE: CPT

## 2021-03-01 PROCEDURE — 99215 OFFICE O/P EST HI 40 MIN: CPT

## 2021-03-01 NOTE — ASSESSMENT
[Patient Optimized for Surgery] : Patient optimized for surgery [As per surgery] : as per surgery [FreeTextEntry4] : There is no medical contraindication to the proposed procedure. The patient is medically cleared to proceed with the planned surgery so long as her Covid-19 PCR test is negative.\par Cardiac clearance obtained by Dr. Marvin Sahu.\par She had her Covid-19 PCR on 2/28/21. [FreeTextEntry7] : Sanjana is to take her Omeprazole in the am of her procedure with a small sip of water.

## 2021-03-01 NOTE — REASON FOR VISIT
[FreeTextEntry1] : 55 year old female presenting for medical clearance. Mrs. Martinez is scheduled for a right knee replacement on 3/3/21 with Dr. Atkinson at [facility].\par  L TKR done in 2015 w/o any cardiac events. \par \par Pt states she had elevated blood pressures during a period in her life when she traveled excessively otherwise not real HTN.\par \par Denies HLD although BW from 11/9/2020 revealed a total cholesterol of 229 and a LDL of 141. Currently she is only taking OTC fish oil.\par \par Family hx includes only cancers in both parents. Denies CAD, DM, CVAs or HTN.\par No smoking or diabetes history.

## 2021-03-01 NOTE — ASSESSMENT
[FreeTextEntry1] : ECG-normal sinus rhythm at 68 bpm.  Low voltage QRS in the limb leads.  Nonspecific T wave normalities.\par \par \par Lab data 11/9/20\par Chol. 229\par HDL 71\par \par Tri. 83\par HGB 12.6\par A1C 5.3\par Chol. \par Creat 0.77\par \par Impression\par \par 55 year old obese female presenting for medical clearance scheduled for R TKR on 3/3/21. Denies personal hx of CAD, DM, HTN or HLD. No family history of coronary disease. Former patient of Dr. magallanes/ cardio \par \par -BW from Nov. reveal elevated LDL and total chol. Currently not on any statins.\par -  cardiac symptoms are GAFFNEY\par - ECG low voltage and nonspecific T wave abnormalities. BP WNL.\par - BMI 56, states at her lowest she was 280 lbs. Currently seeking bariatric sx eval. for possible sleeve. \par -Tolerated L TKR in 2015.\par -Post- menopausal for 5 years. \par \par \par Plan\par 1. For risk stratification preop,  with cardiac risk factors of HLD and 5 years post menopausal, morbid obesity and limited ambulatory ability, she will need to be scheduled for an echocardiogram and pharmacological stress test. Clearance will be determined based on the results of the aforementioned. \par \par 2.  Instructed the patient about the benefits of a diet that restricts portion sizes, increases frequency of meals and consists of  vegetables, (more green and leafy),fruit and nuts, whole grains, lean proteins and limits carbohydrates and meat and dairy fats instructed the patient about the benefits of a diet that restricts portion sizes, increases frequency of meals and consists of  vegetables, (more green and leafy),fruit and nuts, whole grains, lean proteins and limits carbohydrates and meat and dairy fats\par \par 3. BW through PCP\par \par \par Final clearance release and clinical follow up in after testing \par \par

## 2021-03-01 NOTE — HISTORY OF PRESENT ILLNESS
[FreeTextEntry1] : Prior to her knee problem she was able to reach a weight of 280 and was participating in cardio kick boxing 7 days a week with out any exertional discomfort. Currently she needs to ambulate with the assistance of a cane and she becomes readily fatigued and short of breath with minimal activity.. 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 States she recently completed a HST but the details are not available yet. This was ordered by a bariatric surgeon who she is seeking consultation in regard to a gastric sleeve .\par \par Not having any chest pain, palps or orthopnea.  But overall activity level is, however are quite limited

## 2021-03-01 NOTE — RESULTS/DATA
[] : results reviewed [de-identified] : WNL [de-identified] : WNL [de-identified] : RUBEN WNL [de-identified] : NSR 72 bpm. WNL [de-identified] : U/A: neg-trace protein, otherwise WNL.\par HBA1C 5.2\par Type and Screen: \par ABO:    AB\par RH:   Positive\par AB Screen gel:   Negative\par \par Nasal Swab:\par MSSA:   Negative\par MRSA by PCR:  Negative.

## 2021-03-01 NOTE — HISTORY OF PRESENT ILLNESS
[FreeTextEntry1] : Total Right Knee Replacement [FreeTextEntry2] : 3/3/2021 [FreeTextEntry3] : Dr. Atkinson [FreeTextEntry4] : Patient is a 55-year-old female with a past medical history of morbid obesity, hypertension, chronic low back pain, chronic anxiety with panic disorder, generalized osteoarthritis status post left knee replacement and right medial meniscal tear presents for preoperative clearance for total right knee replacement. She feels well. She did not take her Dyazide this am. [FreeTextEntry7] : Echo 2/17/21: EF 60-65%. Normal LV function. Aortic sclerosis\par Nuclear Stress Test, Pharmacologic: Normal LV systolic function. No significant valvular heart disease and no inducible ischemia.

## 2021-03-01 NOTE — PHYSICAL EXAM
[No Acute Distress] : no acute distress [Normal Sclera/Conjunctiva] : normal sclera/conjunctiva [PERRL] : pupils equal round and reactive to light [EOMI] : extraocular movements intact [Normal Oropharynx] : the oropharynx was normal [No Lymphadenopathy] : no lymphadenopathy [Supple] : supple [No Respiratory Distress] : no respiratory distress  [Clear to Auscultation] : lungs were clear to auscultation bilaterally [Normal Rate] : normal rate  [Regular Rhythm] : with a regular rhythm [Normal S1, S2] : normal S1 and S2 [No Murmur] : no murmur heard [Soft] : abdomen soft [Non Tender] : non-tender [Non-distended] : non-distended [No Masses] : no abdominal mass palpated [No HSM] : no HSM [Normal Bowel Sounds] : normal bowel sounds [No Rash] : no rash [No Focal Deficits] : no focal deficits [Normal] : affect was normal and insight and judgment were intact [de-identified] : lymphedema b/l LEs [de-identified] : Right knee tenderness

## 2021-03-01 NOTE — PHYSICAL EXAM
[Normal Conjunctiva] : the conjunctiva exhibited no abnormalities [Normal Oral Mucosa] : normal oral mucosa [No Oral Pallor] : no oral pallor [No Oral Cyanosis] : no oral cyanosis [Normal Oropharynx] : normal oropharynx [Normal Jugular Venous A Waves Present] : normal jugular venous A waves present [Normal Jugular Venous V Waves Present] : normal jugular venous V waves present [No Jugular Venous Severino A Waves] : no jugular venous severino A waves [Heart Rate And Rhythm] : heart rate and rhythm were normal [Murmurs] : no murmurs present [Heart Sounds] : normal S1 and S2 [Arterial Pulses Normal] : the arterial pulses were normal [Veins - Varicosity Changes] : no varicosital changes were noted in the lower extremities [Respiration, Rhythm And Depth] : normal respiratory rhythm and effort [Exaggerated Use Of Accessory Muscles For Inspiration] : no accessory muscle use [Auscultation Breath Sounds / Voice Sounds] : lungs were clear to auscultation bilaterally [Chest Palpation] : palpation of the chest revealed no abnormalities [Lungs Percussion] : the lungs were normal to percussion [Bowel Sounds] : normal bowel sounds [Abdomen Tenderness] : non-tender [Abdomen Mass (___ Cm)] : no abdominal mass palpated [Abdomen Hernia] : no hernia was discovered [Abnormal Walk] : normal gait [Nail Clubbing] : no clubbing of the fingernails [Cyanosis, Localized] : no localized cyanosis [Nail Splinter Hemorrhages] : no splinter hemorrhages of the nails [Petechial Hemorrhages (___cm)] : no petechial hemorrhages [] : no ischemic changes [Fingers Osler's Nodes] : Osler's nodes were not seenon the fingers [Skin Color & Pigmentation] : normal skin color and pigmentation [Skin Turgor] : normal skin turgor [No Venous Stasis] : no venous stasis [Skin Lesions] : no skin lesions [No Skin Ulcers] : no skin ulcer [No Xanthoma] : no  xanthoma was observed [Oriented To Time, Place, And Person] : oriented to person, place, and time [FreeTextEntry1] : ambulates with cane

## 2021-03-01 NOTE — ADDENDUM
[FreeTextEntry1] : Review of testing performed are as follows:\par Echocardiogram 2/17/2021:\par Normal LV size and function ejection fraction 60 to 65% with focal aortic sclerosis but no evidence of any significant valvular disease.\par \par Pharmacologic nuclear stress test\par 2/22/2021:\par There is a small artifactual defect but no evidence of ischemia and the gated ejection fraction is within normal limits.\par \par Impression the patient has normal left ventricular systolic function, no significant valvular heart disease, and no inducible ischemia.\par \par Despite her limited functional capacity hyperlipidemia, there is no cardiac contraindication to patient proceeding with the aforementioned right knee replacement procedure.\par Ibuprofen and fish oil were to have been stopped preoperatively.  There are no other special cardiac precautions.\par Usual postoperative DVT prophylaxis as per your protocol.\par \par Feel free to contact me with regard to any questions.

## 2021-03-03 ENCOUNTER — INPATIENT (INPATIENT)
Facility: HOSPITAL | Age: 56
LOS: 1 days | Discharge: HOME | End: 2021-03-05

## 2021-03-03 DIAGNOSIS — Z98.89 OTHER SPECIFIED POSTPROCEDURAL STATES: Chronic | ICD-10-CM

## 2021-03-04 ENCOUNTER — OUTPATIENT (OUTPATIENT)
Dept: OUTPATIENT SERVICES | Facility: HOSPITAL | Age: 56
LOS: 1 days | End: 2021-03-04

## 2021-03-04 DIAGNOSIS — Z98.89 OTHER SPECIFIED POSTPROCEDURAL STATES: Chronic | ICD-10-CM

## 2021-03-05 ENCOUNTER — OUTPATIENT (OUTPATIENT)
Dept: OUTPATIENT SERVICES | Facility: HOSPITAL | Age: 56
LOS: 1 days | End: 2021-03-05

## 2021-03-05 DIAGNOSIS — Z98.89 OTHER SPECIFIED POSTPROCEDURAL STATES: Chronic | ICD-10-CM

## 2021-03-10 RX ORDER — KIT FOR THE PREPARATION OF TECHNETIUM TC99M SESTAMIBI 1 MG/5ML
INJECTION, POWDER, LYOPHILIZED, FOR SOLUTION PARENTERAL
Refills: 0 | Status: COMPLETED | OUTPATIENT
Start: 2021-03-10

## 2021-03-10 RX ADMIN — KIT FOR THE PREPARATION OF TECHNETIUM TC99M SESTAMIBI 0: 1 INJECTION, POWDER, LYOPHILIZED, FOR SOLUTION PARENTERAL at 00:00

## 2021-03-17 ENCOUNTER — APPOINTMENT (OUTPATIENT)
Dept: FAMILY MEDICINE | Facility: CLINIC | Age: 56
End: 2021-03-17
Payer: COMMERCIAL

## 2021-03-17 VITALS
WEIGHT: 293 LBS | HEIGHT: 64 IN | SYSTOLIC BLOOD PRESSURE: 130 MMHG | HEART RATE: 81 BPM | TEMPERATURE: 97.5 F | BODY MASS INDEX: 50.02 KG/M2 | OXYGEN SATURATION: 97 % | DIASTOLIC BLOOD PRESSURE: 80 MMHG

## 2021-03-17 DIAGNOSIS — Z01.818 ENCOUNTER FOR OTHER PREPROCEDURAL EXAMINATION: ICD-10-CM

## 2021-03-17 PROCEDURE — 99214 OFFICE O/P EST MOD 30 MIN: CPT

## 2021-03-17 PROCEDURE — 99072 ADDL SUPL MATRL&STAF TM PHE: CPT

## 2021-03-18 NOTE — PHYSICAL EXAM
[No Acute Distress] : no acute distress [Normal Sclera/Conjunctiva] : normal sclera/conjunctiva [No Lymphadenopathy] : no lymphadenopathy [No Respiratory Distress] : no respiratory distress  [Clear to Auscultation] : lungs were clear to auscultation bilaterally [Normal] : affect was normal and insight and judgment were intact [de-identified] : Mild right knee and right LE edema. No calf tenderness. DP/PT pulses 2+ b/l. [de-identified] : Surgical bandage right knee intact. No marginal erythema.

## 2021-03-18 NOTE — HISTORY OF PRESENT ILLNESS
[FreeTextEntry1] : Pt here to f/u from her right knee replacement.\par Pt had total right knee replacement due to advanced OA on 3/3/21.\par c/o right knee pain. Pain is 8.5/10\par She is taking oxy rapid relief, Tylenol and Meloxicam.\par c/o persistent nausea, worse after taking pain meds.\par Doing PT at home 3x/wk.\par She is also on ASA 81 mg bid.\par She is walking and showering on her own.

## 2021-03-18 NOTE — REVIEW OF SYSTEMS
[Fever] : no fever [Nausea] : nausea [Joint Pain] : joint pain [Unsteady Walking] : ataxia [Negative] : Psychiatric [FreeTextEntry6] : No sob or wheeze

## 2021-03-18 NOTE — PLAN
[FreeTextEntry1] : Triamterene-HCTZ on hold post op. Pt with decreased po intake due to nausea and also with mild post op anemia.\par Recheck bw and CBC at f/u.\par Continue with current pain regimen.\par Increase Omeprazole to 40 mg/d.\par F/u 2 wks.\par BW at f/u.\par Surgical f/u.\par Cont PT.

## 2021-04-01 ENCOUNTER — APPOINTMENT (OUTPATIENT)
Dept: FAMILY MEDICINE | Facility: CLINIC | Age: 56
End: 2021-04-01
Payer: COMMERCIAL

## 2021-04-01 PROCEDURE — 99213 OFFICE O/P EST LOW 20 MIN: CPT | Mod: 95

## 2021-04-01 NOTE — PLAN
[FreeTextEntry1] : Start Time: 11:50 am\par End Time: 12: 10 pm\par Non-face-to-face time includes chart review prior to initiation of visit, and post visit processing of orders, and prescriptions.\par \par

## 2021-04-01 NOTE — HISTORY OF PRESENT ILLNESS
[Home] : at home, [unfilled] , at the time of the visit. [Medical Office: ([facility])___] : at the medical office located in  [Verbal consent obtained from patient] : the patient, [unfilled] [FreeTextEntry1] : Pt c/o right knee pain, s/p TKR.\par She is doing PT with some improvement in mobility.\par She had anemia post op from her right TKA on 3/3/21\par Patient is following on anemia today as well. +fatigue.\par Anxiety controlled on current regimen.\par She was using Clobetasol for her scalp psoriasis and it was working well, but she ran out of supply.

## 2021-05-03 ENCOUNTER — APPOINTMENT (OUTPATIENT)
Dept: FAMILY MEDICINE | Facility: CLINIC | Age: 56
End: 2021-05-03
Payer: COMMERCIAL

## 2021-05-03 DIAGNOSIS — D50.0 IRON DEFICIENCY ANEMIA SECONDARY TO BLOOD LOSS (CHRONIC): ICD-10-CM

## 2021-05-03 DIAGNOSIS — Z87.898 PERSONAL HISTORY OF OTHER SPECIFIED CONDITIONS: ICD-10-CM

## 2021-05-03 PROCEDURE — 99214 OFFICE O/P EST MOD 30 MIN: CPT | Mod: 95

## 2021-05-03 RX ORDER — ZOLPIDEM TARTRATE 12.5 MG/1
12.5 TABLET, EXTENDED RELEASE ORAL
Qty: 30 | Refills: 1 | Status: DISCONTINUED | COMMUNITY
Start: 2020-03-04 | End: 2021-05-03

## 2021-05-03 NOTE — PLAN
[FreeTextEntry1] : Start Time: 10:30 am\par End Time: 11:00 am\par Non-face-to-face time includes chart review prior to initiation of visit, and post visit processing of orders, and prescriptions.\par \par

## 2021-05-03 NOTE — HISTORY OF PRESENT ILLNESS
[Home] : at home, [unfilled] , at the time of the visit. [Medical Office: ([facility])___] : at the medical office located in  [Verbal consent obtained from patient] : the patient, [unfilled] [FreeTextEntry1] : Patient is following up status post right knee replacement.\par Patient had a total right knee replacement 2 months ago.  She is complaining of increased fatigue and poor sleep quality.\par She did not like the extended release Ambien which made her feel more sedated than the immediate release.\par She had a sleep study in February 2021.  Results not available for review. \par She has a history of anemia and hyperlipidemia and is due for surveillance blood work.\par Patient states that she is walking more.  She is able to ambulate in the house without a cane.  She is getting in and out of her car by herself and driving.  She has lost 21 pounds since her right knee surgery and is motivated to continue to take measures to optimize her weight.  She will be seeing her nutritionist for consultation soon.\par Patient with generalized anxiety disorder and panic which has been controlled on the current dose of alprazolam.

## 2021-05-03 NOTE — HISTORY OF PRESENT ILLNESS
[Home] : at home, [unfilled] , at the time of the visit. [Medical Office: ([facility])___] : at the medical office located in  [Verbal consent obtained from patient] : the patient, [unfilled] [FreeTextEntry1] : Patient is following up status post right knee replacement.\par Patient had a total right knee replacement 2 months ago.  She is complaining of increased fatigue and poor sleep quality.\par She did not like the extended release Ambien which made her feel more sedated than the immediate release.\par She had a sleep study in February 2021.  Results not available for review. \par She has a history of anemia and hyperlipidemia and is due for surveillance blood work.\par Patient states that she is walking more.  She is able to ambulate in the house without a cane.  She is getting in and out of her car by herself and driving.  She has lost 21 pounds since her right knee surgery and is motivated to continue to take measures to optimize her weight.  She will be seeing her nutritionist for consultation soon.\par Patient with generalized anxiety disorder and panic which has been controlled on the current dose of alprazolam.

## 2021-05-03 NOTE — REVIEW OF SYSTEMS
[Insomnia] : insomnia [Anxiety] : anxiety [Negative] : Respiratory [Joint Pain] : no joint pain [Depression] : no depression

## 2021-05-03 NOTE — ASSESSMENT
[FreeTextEntry1] : Patient encouraged to continue with weight loss efforts.\par Change Ambien ER to Ambien immediate release.\par Continue with current medications for panic disorder.\par Follow-up in 1 month.\par Fasting blood work prior to follow-up.\par Pt to forward sleep study results from February, 2021.

## 2021-06-02 ENCOUNTER — APPOINTMENT (OUTPATIENT)
Dept: FAMILY MEDICINE | Facility: CLINIC | Age: 56
End: 2021-06-02
Payer: COMMERCIAL

## 2021-06-02 VITALS
OXYGEN SATURATION: 97 % | WEIGHT: 293 LBS | DIASTOLIC BLOOD PRESSURE: 84 MMHG | HEIGHT: 64 IN | HEART RATE: 90 BPM | SYSTOLIC BLOOD PRESSURE: 132 MMHG | BODY MASS INDEX: 50.02 KG/M2

## 2021-06-02 DIAGNOSIS — K59.04 CHRONIC IDIOPATHIC CONSTIPATION: ICD-10-CM

## 2021-06-02 DIAGNOSIS — Z79.2 LONG TERM (CURRENT) USE OF ANTIBIOTICS: ICD-10-CM

## 2021-06-02 PROCEDURE — 99214 OFFICE O/P EST MOD 30 MIN: CPT

## 2021-06-02 PROCEDURE — 99072 ADDL SUPL MATRL&STAF TM PHE: CPT

## 2021-06-02 RX ORDER — ASPIRIN 81 MG/1
81 TABLET, COATED ORAL
Qty: 90 | Refills: 0 | Status: DISCONTINUED | COMMUNITY
Start: 2021-03-05

## 2021-06-02 NOTE — PHYSICAL EXAM
[Normal] : affect was normal and insight and judgment were intact [de-identified] : full extension right lower extremity. Right knee surgical scar healing nicely.

## 2021-06-02 NOTE — HISTORY OF PRESENT ILLNESS
[FreeTextEntry1] : Patient following up anxiety and GERD.\par She is also requesting prophylactic abx for upcoming dental procedure on 6/27, as she is S/P right knee replacement x 3 months. She continues to feel discomfort in her right knee although it has improved since initial recovery.\par Anxiety has increased since pandemic and surgery but she has been coping.

## 2021-06-02 NOTE — REVIEW OF SYSTEMS
[Joint Pain] : joint pain [Suicidal] : not suicidal [Depression] : depression [Anxiety] : anxiety [Negative] : Neurological [de-identified] : stable

## 2021-06-02 NOTE — PLAN
[FreeTextEntry1] : Pt is stable.\par Continue current medications.\par Pt made aware that the medications may cause drowsiness, or driving impairment. She is cautioned regarding the use of this medication while driving or operating heavy machinery.\par Pt is aware not to take the oxycodone when she is taking alprazolam due to risk of increased sedation.\par F/U 1 month.\par \par \par \par \par Moderate complexity decision making based on pt's multiple chronic medical conditions and polypharmacy.\par

## 2021-06-16 ENCOUNTER — NON-APPOINTMENT (OUTPATIENT)
Age: 56
End: 2021-06-16

## 2021-06-16 ENCOUNTER — APPOINTMENT (OUTPATIENT)
Dept: INTERNAL MEDICINE | Facility: CLINIC | Age: 56
End: 2021-06-16
Payer: COMMERCIAL

## 2021-06-16 VITALS
BODY MASS INDEX: 50.02 KG/M2 | WEIGHT: 293 LBS | DIASTOLIC BLOOD PRESSURE: 88 MMHG | HEART RATE: 96 BPM | HEIGHT: 64 IN | TEMPERATURE: 98.5 F | SYSTOLIC BLOOD PRESSURE: 126 MMHG | OXYGEN SATURATION: 98 %

## 2021-06-16 PROCEDURE — 36415 COLL VENOUS BLD VENIPUNCTURE: CPT

## 2021-06-16 PROCEDURE — 99203 OFFICE O/P NEW LOW 30 MIN: CPT | Mod: 25

## 2021-06-16 PROCEDURE — 99072 ADDL SUPL MATRL&STAF TM PHE: CPT

## 2021-06-16 NOTE — ASSESSMENT
[FreeTextEntry1] : 1) HTN\par - stable\par - off od diuretic\par - check CMP /\par \par 2) HL\par - ct diet\par - check lipid \par \par 3 ) OA\par - ct PT at home \par \par \par 4) needs labs - ? anemia\par \par 5) Nutrition eval

## 2021-06-16 NOTE — HISTORY OF PRESENT ILLNESS
[FreeTextEntry1] : pt is here referred by DR Alcantar \par not clear as to why , but she says it is for weight management \par she does not wish to follow w/ bariatric sx \par she reports that she does not have XENIA\par \par H/O HTN \par taking meds as adv \par

## 2021-06-16 NOTE — HEALTH RISK ASSESSMENT
[Good] : ~his/her~  mood as  good [] : No [No] : No [Monthly or less (1 pt)] : Monthly or less (1 point) [0] : 2) Feeling down, depressed, or hopeless: Not at all (0) [de-identified] : none- bc knee pain  [de-identified] : regular  [Patient reported colonoscopy was normal] : Patient reported colonoscopy was normal [Change in mental status noted] : No change in mental status noted [None] : None [With Significant Other] : lives with significant other [Employed] : employed [Significant Other] : lives with significant other [Sexually Active] : sexually active [Reports changes in hearing] : Reports no changes in hearing [Reports changes in vision] : Reports no changes in vision [Reports changes in dental health] : Reports no changes in dental health [Smoke Detector] : smoke detector [Carbon Monoxide Detector] : carbon monoxide detector [Safety elements used in home] : safety elements used in home [Seat Belt] :  uses seat belt [MammogramDate] : 2019 [ColonoscopyDate] : 2017

## 2021-06-17 LAB
ALBUMIN SERPL ELPH-MCNC: 4.5 G/DL
ALP BLD-CCNC: 126 U/L
ALT SERPL-CCNC: 5 U/L
ANION GAP SERPL CALC-SCNC: 10 MMOL/L
AST SERPL-CCNC: 15 U/L
BASOPHILS # BLD AUTO: 0.04 K/UL
BASOPHILS NFR BLD AUTO: 0.7 %
BILIRUB SERPL-MCNC: 0.3 MG/DL
BUN SERPL-MCNC: 12 MG/DL
CALCIUM SERPL-MCNC: 9.9 MG/DL
CHLORIDE SERPL-SCNC: 102 MMOL/L
CHOLEST SERPL-MCNC: 248 MG/DL
CO2 SERPL-SCNC: 26 MMOL/L
CREAT SERPL-MCNC: 0.79 MG/DL
EOSINOPHIL # BLD AUTO: 0.07 K/UL
EOSINOPHIL NFR BLD AUTO: 1.2 %
ESTIMATED AVERAGE GLUCOSE: 108 MG/DL
FERRITIN SERPL-MCNC: 48 NG/ML
FOLATE SERPL-MCNC: 10.1 NG/ML
GLUCOSE SERPL-MCNC: 106 MG/DL
HBA1C MFR BLD HPLC: 5.4 %
HCT VFR BLD CALC: 41.8 %
HDLC SERPL-MCNC: 65 MG/DL
HGB BLD-MCNC: 13.2 G/DL
IMM GRANULOCYTES NFR BLD AUTO: 0.2 %
LDLC SERPL CALC-MCNC: 142 MG/DL
LYMPHOCYTES # BLD AUTO: 1.77 K/UL
LYMPHOCYTES NFR BLD AUTO: 29.3 %
MAN DIFF?: NORMAL
MCHC RBC-ENTMCNC: 29.5 PG
MCHC RBC-ENTMCNC: 31.6 GM/DL
MCV RBC AUTO: 93.3 FL
MONOCYTES # BLD AUTO: 0.34 K/UL
MONOCYTES NFR BLD AUTO: 5.6 %
NEUTROPHILS # BLD AUTO: 3.81 K/UL
NEUTROPHILS NFR BLD AUTO: 63 %
NONHDLC SERPL-MCNC: 182 MG/DL
PLATELET # BLD AUTO: 267 K/UL
POTASSIUM SERPL-SCNC: 4.3 MMOL/L
PROT SERPL-MCNC: 6.8 G/DL
RBC # BLD: 4.48 M/UL
RBC # FLD: 13.8 %
SODIUM SERPL-SCNC: 138 MMOL/L
TRIGL SERPL-MCNC: 202 MG/DL
VIT B12 SERPL-MCNC: 686 PG/ML
WBC # FLD AUTO: 6.04 K/UL

## 2021-06-24 ENCOUNTER — NON-APPOINTMENT (OUTPATIENT)
Age: 56
End: 2021-06-24

## 2021-06-25 ENCOUNTER — APPOINTMENT (OUTPATIENT)
Dept: FAMILY MEDICINE | Facility: CLINIC | Age: 56
End: 2021-06-25
Payer: COMMERCIAL

## 2021-06-25 PROCEDURE — ZZZZZ: CPT

## 2021-06-25 PROCEDURE — 99214 OFFICE O/P EST MOD 30 MIN: CPT | Mod: 95

## 2021-06-25 NOTE — HISTORY OF PRESENT ILLNESS
[Home] : at home, [unfilled] , at the time of the visit. [Medical Office: ([facility])___] : at the medical office located in  [FreeTextEntry1] : Pt c/o dry cough x 3 days. \par Cough was productive of clear sputum 2 days ago which pt states caused her to vomit x 1. Temp to 100.1 2 days ago. No sob.\par No known COVID-19 contacts.  Patient has not been vaccinated against COVID-19 due to contraindication due to severe allergy to influenza vaccine.\par Patient is also following up on anxiety and insomnia.\par Pt prone to anxiety with panic attacks and has had good results with alprazolam.\par Also has chronic insomnia.

## 2021-06-25 NOTE — PLAN
[FreeTextEntry1] : Start Time: 11:00 am\par End Time: 11:30 am\par Non-face-to-face time includes chart review prior to initiation of visit, and post visit processing of orders, and prescriptions.\par \par

## 2021-06-25 NOTE — REVIEW OF SYSTEMS
[Fever] : fever [Shortness Of Breath] : no shortness of breath [Cough] : cough [Abdominal Pain] : no abdominal pain [Vomiting] : vomiting [Joint Pain] : joint pain [Anxiety] : anxiety [Negative] : Neurological

## 2021-06-25 NOTE — ASSESSMENT
[FreeTextEntry1] : Viral URI poss Covid-19.\par Pt is advised to self quarantine for 10 days from onset of symptoms, and isolate from family members. Pt is aware that she must be fever free for 24 hours and have improving symptoms for 72 hours before terminating quarantine.\par Pt to contact our office is Covid test is positive so tx may be customized to facilitate healing.

## 2021-06-26 ENCOUNTER — TRANSCRIPTION ENCOUNTER (OUTPATIENT)
Age: 56
End: 2021-06-26

## 2021-06-26 LAB — SARS-COV-2 N GENE NPH QL NAA+PROBE: NOT DETECTED

## 2021-06-28 ENCOUNTER — NON-APPOINTMENT (OUTPATIENT)
Age: 56
End: 2021-06-28

## 2021-06-29 ENCOUNTER — NON-APPOINTMENT (OUTPATIENT)
Age: 56
End: 2021-06-29

## 2021-06-29 VITALS — BODY MASS INDEX: 52.53 KG/M2 | WEIGHT: 293 LBS

## 2021-07-01 ENCOUNTER — TRANSCRIPTION ENCOUNTER (OUTPATIENT)
Age: 56
End: 2021-07-01

## 2021-08-04 ENCOUNTER — TRANSCRIPTION ENCOUNTER (OUTPATIENT)
Age: 56
End: 2021-08-04

## 2021-08-04 ENCOUNTER — APPOINTMENT (OUTPATIENT)
Dept: FAMILY MEDICINE | Facility: CLINIC | Age: 56
End: 2021-08-04

## 2021-08-05 ENCOUNTER — APPOINTMENT (OUTPATIENT)
Dept: FAMILY MEDICINE | Facility: CLINIC | Age: 56
End: 2021-08-05
Payer: COMMERCIAL

## 2021-08-05 DIAGNOSIS — J06.9 ACUTE UPPER RESPIRATORY INFECTION, UNSPECIFIED: ICD-10-CM

## 2021-08-05 PROCEDURE — 99214 OFFICE O/P EST MOD 30 MIN: CPT | Mod: 95

## 2021-08-05 NOTE — ASSESSMENT
[FreeTextEntry1] : Increased anxiety. Recommend Buspirone for maintenance therapy and alprazolam prn for exacerbations.\par Potential side effects and benefits reviewed with pt.\par Check Covid ab to r/o asymptomatic prior infection.

## 2021-08-05 NOTE — HISTORY OF PRESENT ILLNESS
[Home] : at home, [unfilled] , at the time of the visit. [Medical Office: ([facility])___] : at the medical office located in  [Verbal consent obtained from patient] : the patient, [unfilled] [FreeTextEntry8] : Patient C/O increasing anxiety x 1 month\par Patient C/O worsening insomnia x 1 month.\par Patient has increased stress at work, and also complaining of increased stress relating to impact from Covid pandemic.  Anxiety affecting daily activities more. She is very careful and avoids going out and less necessary.  She has had increased panic flares and is overall doing well on alprazolam.  Insomnia has been worse and having more difficulty falling asleep.\par Pt had increased close exposure to people with covid as well as several bouts of URI (testing covid negative) and she is wondering if she had prior covid infection.

## 2021-08-05 NOTE — END OF VISIT
[FreeTextEntry3] : Start time: 8:10 am\par End Time: 8:40 am\par Non-face-to-face time includes chart review prior to initiation of visit, and post visit processing of orders, and prescriptions.\par

## 2021-08-09 ENCOUNTER — APPOINTMENT (OUTPATIENT)
Dept: FAMILY MEDICINE | Facility: CLINIC | Age: 56
End: 2021-08-09

## 2021-08-10 DIAGNOSIS — D12.6 BENIGN NEOPLASM OF COLON, UNSPECIFIED: ICD-10-CM

## 2021-08-22 ENCOUNTER — TRANSCRIPTION ENCOUNTER (OUTPATIENT)
Age: 56
End: 2021-08-22

## 2021-08-22 LAB
COVID-19 NUCLEOCAPSID  GAM ANTIBODY INTERPRETATION: NEGATIVE
SARS-COV-2 AB SERPL QL IA: 0.07 INDEX

## 2021-09-01 ENCOUNTER — APPOINTMENT (OUTPATIENT)
Dept: FAMILY MEDICINE | Facility: CLINIC | Age: 56
End: 2021-09-01
Payer: COMMERCIAL

## 2021-09-01 PROCEDURE — 99214 OFFICE O/P EST MOD 30 MIN: CPT | Mod: 95

## 2021-09-01 RX ORDER — AMOXICILLIN 500 MG/1
500 TABLET, FILM COATED ORAL
Qty: 4 | Refills: 3 | Status: DISCONTINUED | COMMUNITY
Start: 2021-06-02 | End: 2021-09-01

## 2021-09-01 NOTE — PHYSICAL EXAM
[No Acute Distress] : no acute distress [No Respiratory Distress] : no respiratory distress  [Normal] : affect was normal and insight and judgment were intact [de-identified] : Voice sounds nasal

## 2021-09-01 NOTE — ASSESSMENT
[FreeTextEntry1] : Start Augmentin for bacterial sinusitis.\par Albuterol MDI prn.\par r/o Covid. Test in 2 days, sooner if worse.\par Self quarantine until covid testing resulted and further recommendations pending results.

## 2021-09-01 NOTE — REVIEW OF SYSTEMS
[Fever] : no fever [Nasal Discharge] : nasal discharge [Postnasal Drip] : postnasal drip [Shortness Of Breath] : no shortness of breath [Cough] : cough [Anxiety] : anxiety [Negative] : Gastrointestinal

## 2021-09-01 NOTE — HISTORY OF PRESENT ILLNESS
[Home] : at home, [unfilled] , at the time of the visit. [Medical Office: ([facility])___] : at the medical office located in  [Verbal consent obtained from patient] : the patient, [unfilled] [FreeTextEntry1] : Patient complains of nasal congestion and cough x10 days.\par She is experiencing yellow nasal discharge and her cough is intermittently productive of yellow sputum.  She denies any fever, or shortness of breath.  Oxygen saturation is 97%.  She has had contact with a person who tested positive for COVID-19 2 days ago.  She stayed socially distanced and spent 3-1/2 hours with the , both parties wearing a mask, and found out later that evening that the person tested positive for COVID-19.  The patient has not had any change in her symptoms since her contact.  Her cough has stayed the same as it had been 10 days ago.\par Patient is doing well on the buspirone and has noted some decrease in her daily anxiety.\par Knee pain has been persistent and patient had good results with Naprosyn in the past.  She denies any epigastric or abdominal discomfort.\par Patient is also complaining of a rash at the mons pubis region.  There is some itching and also she has noted a slight bubbling starting at the rash site.

## 2021-09-01 NOTE — PLAN
[FreeTextEntry1] : Start Time: 11:55 am\par End Time: 12:30 pm\par Non-face-to-face time includes chart review prior to initiation of visit, and post visit processing of orders, and prescriptions.\par \par

## 2021-09-03 ENCOUNTER — APPOINTMENT (OUTPATIENT)
Dept: FAMILY MEDICINE | Facility: CLINIC | Age: 56
End: 2021-09-03
Payer: COMMERCIAL

## 2021-09-03 PROCEDURE — 99211 OFF/OP EST MAY X REQ PHY/QHP: CPT

## 2021-09-09 LAB — SARS-COV-2 N GENE NPH QL NAA+PROBE: NOT DETECTED

## 2021-09-18 ENCOUNTER — NON-APPOINTMENT (OUTPATIENT)
Age: 56
End: 2021-09-18

## 2021-09-18 ENCOUNTER — APPOINTMENT (OUTPATIENT)
Dept: OBGYN | Facility: CLINIC | Age: 56
End: 2021-09-18
Payer: COMMERCIAL

## 2021-09-18 VITALS
SYSTOLIC BLOOD PRESSURE: 120 MMHG | WEIGHT: 293 LBS | DIASTOLIC BLOOD PRESSURE: 68 MMHG | BODY MASS INDEX: 50.02 KG/M2 | HEIGHT: 64 IN

## 2021-09-18 DIAGNOSIS — Z01.419 ENCOUNTER FOR GYNECOLOGICAL EXAMINATION (GENERAL) (ROUTINE) W/OUT ABNORMAL FINDINGS: ICD-10-CM

## 2021-09-18 PROCEDURE — 99396 PREV VISIT EST AGE 40-64: CPT

## 2021-09-18 NOTE — PHYSICAL EXAM
[Appropriately responsive] : appropriately responsive [No Acute Distress] : no acute distress [Alert] : alert [No Lymphadenopathy] : no lymphadenopathy [Regular Rate Rhythm] : regular rate rhythm [No Murmurs] : no murmurs [Clear to Auscultation B/L] : clear to auscultation bilaterally [Soft] : soft [Non-tender] : non-tender [No HSM] : No HSM [Non-distended] : non-distended [No Lesions] : no lesions [No Mass] : no mass [Oriented x3] : oriented x3 [Examination Of The Breasts] : a normal appearance [No Masses] : no breast masses were palpable [Labia Majora] : normal [Labia Minora] : normal [Normal] : normal [Uterine Adnexae] : normal

## 2021-09-18 NOTE — HISTORY OF PRESENT ILLNESS
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

## 2021-09-18 NOTE — PLAN
[FreeTextEntry1] : Follow-up for bone density screening with primary care physician\par The benefits of a total body skin exam with dermatology reviewed\par Importance of calcium daily exercise and vitamin D reviewed\par Follow-up in 1 year for annual gynecological exams

## 2021-09-18 NOTE — COUNSELING
[Vitamins/Supplements] : vitamins/supplements [Nutrition/ Exercise/ Weight Management] : nutrition, exercise, weight management [Sunscreen] : sunscreen [Breast Self Exam] : breast self exam [Bladder Hygiene] : bladder hygiene [Vaccines] : vaccines

## 2021-10-01 ENCOUNTER — APPOINTMENT (OUTPATIENT)
Dept: FAMILY MEDICINE | Facility: CLINIC | Age: 56
End: 2021-10-01
Payer: COMMERCIAL

## 2021-10-01 VITALS
TEMPERATURE: 97.4 F | DIASTOLIC BLOOD PRESSURE: 84 MMHG | HEIGHT: 64 IN | SYSTOLIC BLOOD PRESSURE: 114 MMHG | WEIGHT: 293 LBS | HEART RATE: 81 BPM | OXYGEN SATURATION: 98 % | BODY MASS INDEX: 50.02 KG/M2

## 2021-10-01 DIAGNOSIS — Z12.39 ENCOUNTER FOR OTHER SCREENING FOR MALIGNANT NEOPLASM OF BREAST: ICD-10-CM

## 2021-10-01 DIAGNOSIS — Z87.09 PERSONAL HISTORY OF OTHER DISEASES OF THE RESPIRATORY SYSTEM: ICD-10-CM

## 2021-10-01 DIAGNOSIS — B96.89 PERSONAL HISTORY OF OTHER DISEASES OF THE RESPIRATORY SYSTEM: ICD-10-CM

## 2021-10-01 DIAGNOSIS — Z87.898 PERSONAL HISTORY OF OTHER SPECIFIED CONDITIONS: ICD-10-CM

## 2021-10-01 DIAGNOSIS — L40.9 PSORIASIS, UNSPECIFIED: ICD-10-CM

## 2021-10-01 PROCEDURE — 99214 OFFICE O/P EST MOD 30 MIN: CPT

## 2021-10-01 RX ORDER — EPINEPHRINE 0.3 MG/.3ML
0.3 INJECTION INTRAMUSCULAR
Qty: 1 | Refills: 2 | Status: ACTIVE | COMMUNITY
Start: 2018-07-03 | End: 1900-01-01

## 2021-10-01 NOTE — PHYSICAL EXAM
[No Acute Distress] : no acute distress [Normal Sclera/Conjunctiva] : normal sclera/conjunctiva [No Respiratory Distress] : no respiratory distress  [Clear to Auscultation] : lungs were clear to auscultation bilaterally [Normal Rate] : normal rate  [Regular Rhythm] : with a regular rhythm [Normal S1, S2] : normal S1 and S2 [No Murmur] : no murmur heard [No Focal Deficits] : no focal deficits [Normal Mood] : the mood was normal [Normal] : affect was normal and insight and judgment were intact [de-identified] : lymphedema b/l

## 2021-10-01 NOTE — ASSESSMENT
[FreeTextEntry1] : Pt is stable.\par Continue current medications.\par Pt encounter incorporated clinical review of the medical record including consultation from specialists, review of lab and diagnostic testing with interpretation and discussion of results with patient, general pt counseling, and coordination of care, as well as documentation update within the electronic medical record.\par \par Time spent: 30 mins.\par

## 2021-10-01 NOTE — HISTORY OF PRESENT ILLNESS
[FreeTextEntry1] : Patient is following up on insomnia, anxiety, and chronic knee pain.\par Colonoscopy, pap, and mammo completed and pt reports all were good.\par To start with .\par Also to consult with wt management center.\par She has had improved mobility and less pain since her total right knee replacement in 3/2021.\par Anxiety is stable on current medications.\par Scalp psoriasis is recurrent.\par Saw GI recently for f/u of her liver lesion which was c/w hepatic hemangioma.

## 2021-10-01 NOTE — DATA REVIEWED
[FreeTextEntry1] : Abd u/s 7/26/21: Mildly enlarged fatty liver. Mild splenomegaly. The apparent liver hemangioma described on prior studies is not seen on today's study. Left ovary not visualized Normal uterus.\par \par CT abd/pelvis w/ contrast 6/2020: Small hypodense focus in the posterior dome o the liver which may be a hemangioma as seen on MRI in 2016.

## 2021-10-05 ENCOUNTER — APPOINTMENT (OUTPATIENT)
Dept: FAMILY MEDICINE | Facility: CLINIC | Age: 56
End: 2021-10-05
Payer: COMMERCIAL

## 2021-10-05 PROCEDURE — 99441: CPT

## 2021-10-06 ENCOUNTER — APPOINTMENT (OUTPATIENT)
Dept: BARIATRICS/WEIGHT MGMT | Facility: CLINIC | Age: 56
End: 2021-10-06
Payer: COMMERCIAL

## 2021-10-06 VITALS
SYSTOLIC BLOOD PRESSURE: 136 MMHG | HEIGHT: 64 IN | DIASTOLIC BLOOD PRESSURE: 86 MMHG | WEIGHT: 293 LBS | HEART RATE: 76 BPM | OXYGEN SATURATION: 95 % | TEMPERATURE: 97.6 F | BODY MASS INDEX: 50.02 KG/M2

## 2021-10-06 DIAGNOSIS — Z86.59 PERSONAL HISTORY OF OTHER MENTAL AND BEHAVIORAL DISORDERS: ICD-10-CM

## 2021-10-06 PROCEDURE — 99204 OFFICE O/P NEW MOD 45 MIN: CPT

## 2021-10-06 PROCEDURE — 99214 OFFICE O/P EST MOD 30 MIN: CPT

## 2021-10-06 RX ORDER — OXYCODONE 5 MG/1
5 TABLET ORAL
Qty: 10 | Refills: 0 | Status: DISCONTINUED | COMMUNITY
Start: 2021-04-26 | End: 2021-10-06

## 2021-10-06 NOTE — HISTORY OF PRESENT ILLNESS
[Home] : at home, [unfilled] , at the time of the visit. [Medical Office: ([facility])___] : at the medical office located in  [Verbal consent obtained from patient] : the patient, [unfilled] [FreeTextEntry1] : PT experiencing sore throat, watery eyes, sneeze, 101 fever, and a tickle in throat since yesterday. Pt denies any sob,cough, or GI symptoms. Pt has been taking aspirin to treat symptoms with little relief. PT was last tested for COVID on 09/03/2021 and states that she refuses to do another test. [de-identified] : Patient further notes that  left air conditioner on overnight  and this might have triggered symptoms. She denies nasal congestion, pain over sinuses. She states she cannot be sick as she has a lot to do and wanted to get ahead of symptoms.

## 2021-10-06 NOTE — REASON FOR VISIT
[Initial Consultation] : an initial consultation for [Obesity] : obesity [FreeTextEntry2] : Here for help with wt management

## 2021-10-06 NOTE — CONSULT LETTER
[Dear  ___] : Dear  [unfilled], [Please see my note below.] : Please see my note below. [Consult Closing:] : Thank you very much for allowing me to participate in the care of this patient.  If you have any questions, please do not hesitate to contact me. [FreeTextEntry3] : Kamini Mendosa MD

## 2021-10-06 NOTE — PHYSICAL EXAM
[Obese, well nourished, in no acute distress] : obese, well nourished, in no acute distress [Normal] : affect appropriate [de-identified] : obese with old striae

## 2021-10-06 NOTE — REVIEW OF SYSTEMS
[MED-TRACEY-Carmine-FT] : improving right knee pain post TKA on the right in 3/21 [MED-ROS-Psych-FT] : panic attacks
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
## 2021-10-06 NOTE — END OF VISIT
[FreeTextEntry3] : start 12:35pm\par end   12:42 pm [Time Spent: ___ minutes] : I have spent [unfilled] minutes of time on the encounter.
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
## 2021-10-06 NOTE — PLAN
[FreeTextEntry1] : Supportive treat: Ibuprofen for throat pain, along with increase fluids intake.\par Follow up if not improved in 2-3 days.

## 2021-10-07 ENCOUNTER — NON-APPOINTMENT (OUTPATIENT)
Age: 56
End: 2021-10-07

## 2021-10-09 LAB
CYTOLOGY CVX/VAG DOC THIN PREP: NORMAL
HPV HIGH+LOW RISK DNA PNL CVX: NOT DETECTED

## 2021-10-22 ENCOUNTER — NON-APPOINTMENT (OUTPATIENT)
Age: 56
End: 2021-10-22

## 2021-10-22 LAB
25(OH)D3 SERPL-MCNC: 18.8 NG/ML
ALBUMIN SERPL ELPH-MCNC: 4.9 G/DL
ALP BLD-CCNC: 128 U/L
ALT SERPL-CCNC: 11 U/L
ANION GAP SERPL CALC-SCNC: 17 MMOL/L
AST SERPL-CCNC: 27 U/L
BASOPHILS # BLD AUTO: 0.04 K/UL
BASOPHILS NFR BLD AUTO: 0.6 %
BILIRUB SERPL-MCNC: 0.7 MG/DL
BUN SERPL-MCNC: 12 MG/DL
CALCIUM SERPL-MCNC: 9.9 MG/DL
CHLORIDE SERPL-SCNC: 102 MMOL/L
CHOLEST SERPL-MCNC: 230 MG/DL
CO2 SERPL-SCNC: 22 MMOL/L
CREAT SERPL-MCNC: 0.74 MG/DL
CRP SERPL-MCNC: 5 MG/L
EOSINOPHIL # BLD AUTO: 0.07 K/UL
EOSINOPHIL NFR BLD AUTO: 1.1 %
ESTIMATED AVERAGE GLUCOSE: 108 MG/DL
GLUCOSE SERPL-MCNC: 89 MG/DL
HBA1C MFR BLD HPLC: 5.4 %
HCT VFR BLD CALC: 41.4 %
HDLC SERPL-MCNC: 75 MG/DL
HGB BLD-MCNC: 13.8 G/DL
IMM GRANULOCYTES NFR BLD AUTO: 0.2 %
INSULIN SERPL-MCNC: 5.2 UU/ML
LDLC SERPL CALC-MCNC: 136 MG/DL
LYMPHOCYTES # BLD AUTO: 2.27 K/UL
LYMPHOCYTES NFR BLD AUTO: 34.1 %
MAN DIFF?: NORMAL
MCHC RBC-ENTMCNC: 30.2 PG
MCHC RBC-ENTMCNC: 33.3 GM/DL
MCV RBC AUTO: 90.6 FL
MONOCYTES # BLD AUTO: 0.35 K/UL
MONOCYTES NFR BLD AUTO: 5.3 %
NEUTROPHILS # BLD AUTO: 3.92 K/UL
NEUTROPHILS NFR BLD AUTO: 58.7 %
NONHDLC SERPL-MCNC: 155 MG/DL
PLATELET # BLD AUTO: 235 K/UL
POTASSIUM SERPL-SCNC: 4.2 MMOL/L
PROT SERPL-MCNC: 7.3 G/DL
RBC # BLD: 4.57 M/UL
RBC # FLD: 13 %
SODIUM SERPL-SCNC: 141 MMOL/L
T3FREE SERPL-MCNC: 2.78 PG/ML
T4 FREE SERPL-MCNC: 1.4 NG/DL
TRIGL SERPL-MCNC: 96 MG/DL
TSH SERPL-ACNC: 2.03 UIU/ML
WBC # FLD AUTO: 6.66 K/UL

## 2021-10-27 ENCOUNTER — APPOINTMENT (OUTPATIENT)
Dept: BARIATRICS/WEIGHT MGMT | Facility: CLINIC | Age: 56
End: 2021-10-27
Payer: COMMERCIAL

## 2021-10-27 VITALS
HEART RATE: 86 BPM | WEIGHT: 293 LBS | BODY MASS INDEX: 50.02 KG/M2 | TEMPERATURE: 97.8 F | HEIGHT: 64 IN | OXYGEN SATURATION: 95 % | SYSTOLIC BLOOD PRESSURE: 129 MMHG | DIASTOLIC BLOOD PRESSURE: 84 MMHG | RESPIRATION RATE: 16 BRPM

## 2021-10-27 DIAGNOSIS — G43.009 MIGRAINE W/OUT AURA, NOT INTRACTABLE, W/OUT STATUS MIGRAINOSUS: ICD-10-CM

## 2021-10-27 PROCEDURE — 99213 OFFICE O/P EST LOW 20 MIN: CPT | Mod: 95

## 2021-10-27 RX ORDER — PRUCALOPRIDE 2 MG/1
2 TABLET, FILM COATED ORAL DAILY
Qty: 30 | Refills: 0 | Status: DISCONTINUED | COMMUNITY
Start: 2021-06-02 | End: 2021-10-27

## 2021-10-27 NOTE — HISTORY OF PRESENT ILLNESS
[FreeTextEntry1] : Here for follow up for wt management , states she has clearly changed her eating habits, changed foods that she buys, no more cheetos, no crackers, nothing fried, feels better, stomach feels better , stopped the motegrity and no longer constipated. In general her hunger is improved and using humus and carrots as a snack.Eating 3 fruits a day\par tolerating metformin on 3 tabs\par \par Breakfast: \par does not eat\par \par Lunch \par chicken sandwich on 647 with lettuce and tomatoes\par \par snack \par humus with vegetables \par \par dinner \par salmon \par steamed vegetables\par : cauliflower , broccoli, snap peas \par very careful about portions\par \par exer\par  2/wk\par home exer\par awaiting Ortho clearance for increased exer\par \par sleep \par 9p  to 2 am \par \par

## 2021-10-27 NOTE — PHYSICAL EXAM
[Obese, well nourished, in no acute distress] : obese, well nourished, in no acute distress [Normal] : normoactive bowel sounds, soft and nontender, no hepatosplenomegaly or masses appreciated
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
## 2021-11-02 ENCOUNTER — APPOINTMENT (OUTPATIENT)
Dept: FAMILY MEDICINE | Facility: CLINIC | Age: 56
End: 2021-11-02
Payer: COMMERCIAL

## 2021-11-02 DIAGNOSIS — J02.9 ACUTE PHARYNGITIS, UNSPECIFIED: ICD-10-CM

## 2021-11-02 PROCEDURE — 99214 OFFICE O/P EST MOD 30 MIN: CPT | Mod: 95

## 2021-11-02 NOTE — PHYSICAL EXAM
[de-identified] : Telehealth precludes traditional, comprehensive physical exam. Patient appeared stable and alert.

## 2021-11-02 NOTE — HISTORY OF PRESENT ILLNESS
[Home] : at home, [unfilled] , at the time of the visit. [Medical Office: ([facility])___] : at the medical office located in  [Verbal consent obtained from patient] : the patient, [unfilled] [FreeTextEntry1] : cough [de-identified] :  Ms. DAMION ALVARADO is a 56 year  female with past medical hx as listed, in telehealth encounter c/o cough. Pt has been experiencing a barky productive cough with clear sputum for 2 days, along with sore throat, congestion, 101 degree fever, left ear pain, and runny nose with yellow discharge. Pt also reports she has white spot on the back of her throat, and denies any sob, headache, or GI symptoms. Pt has been taking Robitussin and cough drops which bring some relief. Pt has not been tested for COVID and refuses to test.\par Patient is also requesting refills for xanax and zolpidem.\par

## 2021-11-03 ENCOUNTER — APPOINTMENT (OUTPATIENT)
Dept: FAMILY MEDICINE | Facility: CLINIC | Age: 56
End: 2021-11-03

## 2021-11-18 ENCOUNTER — APPOINTMENT (OUTPATIENT)
Dept: FAMILY MEDICINE | Facility: CLINIC | Age: 56
End: 2021-11-18
Payer: COMMERCIAL

## 2021-11-18 PROCEDURE — 99213 OFFICE O/P EST LOW 20 MIN: CPT | Mod: 95

## 2021-11-19 ENCOUNTER — APPOINTMENT (OUTPATIENT)
Dept: FAMILY MEDICINE | Facility: CLINIC | Age: 56
End: 2021-11-19
Payer: COMMERCIAL

## 2021-11-19 PROCEDURE — ZZZZZ: CPT

## 2021-11-20 NOTE — HISTORY OF PRESENT ILLNESS
[Home] : at home, [unfilled] , at the time of the visit. [Medical Office: ([facility])___] : at the medical office located in  [Verbal consent obtained from patient] : the patient, [unfilled] [FreeTextEntry1] : Pt complains of productive of yellow sputum  and increasing sinus congestion x 2 weeks.\par Pt also with sore throat, body aches, and left ear pain. She denies fever or SOB.\par URI symptoms began 2 wks ago and she has completed Augmentin course with no relief of symptoms. \par No known Covid-19 contacts.

## 2021-11-20 NOTE — REVIEW OF SYSTEMS
[Fever] : no fever [Shortness Of Breath] : no shortness of breath [Cough] : cough [Negative] : Psychiatric

## 2021-11-20 NOTE — ASSESSMENT
[FreeTextEntry1] : Would r/o Covid-19 infection.\par Pt declines Covid PCR testing today, but is agreeable to come to office tomorrow for her PCR swab.\par She is advised to check her temperature and pulse oximetry daily. If she becomes sob, or if her O2 sat is < 95%, she is to call our office immediately for further instruction.\par Pt is advised to self quarantine until Covid-19 PCR test has resulted.\par \par

## 2021-11-20 NOTE — PHYSICAL EXAM
[No Acute Distress] : no acute distress [No Respiratory Distress] : no respiratory distress  [Normal] : affect was normal and insight and judgment were intact [de-identified] : l

## 2021-11-20 NOTE — PLAN
[FreeTextEntry1] : Start Time: 3:20 pm\par End Time: 3:45 pm\par Non-face-to-face time includes chart review prior to initiation of visit, and post visit processing of orders, and prescriptions.\par \par

## 2021-11-21 ENCOUNTER — TRANSCRIPTION ENCOUNTER (OUTPATIENT)
Age: 56
End: 2021-11-21

## 2021-11-21 LAB — SARS-COV-2 N GENE NPH QL NAA+PROBE: NOT DETECTED

## 2021-11-22 ENCOUNTER — TRANSCRIPTION ENCOUNTER (OUTPATIENT)
Age: 56
End: 2021-11-22

## 2021-12-01 ENCOUNTER — APPOINTMENT (OUTPATIENT)
Dept: FAMILY MEDICINE | Facility: CLINIC | Age: 56
End: 2021-12-01
Payer: COMMERCIAL

## 2021-12-01 ENCOUNTER — APPOINTMENT (OUTPATIENT)
Dept: BARIATRICS/WEIGHT MGMT | Facility: CLINIC | Age: 56
End: 2021-12-01
Payer: COMMERCIAL

## 2021-12-01 VITALS
RESPIRATION RATE: 16 BRPM | HEIGHT: 64 IN | BODY MASS INDEX: 50.02 KG/M2 | WEIGHT: 293 LBS | TEMPERATURE: 97.2 F | HEART RATE: 89 BPM | OXYGEN SATURATION: 98 %

## 2021-12-01 VITALS
RESPIRATION RATE: 16 BRPM | SYSTOLIC BLOOD PRESSURE: 129 MMHG | HEART RATE: 95 BPM | DIASTOLIC BLOOD PRESSURE: 82 MMHG | HEIGHT: 64 IN | OXYGEN SATURATION: 96 % | BODY MASS INDEX: 50.02 KG/M2 | WEIGHT: 293 LBS | TEMPERATURE: 97.2 F

## 2021-12-01 DIAGNOSIS — Z87.09 PERSONAL HISTORY OF OTHER DISEASES OF THE RESPIRATORY SYSTEM: ICD-10-CM

## 2021-12-01 DIAGNOSIS — B96.89 PERSONAL HISTORY OF OTHER DISEASES OF THE RESPIRATORY SYSTEM: ICD-10-CM

## 2021-12-01 PROCEDURE — 99213 OFFICE O/P EST LOW 20 MIN: CPT

## 2021-12-01 PROCEDURE — 99214 OFFICE O/P EST MOD 30 MIN: CPT

## 2021-12-01 RX ORDER — AMOXICILLIN AND CLAVULANATE POTASSIUM 875; 125 MG/1; MG/1
875-125 TABLET, COATED ORAL
Qty: 20 | Refills: 0 | Status: DISCONTINUED | COMMUNITY
Start: 2021-09-01 | End: 2021-12-01

## 2021-12-01 RX ORDER — AMOXICILLIN AND CLAVULANATE POTASSIUM 875; 125 MG/1; MG/1
875-125 TABLET, COATED ORAL
Qty: 14 | Refills: 0 | Status: DISCONTINUED | COMMUNITY
Start: 2021-10-09 | End: 2021-12-01

## 2021-12-01 NOTE — HISTORY OF PRESENT ILLNESS
[FreeTextEntry1] : Pt c/o persistent anxiety, worse x 1 month.\par Increased stress as a private .\par Adequate relief with alprazolam prn.\par Self d/jalen buspirone 10 mg bid. Felt it was not helping.\par Still with chronic insomnia.\par Seeing Obesity Medicine specialist. Lost 15 lbs over the past 2 months.\par Diet improved as well as habits surrounding eating. Avoids eating late at night, more time on food prep, less distracted eating times.\par

## 2021-12-01 NOTE — ASSESSMENT
[FreeTextEntry1] : Continue alprazolam.\par Re-start Buspirone at higher dose. Lower dose subtherapeutic\par Continue with diet modification and Obesity Medicine f/u.\par Cholesterol suboptimal. Hopefully will improve on current dietary modifications.\par Repeat lipids/LFTs in 3 months.\par F/U 1 month.

## 2021-12-01 NOTE — PHYSICAL EXAM
[No Acute Distress] : no acute distress [Thyroid Normal, No Nodules] : the thyroid was normal and there were no nodules present [No Respiratory Distress] : no respiratory distress  [Clear to Auscultation] : lungs were clear to auscultation bilaterally [Normal] : affect was normal and insight and judgment were intact

## 2021-12-01 NOTE — HISTORY OF PRESENT ILLNESS
[FreeTextEntry1] : Here for follow up , down about 5 lbs since the last visit ( 15 lbs since her first visit) clearly feeling better , more energy, less winded with her ( 2x per week for 1 hour) and  on her own for an hour increased  if stressed close to daily , walks daily with her dog \par \par Sleep \par 4-5 hours \par \par Breakfast: \par does not eat\par \par Lunch \par salad with walnuts   no dressing , cheese, L and T \par \par snack \par apple \par \par dinner 6: 30 pm\par veggie burger with no bread\par cauliflower steamed \par water\par apple\par \par exercising about one hour daily \par

## 2021-12-01 NOTE — REVIEW OF SYSTEMS
[Insomnia] : insomnia [Anxiety] : anxiety [Depression] : depression [Negative] : Neurological [FreeTextEntry9] : Occ right knee pain s/p right knee replacement
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
## 2022-01-03 ENCOUNTER — APPOINTMENT (OUTPATIENT)
Dept: FAMILY MEDICINE | Facility: CLINIC | Age: 57
End: 2022-01-03
Payer: COMMERCIAL

## 2022-01-03 VITALS — DIASTOLIC BLOOD PRESSURE: 78 MMHG | SYSTOLIC BLOOD PRESSURE: 118 MMHG

## 2022-01-03 VITALS
HEIGHT: 64 IN | DIASTOLIC BLOOD PRESSURE: 100 MMHG | SYSTOLIC BLOOD PRESSURE: 150 MMHG | HEART RATE: 87 BPM | OXYGEN SATURATION: 98 % | RESPIRATION RATE: 16 BRPM | BODY MASS INDEX: 50.02 KG/M2 | WEIGHT: 293 LBS

## 2022-01-03 DIAGNOSIS — B37.2 CANDIDIASIS OF SKIN AND NAIL: ICD-10-CM

## 2022-01-03 DIAGNOSIS — Z20.822 CONTACT WITH AND (SUSPECTED) EXPOSURE TO COVID-19: ICD-10-CM

## 2022-01-03 DIAGNOSIS — Z12.4 ENCOUNTER FOR SCREENING FOR MALIGNANT NEOPLASM OF CERVIX: ICD-10-CM

## 2022-01-03 PROCEDURE — 99214 OFFICE O/P EST MOD 30 MIN: CPT

## 2022-01-03 RX ORDER — BUSPIRONE HYDROCHLORIDE 15 MG/1
15 TABLET ORAL 3 TIMES DAILY
Qty: 270 | Refills: 0 | Status: DISCONTINUED | COMMUNITY
Start: 2021-08-05 | End: 2022-01-03

## 2022-01-03 RX ORDER — DOXYCYCLINE HYCLATE 100 MG/1
100 CAPSULE ORAL
Qty: 20 | Refills: 0 | Status: DISCONTINUED | COMMUNITY
Start: 2021-11-18 | End: 2022-01-03

## 2022-01-04 NOTE — ASSESSMENT
[FreeTextEntry1] : BP elevated initially upon arrival to office and repeat bp normal.\par Cont with wt loss efforts.\par D/C Buspirone, lack of efficacy. Take 1 tab twice daily for 3 days, then 1 tab once daily for 3 days, then stop.\par Consider SSRI. Will re-evaluate at f/u.\par F/U 1 month.

## 2022-01-04 NOTE — HISTORY OF PRESENT ILLNESS
[FreeTextEntry1] : Patient is here to follow up on insomnia and anxiety. \par Patient had buspirone increased from 10 mg twice daily to 15 mg 3 times daily last month for treatment of her anxiety. She was not feeling much any significant relief of her anxiety on the 10 mg bid dose of the buspirone. She felt no improvement whatsoever on the higher dose of Buspirone either. \par She is working with her obesity medicine dr and made progress, but had a little setback over the holiday and gained 6 lbs over the past month. She feels motivated to get back on track with her positive lifestyle changes.\par Has chronic low back pain. Robaxin helps for low back spasm relief.\par Insomnia is chronic, stable.\par

## 2022-01-04 NOTE — PHYSICAL EXAM
[No Acute Distress] : no acute distress [Thyroid Normal, No Nodules] : the thyroid was normal and there were no nodules present [No Respiratory Distress] : no respiratory distress  [Clear to Auscultation] : lungs were clear to auscultation bilaterally [Normal] : affect was normal and insight and judgment were intact [de-identified] : lymphedema b/l LEs.

## 2022-01-05 ENCOUNTER — APPOINTMENT (OUTPATIENT)
Dept: BARIATRICS/WEIGHT MGMT | Facility: CLINIC | Age: 57
End: 2022-01-05

## 2022-01-10 ENCOUNTER — APPOINTMENT (OUTPATIENT)
Dept: BARIATRICS/WEIGHT MGMT | Facility: CLINIC | Age: 57
End: 2022-01-10
Payer: COMMERCIAL

## 2022-01-10 VITALS — WEIGHT: 293 LBS | BODY MASS INDEX: 54.93 KG/M2

## 2022-01-10 PROCEDURE — 99213 OFFICE O/P EST LOW 20 MIN: CPT | Mod: 95

## 2022-01-10 RX ORDER — BENZONATATE 100 MG/1
100 CAPSULE ORAL
Qty: 30 | Refills: 0 | Status: DISCONTINUED | COMMUNITY
Start: 2021-11-02 | End: 2022-01-10

## 2022-01-10 NOTE — REASON FOR VISIT
[Other Location: e.g. School (Enter Location, City,State)___] : at [unfilled], at the time of the visit. [Other Location: e.g. Home (Enter Location, City,State)___] : at [unfilled] [Verbal consent obtained from patient] : the patient, [unfilled] [FreeTextEntry2] : here for  follow up for wt management
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
## 2022-01-10 NOTE — HISTORY OF PRESENT ILLNESS
[FreeTextEntry1] : Here for follow up , doing well, gained 5 lbs, stopped training due to the fact that her  dev covid and she is not vaccinated ( as she had a anaphylactic reaction  to the flu in the past) \par \par Breakfast: \par none\par \par Lunch\par oatmeal plain : banana and strawberries and kemar\par \par snack \par apples , pears, orange \par \par dinner : 6 pm \par wrap with veg and lisa \par or\par chicken noodle soup\par chili\par \par no dessert \par no snack \par \par exer: \par trainig: 3x week for 1 hour\par \par sleep \par doing better , 5-6 hours\par and she feels better\par

## 2022-01-26 ENCOUNTER — APPOINTMENT (OUTPATIENT)
Dept: FAMILY MEDICINE | Facility: CLINIC | Age: 57
End: 2022-01-26
Payer: COMMERCIAL

## 2022-01-26 ENCOUNTER — APPOINTMENT (OUTPATIENT)
Dept: ULTRASOUND IMAGING | Facility: CLINIC | Age: 57
End: 2022-01-26
Payer: COMMERCIAL

## 2022-01-26 ENCOUNTER — OUTPATIENT (OUTPATIENT)
Dept: OUTPATIENT SERVICES | Facility: HOSPITAL | Age: 57
LOS: 1 days | End: 2022-01-26
Payer: COMMERCIAL

## 2022-01-26 VITALS — HEART RATE: 102 BPM | RESPIRATION RATE: 16 BRPM | OXYGEN SATURATION: 98 %

## 2022-01-26 DIAGNOSIS — R25.2 CRAMP AND SPASM: ICD-10-CM

## 2022-01-26 DIAGNOSIS — Z98.89 OTHER SPECIFIED POSTPROCEDURAL STATES: Chronic | ICD-10-CM

## 2022-01-26 LAB
BILIRUB UR QL STRIP: NEGATIVE
CLARITY UR: CLEAR
COLLECTION METHOD: NORMAL
GLUCOSE UR-MCNC: NEGATIVE
HCG UR QL: 0.2 EU/DL
HGB UR QL STRIP.AUTO: NEGATIVE
KETONES UR-MCNC: NEGATIVE
LEUKOCYTE ESTERASE UR QL STRIP: NEGATIVE
NITRITE UR QL STRIP: NEGATIVE
PH UR STRIP: 5.5
PROT UR STRIP-MCNC: NEGATIVE
SP GR UR STRIP: 1.03

## 2022-01-26 PROCEDURE — 93971 EXTREMITY STUDY: CPT | Mod: 26,RT

## 2022-01-26 PROCEDURE — 93971 EXTREMITY STUDY: CPT

## 2022-01-26 PROCEDURE — 81003 URINALYSIS AUTO W/O SCOPE: CPT | Mod: QW

## 2022-01-26 PROCEDURE — 99214 OFFICE O/P EST MOD 30 MIN: CPT | Mod: 25

## 2022-01-26 NOTE — HISTORY OF PRESENT ILLNESS
[FreeTextEntry1] : Patient c/o right knee pain and intense leg cramps x 3 months, worse this am.\par Pain is worse behind the right knee.\par Pt denies any numbness or tingling in leg. Pt denies any discoloration or swelling. \par She is s/p right TKR in 3/2021.\par c/o urine frequency x several days. No dysuria.\par Anxiety controlled on current regimen.\par Pt c/o nausea on 40 mg of Omeprazole.

## 2022-01-26 NOTE — PHYSICAL EXAM
[No Acute Distress] : no acute distress [Thyroid Normal, No Nodules] : the thyroid was normal and there were no nodules present [No Respiratory Distress] : no respiratory distress  [Clear to Auscultation] : lungs were clear to auscultation bilaterally [2+] : left 2+ [Normal] : affect was normal and insight and judgment were intact [de-identified] : lymphedema b/l LEs. [de-identified] : Calves nontender. Right popliteal fossa tender to palpation. [de-identified] : No erythema of LEs.

## 2022-01-26 NOTE — ASSESSMENT
[FreeTextEntry1] : Stat doppler RLE r/o DVT.\par Warm compresses RLE, popliteal region and lower leg.\par Tylenol or Tramadol prn. Tramadol not to be taken on same day with alprazolam or with Zolpidem.\par r/o UTI. Await cx.\par Decrease Omeprazole from 40 mg/d to 20 mg/d due to nausea on 40 mg/d dose.\par Monitor for persisting or worsening nausea on the 20 mg/d dose.\par F/U 1 month.

## 2022-02-01 ENCOUNTER — TRANSCRIPTION ENCOUNTER (OUTPATIENT)
Age: 57
End: 2022-02-01

## 2022-02-01 LAB — BACTERIA UR CULT: NORMAL

## 2022-02-02 ENCOUNTER — APPOINTMENT (OUTPATIENT)
Dept: FAMILY MEDICINE | Facility: CLINIC | Age: 57
End: 2022-02-02

## 2022-02-02 ENCOUNTER — APPOINTMENT (OUTPATIENT)
Dept: BARIATRICS/WEIGHT MGMT | Facility: CLINIC | Age: 57
End: 2022-02-02
Payer: COMMERCIAL

## 2022-02-02 VITALS
TEMPERATURE: 97.3 F | BODY MASS INDEX: 50.02 KG/M2 | RESPIRATION RATE: 16 BRPM | HEIGHT: 64 IN | WEIGHT: 293 LBS | HEART RATE: 80 BPM | SYSTOLIC BLOOD PRESSURE: 104 MMHG | OXYGEN SATURATION: 97 % | DIASTOLIC BLOOD PRESSURE: 67 MMHG

## 2022-02-02 PROCEDURE — 99213 OFFICE O/P EST LOW 20 MIN: CPT
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
## 2022-02-02 NOTE — HISTORY OF PRESENT ILLNESS
[FreeTextEntry1] : here for follow up for wt management \par \par Breakfast: \par at 11 \par banana \par \par lunch 1 pm\par chicken breast \par 647\par provolone \par pickles \par \par snack\par apples or oranges \par \par dinner \par 6 pm\par gorgonzola\par veg\par lower sugar craisins\par \par \par exer\par  had covid\par now on her own : box 20 min\par stretching 10 min 2x day \par med ball\par sleep \par better : lunar new year

## 2022-02-28 ENCOUNTER — RX RENEWAL (OUTPATIENT)
Age: 57
End: 2022-02-28

## 2022-03-02 ENCOUNTER — APPOINTMENT (OUTPATIENT)
Dept: FAMILY MEDICINE | Facility: CLINIC | Age: 57
End: 2022-03-02
Payer: COMMERCIAL

## 2022-03-02 VITALS
HEIGHT: 64 IN | HEART RATE: 90 BPM | SYSTOLIC BLOOD PRESSURE: 132 MMHG | BODY MASS INDEX: 50.02 KG/M2 | DIASTOLIC BLOOD PRESSURE: 87 MMHG | WEIGHT: 293 LBS | OXYGEN SATURATION: 97 %

## 2022-03-02 PROCEDURE — 99214 OFFICE O/P EST MOD 30 MIN: CPT

## 2022-03-02 RX ORDER — OMEPRAZOLE 20 MG/1
20 CAPSULE, DELAYED RELEASE ORAL
Qty: 90 | Refills: 0 | Status: DISCONTINUED | COMMUNITY
Start: 2020-07-17 | End: 2022-03-02

## 2022-03-02 RX ORDER — TRAMADOL HYDROCHLORIDE 50 MG/1
50 TABLET, COATED ORAL
Qty: 30 | Refills: 0 | Status: DISCONTINUED | COMMUNITY
Start: 2022-01-26 | End: 2022-03-02

## 2022-03-02 NOTE — ASSESSMENT
[FreeTextEntry1] : DC omeprazole 20 mg secondary to lack of efficacy.\par Start pantoprazole 40 mg 1 tab per day.\par Add famotidine 20 mg once daily as needed breakthrough only.\par Follow-up with gastroenterology due to chronic worsening GERD symptoms and for surveillance of pancreatic cyst.\par Patient cautioned not to take tramadol when she is taking alprazolam or zolpidem.\par She is advised to take Tylenol first-line for her right knee pain and to use tramadol as needed for breakthrough only.  Will avoid NSAIDs at this time due to worsening GERD symptoms.\par F/U 1 month.\par

## 2022-03-02 NOTE — HISTORY OF PRESENT ILLNESS
[FreeTextEntry1] : c/o worsening GERD.\par He is currently on omeprazole 20 mg once daily.  It was decreased from 40 mg once daily because patient felt that the omeprazole was making her nauseated.  Unfortunately, the reflux got worse on the lower dose.\par Patient complains of persistent increased right knee pain.  He is taking Tylenol but pain persists..  She had been on tramadol in the past which provided some relief.  Her pain was increased at the posterior aspect of the right knee last month and a venous ultrasound was done which was negative for DVT.  She has an appointment with her orthopedist tomorrow for further evaluation.  She is status post right TKR 1 year ago.\par Has more frequent episodes of reflux associated with nausea and had vomiting with it this am.\par Patient c/o persistent anxiety associated with panic disorder.\par She has chronic insomnia, relief with Zolpidem.\par Patient has a pancreatic cyst and is planning to follow-up with her gastroenterologist in 1 to 2 months for surveillance.\par She has been adjusting her diet for increased plant-based foods.  She is following with obesity medicine and making some progress.

## 2022-03-02 NOTE — PHYSICAL EXAM
[No Acute Distress] : no acute distress [Thyroid Normal, No Nodules] : the thyroid was normal and there were no nodules present [No Respiratory Distress] : no respiratory distress  [Clear to Auscultation] : lungs were clear to auscultation bilaterally [2+] : left 2+ [Normal] : affect was normal and insight and judgment were intact [de-identified] : lymphedema b/l LEs. [de-identified] : Calves nontender. Right proximal anterior tibial region and anterior knee mildly tender to palpation. [de-identified] : No erythema of LEs.

## 2022-03-04 ENCOUNTER — APPOINTMENT (OUTPATIENT)
Dept: BARIATRICS/WEIGHT MGMT | Facility: CLINIC | Age: 57
End: 2022-03-04
Payer: COMMERCIAL

## 2022-03-04 VITALS — BODY MASS INDEX: 53.55 KG/M2 | WEIGHT: 293 LBS

## 2022-03-04 PROCEDURE — 99213 OFFICE O/P EST LOW 20 MIN: CPT | Mod: 95

## 2022-03-04 NOTE — HISTORY OF PRESENT ILLNESS
[FreeTextEntry1] : Here for follow up for wt management . Slipped on the ice and hurt her leg\par \par Breakfast: \par none\par \par Lunch: \par oatmeal with strawberry and banana \par \par snack \par apple\par \par dinner\par chicken wrap: wheat \par cooked vegetables : broccoli or asparagus \par vegetables : carrots , celery and raisins\par \par exercise: none as she got hurt\par \par constipation has resolved\par \par sleep\par has been very busy and working 12-13 hours and sleeping less\par \par
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
## 2022-03-04 NOTE — REASON FOR VISIT
[Other Location: e.g. School (Enter Location, City,State)___] : at [unfilled], at the time of the visit. [Other Location: e.g. Home (Enter Location, City,State)___] : at [unfilled] [Verbal consent obtained from patient] : the patient, [unfilled] [Follow-Up Visit] : a follow-up visit for [FreeTextEntry2] : Here for follow up  for wt management

## 2022-03-08 ENCOUNTER — TRANSCRIPTION ENCOUNTER (OUTPATIENT)
Age: 57
End: 2022-03-08

## 2022-03-08 LAB
ANION GAP SERPL CALC-SCNC: 13 MMOL/L
APPEARANCE: CLEAR
BACTERIA UR CULT: NORMAL
BACTERIA: NEGATIVE
BILIRUBIN URINE: NEGATIVE
BLOOD URINE: NEGATIVE
BUN SERPL-MCNC: 16 MG/DL
CALCIUM OXALATE CRYSTALS: ABNORMAL
CALCIUM SERPL-MCNC: 9.7 MG/DL
CHLORIDE SERPL-SCNC: 105 MMOL/L
CO2 SERPL-SCNC: 24 MMOL/L
COLOR: NORMAL
CREAT SERPL-MCNC: 0.69 MG/DL
EGFR: 101 ML/MIN/1.73M2
GLUCOSE QUALITATIVE U: NEGATIVE
GLUCOSE SERPL-MCNC: 92 MG/DL
HYALINE CASTS: 2 /LPF
KETONES URINE: NEGATIVE
LEUKOCYTE ESTERASE URINE: NEGATIVE
MICROSCOPIC-UA: NORMAL
NITRITE URINE: NEGATIVE
PH URINE: 5.5
POTASSIUM SERPL-SCNC: 4.4 MMOL/L
PROTEIN URINE: NEGATIVE
RED BLOOD CELLS URINE: 3 /HPF
SODIUM SERPL-SCNC: 142 MMOL/L
SPECIFIC GRAVITY URINE: 1.02
SQUAMOUS EPITHELIAL CELLS: 2 /HPF
TSH SERPL-ACNC: 2.19 UIU/ML
UROBILINOGEN URINE: NORMAL
VIT B12 SERPL-MCNC: 1019 PG/ML
WHITE BLOOD CELLS URINE: 2 /HPF

## 2022-04-06 ENCOUNTER — APPOINTMENT (OUTPATIENT)
Dept: FAMILY MEDICINE | Facility: CLINIC | Age: 57
End: 2022-04-06
Payer: COMMERCIAL

## 2022-04-06 ENCOUNTER — APPOINTMENT (OUTPATIENT)
Dept: BARIATRICS/WEIGHT MGMT | Facility: CLINIC | Age: 57
End: 2022-04-06
Payer: COMMERCIAL

## 2022-04-06 VITALS
HEART RATE: 85 BPM | TEMPERATURE: 97.5 F | WEIGHT: 293 LBS | DIASTOLIC BLOOD PRESSURE: 73 MMHG | RESPIRATION RATE: 16 BRPM | BODY MASS INDEX: 50.02 KG/M2 | SYSTOLIC BLOOD PRESSURE: 109 MMHG | OXYGEN SATURATION: 97 % | HEIGHT: 64 IN

## 2022-04-06 VITALS
BODY MASS INDEX: 50.02 KG/M2 | HEIGHT: 64 IN | OXYGEN SATURATION: 98 % | WEIGHT: 293 LBS | DIASTOLIC BLOOD PRESSURE: 90 MMHG | RESPIRATION RATE: 16 BRPM | SYSTOLIC BLOOD PRESSURE: 120 MMHG | HEART RATE: 72 BPM

## 2022-04-06 DIAGNOSIS — M25.561 PAIN IN RIGHT KNEE: ICD-10-CM

## 2022-04-06 DIAGNOSIS — K44.9 DIAPHRAGMATIC HERNIA W/OUT OBSTRUCTION OR GANGRENE: ICD-10-CM

## 2022-04-06 DIAGNOSIS — H92.02 OTALGIA, LEFT EAR: ICD-10-CM

## 2022-04-06 PROCEDURE — 99214 OFFICE O/P EST MOD 30 MIN: CPT

## 2022-04-06 PROCEDURE — 99213 OFFICE O/P EST LOW 20 MIN: CPT
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
## 2022-04-06 NOTE — ASSESSMENT
[FreeTextEntry1] : Pt has appt with her ENT tomorrow.\par Left TM mildly  erythematous. She is to address with ENT.\par Pt to see orthopedist regarding right knee constant pain.\par Tramadol sparingly prn pain and not to be used same day as alprazolam or within 8 hours of sleep aid.\par Pt aware.\par F/U 1 month.

## 2022-04-06 NOTE — PHYSICAL EXAM
[No Acute Distress] : no acute distress [Thyroid Normal, No Nodules] : the thyroid was normal and there were no nodules present [No Respiratory Distress] : no respiratory distress  [Clear to Auscultation] : lungs were clear to auscultation bilaterally [2+] : left 2+ [Normal] : affect was normal and insight and judgment were intact [de-identified] : lymphedema b/l LEs. [de-identified] : Left TM mild erythema with small area of chronic change. [de-identified] : Calves nontender. Right proximal tibial region is mildly sore. [de-identified] : No erythema of LEs.

## 2022-04-06 NOTE — HISTORY OF PRESENT ILLNESS
[FreeTextEntry1] : here for follow up , not struggling with her  wt,\par \par Breakfast:\par nothing \par \par Lunch \par salad with protein\par or veggie wrap \par 2 pieces of fruit \par \par \par snack\par +/-\par nuts\par \par dinner \par baked chicken\par seafood\par cooked vegetables\par dessert : fruit \par \par no longer stress eating any longer \par \par exercise: \par \par PT \par \par sleep\par better not as many 3 am calls \par \par has more energy\par
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
## 2022-05-02 ENCOUNTER — RX RENEWAL (OUTPATIENT)
Age: 57
End: 2022-05-02

## 2022-05-02 ENCOUNTER — APPOINTMENT (OUTPATIENT)
Dept: FAMILY MEDICINE | Facility: CLINIC | Age: 57
End: 2022-05-02
Payer: COMMERCIAL

## 2022-05-02 PROCEDURE — 99214 OFFICE O/P EST MOD 30 MIN: CPT | Mod: 95

## 2022-05-03 NOTE — HISTORY OF PRESENT ILLNESS
[Home] : at home, [unfilled] , at the time of the visit. [Medical Office: ([facility])___] : at the medical office located in  [Verbal consent obtained from patient] : the patient, [unfilled] [FreeTextEntry1] : Patient c/o persistent insomnia and seasonal allergies.\par She was working outdoors yesterday and woke up this am with mild swelling of the face around the maxillary region as well as increased nasal congestion. No fever or cough.\par She has seen Dr. Wood for ENT and would like to discuss alternative to Flonase, which makes her nauseous. She notes a fragrance with the Flonase that she finds disturbing and feels it is causing the nausea.\par She also c/o right foot swelling following suspected spider bite x 2 days.\par She has focal redness and swelling but it is improving. No fever.\par Pt c/o chronic pain of the right knee s/p TKR. She has recently followed with her orthopedist for evaluation. Right knee pain seems to be improving but is severe at times especially after prolonged standing / walking. She has had good relief with Tramadol prn.\par Pt has chronic anxiety with panic attacks which has been stable on current therapy.

## 2022-05-03 NOTE — REVIEW OF SYSTEMS
[Cough] : no cough [Joint Pain] : joint pain [Anxiety] : anxiety [FreeTextEntry4] : nasal congestion, facial swelling

## 2022-05-03 NOTE — ASSESSMENT
[FreeTextEntry1] : Change Flonase to Nasonex due to fragrance side effect of Flonase.\par Renew Tramadol for chronic right knee pain which is helping to restore function.\par Reviewed with pt that Tramadol to be limited for breakthrough pain only and to be weaned off.\par Continue with Tylenol for first line therapy as needed for pain, Naprosyn for added pain relief and Tramadol for breakthrough only. Pt cautioned not take this medication when taking alprazolam or zolpidem. \par Continue with increased exercise as tolerated and efforts toward weight optimization.

## 2022-05-04 ENCOUNTER — APPOINTMENT (OUTPATIENT)
Dept: BARIATRICS/WEIGHT MGMT | Facility: CLINIC | Age: 57
End: 2022-05-04

## 2022-06-01 ENCOUNTER — APPOINTMENT (OUTPATIENT)
Dept: FAMILY MEDICINE | Facility: CLINIC | Age: 57
End: 2022-06-01
Payer: COMMERCIAL

## 2022-06-01 ENCOUNTER — APPOINTMENT (OUTPATIENT)
Dept: BARIATRICS/WEIGHT MGMT | Facility: CLINIC | Age: 57
End: 2022-06-01
Payer: COMMERCIAL

## 2022-06-01 VITALS
TEMPERATURE: 97.4 F | BODY MASS INDEX: 50.02 KG/M2 | WEIGHT: 293 LBS | HEART RATE: 90 BPM | HEIGHT: 64 IN | OXYGEN SATURATION: 98 %

## 2022-06-01 VITALS
TEMPERATURE: 97.8 F | DIASTOLIC BLOOD PRESSURE: 85 MMHG | HEIGHT: 64 IN | HEART RATE: 98 BPM | OXYGEN SATURATION: 94 % | BODY MASS INDEX: 50.02 KG/M2 | WEIGHT: 293 LBS | SYSTOLIC BLOOD PRESSURE: 136 MMHG

## 2022-06-01 DIAGNOSIS — M71.21 SYNOVIAL CYST OF POPLITEAL SPACE [BAKER], RIGHT KNEE: ICD-10-CM

## 2022-06-01 DIAGNOSIS — Z96.651 PRESENCE OF RIGHT ARTIFICIAL KNEE JOINT: ICD-10-CM

## 2022-06-01 PROCEDURE — 99214 OFFICE O/P EST MOD 30 MIN: CPT

## 2022-06-01 PROCEDURE — 99213 OFFICE O/P EST LOW 20 MIN: CPT

## 2022-06-01 NOTE — REASON FOR VISIT
[Follow-Up Visit] : a follow-up visit for [Obesity] : obesity [FreeTextEntry2] : here for follow up for wt management

## 2022-06-01 NOTE — REVIEW OF SYSTEMS
[Joint Pain] : joint pain [Anxiety] : anxiety [Negative] : Neurological [Shortness Of Breath] : no shortness of breath [Dyspnea on Exertion] : no dyspnea on exertion

## 2022-06-01 NOTE — PHYSICAL EXAM
[No Acute Distress] : no acute distress [No Respiratory Distress] : no respiratory distress  [Clear to Auscultation] : lungs were clear to auscultation bilaterally [2+] : left 2+ [Coordination Grossly Intact] : coordination grossly intact [No Focal Deficits] : no focal deficits [Normal] : affect was normal and insight and judgment were intact [de-identified] : lymphedema of LEs b/l.  [de-identified] : Mild tenderness posterior right knee. + popliteal cyst post right knee.

## 2022-06-01 NOTE — PLAN
[FreeTextEntry1] : Pt encounter incorporated clinical review of the medical record including review of lab and diagnostic testing with interpretation and discussion of results with patient, general pt counseling, and coordination of care, as well as documentation update within the electronic medical record.\par \par Time spent: 30 mins.\par

## 2022-06-01 NOTE — ASSESSMENT
[FreeTextEntry1] : Re-initiate use of compression socks b/l daily to help with LE fluid retention.\par Try Diclofenac ER for chronic right knee pain.\par D/c Meloxicam\par Low sodium diet, continued adherence.\par F/U 1 month.

## 2022-06-01 NOTE — HISTORY OF PRESENT ILLNESS
[FreeTextEntry1] : Here for follow up, up 5 lbs and had a diff time due to issues with the death of her nephew and work, has been working out 3x week for 60 min  and exer inbetween as well. \par \par Breakfast\par does not eat \par \par Lunch \par asai bowl: with fruit \par \par snack \par none \par \par dinner: \par none \par \par too hot to eat yesterday
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
## 2022-06-15 ENCOUNTER — RX RENEWAL (OUTPATIENT)
Age: 57
End: 2022-06-15

## 2022-06-23 DIAGNOSIS — D13.1 BENIGN NEOPLASM OF STOMACH: ICD-10-CM

## 2022-07-01 ENCOUNTER — APPOINTMENT (OUTPATIENT)
Dept: FAMILY MEDICINE | Facility: CLINIC | Age: 57
End: 2022-07-01

## 2022-07-01 VITALS
BODY MASS INDEX: 50.02 KG/M2 | HEIGHT: 64 IN | WEIGHT: 293 LBS | HEART RATE: 77 BPM | TEMPERATURE: 97 F | OXYGEN SATURATION: 98 % | SYSTOLIC BLOOD PRESSURE: 134 MMHG | DIASTOLIC BLOOD PRESSURE: 88 MMHG

## 2022-07-01 DIAGNOSIS — Z01.818 ENCOUNTER FOR OTHER PREPROCEDURAL EXAMINATION: ICD-10-CM

## 2022-07-01 DIAGNOSIS — Z20.822 CONTACT WITH AND (SUSPECTED) EXPOSURE TO COVID-19: ICD-10-CM

## 2022-07-01 DIAGNOSIS — Z87.828 PERSONAL HISTORY OF OTHER (HEALED) PHYSICAL INJURY AND TRAUMA: ICD-10-CM

## 2022-07-01 DIAGNOSIS — I89.0 LYMPHEDEMA, NOT ELSEWHERE CLASSIFIED: ICD-10-CM

## 2022-07-01 DIAGNOSIS — S90.861A INSECT BITE (NONVENOMOUS), RIGHT FOOT, INITIAL ENCOUNTER: ICD-10-CM
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
## 2022-07-01 PROCEDURE — 99214 OFFICE O/P EST MOD 30 MIN: CPT

## 2022-07-01 NOTE — PHYSICAL EXAM
[No Respiratory Distress] : no respiratory distress  [No Acute Distress] : no acute distress [Clear to Auscultation] : lungs were clear to auscultation bilaterally [2+] : left 2+ [Coordination Grossly Intact] : coordination grossly intact [No Focal Deficits] : no focal deficits [Normal] : affect was normal and insight and judgment were intact [de-identified] : Lymphedema of LEs b/l. Edema of LEs R>L including right foot [de-identified] : Mild tenderness posterior right knee. + popliteal cyst post right knee. Mild tenderness dorsum right foot.

## 2022-07-01 NOTE — HISTORY OF PRESENT ILLNESS
[FreeTextEntry1] : Patient is following up on anxiety and insomnia.\par Pt had upper endoscopy 1 week ago for surveillance of pancreatic cyst.\par She is having difficulty swallowing hard food since her upper endoscopy. It seems to be improved.\par Pt c/o right foot swelling and pain at the dorsum since her right knee replacement 1 year ago.\par She has chronic RLE edema since the right knee replacement and prior venous doppler neg for DVT.\par She is due for blood work.  She plans to go soon.\par Anxiety controlled on current medications.\par \par She had a polyp removed from her stomach.\par

## 2022-07-05 ENCOUNTER — APPOINTMENT (OUTPATIENT)
Dept: FAMILY MEDICINE | Facility: CLINIC | Age: 57
End: 2022-07-05

## 2022-07-05 PROBLEM — D13.1 FUNDIC GLAND POLYPS OF STOMACH, BENIGN: Status: ACTIVE | Noted: 2022-07-05

## 2022-07-06 ENCOUNTER — APPOINTMENT (OUTPATIENT)
Dept: BARIATRICS/WEIGHT MGMT | Facility: CLINIC | Age: 57
End: 2022-07-06

## 2022-07-06 ENCOUNTER — APPOINTMENT (OUTPATIENT)
Dept: FAMILY MEDICINE | Facility: CLINIC | Age: 57
End: 2022-07-06

## 2022-07-06 VITALS
OXYGEN SATURATION: 97 % | BODY MASS INDEX: 50.02 KG/M2 | HEIGHT: 64 IN | TEMPERATURE: 97 F | DIASTOLIC BLOOD PRESSURE: 82 MMHG | HEART RATE: 78 BPM | RESPIRATION RATE: 14 BRPM | SYSTOLIC BLOOD PRESSURE: 134 MMHG | WEIGHT: 293 LBS

## 2022-07-06 VITALS
BODY MASS INDEX: 50.02 KG/M2 | HEART RATE: 72 BPM | OXYGEN SATURATION: 97 % | RESPIRATION RATE: 16 BRPM | WEIGHT: 293 LBS | SYSTOLIC BLOOD PRESSURE: 130 MMHG | HEIGHT: 64 IN | DIASTOLIC BLOOD PRESSURE: 80 MMHG

## 2022-07-06 DIAGNOSIS — R60.0 LOCALIZED EDEMA: ICD-10-CM

## 2022-07-06 DIAGNOSIS — R20.2 ANESTHESIA OF SKIN: ICD-10-CM

## 2022-07-06 DIAGNOSIS — R20.0 ANESTHESIA OF SKIN: ICD-10-CM

## 2022-07-06 DIAGNOSIS — M79.671 PAIN IN RIGHT FOOT: ICD-10-CM

## 2022-07-06 PROCEDURE — 99213 OFFICE O/P EST LOW 20 MIN: CPT

## 2022-07-06 NOTE — PHYSICAL EXAM
[Normal] : normal sclera/conjunctiva, pupils are equal, round and reactive to light and extraocular movements are intact [Normal Outer Ear/Nose] : the outer ears and nose were normal in appearance [Supple] : supple [No Rash] : no rash [Coordination Grossly Intact] : coordination grossly intact [No Focal Deficits] : no focal deficits [Normal Affect] : the affect was normal [Normal Insight/Judgement] : insight and judgment were intact [de-identified] : R foot non-pitting edema [de-identified] : Right Ankle swelling [de-identified] : antalgic gait

## 2022-07-06 NOTE — HISTORY OF PRESENT ILLNESS
[FreeTextEntry1] : Patient is following up on worsening right foot pain and swelling x 1 week. Pt claims she gets episodes of numbness in foot as well [de-identified] : Ms. ADMION ALVARADO is a 57 year female in office with c/o of worsening right foot swelling associated with pain, numbness and tingling. Patient has had ongoing right foot swelling for the past 6-7 months, with constant pain, worsening with ambulation. At start of symptoms, patient had US Doppler done to evaluate for RLE DVT, which was negative. No antecedent injury. She has tried using compression socks and the swelling does not improve, and the last time she tried it on, she had to cut of the socks due to sever pain.

## 2022-07-06 NOTE — PHYSICAL EXAM
[Obese, well nourished, in no acute distress] : obese, well nourished, in no acute distress [Normal] : RRR, normal S1S2, no murmurs, rubs, gallops [de-identified] : + non pitting edema on the right foot , non on the left

## 2022-07-06 NOTE — PLAN
[FreeTextEntry1] : Evaluate foot for soft tissue abnormality that might explain etiology of unilateral right foot swelling.

## 2022-07-06 NOTE — HISTORY OF PRESENT ILLNESS
[FreeTextEntry1] : Here for follow up for wt management, having difficulty with her stomach, had an EGD  for a pancreatic cyst  and complained of post EGD dysphagia  and angry as no one has given her an answer . . Complains of swelling and pain  in her right foot only worse as the day goes on \par \par Breakfast: \par none \par \par Lunch \par greek salad without feta,\par \par snack \par protein drink \par banana \par \par dinner\par none\par salad\par plant based burger\par \par no eating late at night
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
## 2022-07-19 ENCOUNTER — APPOINTMENT (OUTPATIENT)
Dept: MRI IMAGING | Facility: CLINIC | Age: 57
End: 2022-07-19

## 2022-07-19 ENCOUNTER — RESULT REVIEW (OUTPATIENT)
Age: 57
End: 2022-07-19

## 2022-07-19 ENCOUNTER — APPOINTMENT (OUTPATIENT)
Dept: RADIOLOGY | Facility: CLINIC | Age: 57
End: 2022-07-19

## 2022-07-19 ENCOUNTER — OUTPATIENT (OUTPATIENT)
Dept: OUTPATIENT SERVICES | Facility: HOSPITAL | Age: 57
LOS: 1 days | End: 2022-07-19
Payer: COMMERCIAL

## 2022-07-19 DIAGNOSIS — Z98.89 OTHER SPECIFIED POSTPROCEDURAL STATES: Chronic | ICD-10-CM

## 2022-07-19 DIAGNOSIS — R60.0 LOCALIZED EDEMA: ICD-10-CM

## 2022-07-19 DIAGNOSIS — R20.0 ANESTHESIA OF SKIN: ICD-10-CM

## 2022-07-19 PROCEDURE — 71045 X-RAY EXAM CHEST 1 VIEW: CPT | Mod: 26

## 2022-07-19 PROCEDURE — 74018 RADEX ABDOMEN 1 VIEW: CPT | Mod: 26

## 2022-07-19 PROCEDURE — 71045 X-RAY EXAM CHEST 1 VIEW: CPT

## 2022-07-19 PROCEDURE — 73718 MRI LOWER EXTREMITY W/O DYE: CPT | Mod: 26,RT

## 2022-07-19 PROCEDURE — 73718 MRI LOWER EXTREMITY W/O DYE: CPT

## 2022-07-19 PROCEDURE — 74018 RADEX ABDOMEN 1 VIEW: CPT

## 2022-08-03 ENCOUNTER — APPOINTMENT (OUTPATIENT)
Dept: BARIATRICS/WEIGHT MGMT | Facility: CLINIC | Age: 57
End: 2022-08-03

## 2022-08-03 ENCOUNTER — APPOINTMENT (OUTPATIENT)
Dept: VASCULAR SURGERY | Facility: CLINIC | Age: 57
End: 2022-08-03

## 2022-08-03 ENCOUNTER — APPOINTMENT (OUTPATIENT)
Dept: FAMILY MEDICINE | Facility: CLINIC | Age: 57
End: 2022-08-03

## 2022-08-03 VITALS
WEIGHT: 293 LBS | OXYGEN SATURATION: 98 % | BODY MASS INDEX: 50.02 KG/M2 | DIASTOLIC BLOOD PRESSURE: 90 MMHG | HEIGHT: 64 IN | HEART RATE: 98 BPM | SYSTOLIC BLOOD PRESSURE: 140 MMHG

## 2022-08-03 VITALS
BODY MASS INDEX: 50.02 KG/M2 | HEART RATE: 86 BPM | OXYGEN SATURATION: 96 % | DIASTOLIC BLOOD PRESSURE: 83 MMHG | TEMPERATURE: 97.5 F | HEIGHT: 64 IN | RESPIRATION RATE: 14 BRPM | SYSTOLIC BLOOD PRESSURE: 139 MMHG | WEIGHT: 293 LBS

## 2022-08-03 DIAGNOSIS — G89.29 OTHER CHRONIC PAIN: ICD-10-CM

## 2022-08-03 DIAGNOSIS — Q45.3 OTHER CONGENITAL MALFORMATIONS OF PANCREAS AND PANCREATIC DUCT: ICD-10-CM

## 2022-08-03 PROCEDURE — 99214 OFFICE O/P EST MOD 30 MIN: CPT

## 2022-08-03 PROCEDURE — 99213 OFFICE O/P EST LOW 20 MIN: CPT

## 2022-08-03 NOTE — HISTORY OF PRESENT ILLNESS
[FreeTextEntry1] : Patient c/o right foot pain, chronic x several months.\par Right foot pain is worse with walking and with wt bearing. Pain is along the medial foot, the toes and lateral aspect of the foot.\par No known trauma. Saw podiatry and right foot is wrapped, feels more supportive.\par C/o epigastric discomfort after eating.\par Feels like food is caught in stomach after eating. Saw GI and had recent upper endoscopy. 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 Occ panic attacks. Adequate relief with current medications.

## 2022-08-03 NOTE — HISTORY OF PRESENT ILLNESS
[FreeTextEntry1] : wt is slowly gaining, has had a chronic problem with her right foot  and found to have severe OA and being treated with much relief with wrapping  and PT.  Still having a lot of GI issues, had and egd, planned for MRI \par \par breakfast: \par none \par \par lunch : 11\par fruit \par \par snack\par 1 pm\par salad with chicken , \par \par dinner\par hamburger with no bread\par \par has not been able exercise due to her foot , able to walk her dog \par \par stopped her metformin to see if her stomach improved, but no change \par

## 2022-08-03 NOTE — PHYSICAL EXAM
[No Acute Distress] : no acute distress [No Respiratory Distress] : no respiratory distress  [Normal Rate] : normal rate  [No Edema] : there was no peripheral edema [Normal Mood] : the mood was normal [Normal] : affect was normal and insight and judgment were intact [de-identified] : right ankle
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
## 2022-09-07 ENCOUNTER — APPOINTMENT (OUTPATIENT)
Dept: FAMILY MEDICINE | Facility: CLINIC | Age: 57
End: 2022-09-07

## 2022-09-07 ENCOUNTER — NON-APPOINTMENT (OUTPATIENT)
Age: 57
End: 2022-09-07

## 2022-09-07 ENCOUNTER — APPOINTMENT (OUTPATIENT)
Dept: BARIATRICS/WEIGHT MGMT | Facility: CLINIC | Age: 57
End: 2022-09-07

## 2022-09-07 PROCEDURE — 99214 OFFICE O/P EST MOD 30 MIN: CPT | Mod: 95

## 2022-09-07 NOTE — PLAN
[FreeTextEntry1] : Gastroenteritis seems to be food borne and self limiting.\par Increase fluids,\par Diet as tolerated.\par Return to office if symptoms worsen or persist.\par

## 2022-09-07 NOTE — HISTORY OF PRESENT ILLNESS
[FreeTextEntry8] : Pt c/o vomiting x 1 this am.\par Pt also with fever today, T100.1.\par No abdominal pain or cough. Mild nasal congestion, chronic.\par Pt c/o ongoing intermittent panic attacks and insomnia. Adequate relief with current medications.

## 2022-09-26 ENCOUNTER — APPOINTMENT (OUTPATIENT)
Dept: OBGYN | Facility: CLINIC | Age: 57
End: 2022-09-26

## 2022-09-26 ENCOUNTER — LABORATORY RESULT (OUTPATIENT)
Age: 57
End: 2022-09-26

## 2022-09-26 VITALS
BODY MASS INDEX: 50.02 KG/M2 | WEIGHT: 293 LBS | SYSTOLIC BLOOD PRESSURE: 130 MMHG | HEIGHT: 64 IN | DIASTOLIC BLOOD PRESSURE: 80 MMHG

## 2022-09-26 DIAGNOSIS — Z13.820 ENCOUNTER FOR SCREENING FOR OSTEOPOROSIS: ICD-10-CM

## 2022-09-26 PROCEDURE — 99396 PREV VISIT EST AGE 40-64: CPT

## 2022-09-29 ENCOUNTER — APPOINTMENT (OUTPATIENT)
Dept: FAMILY MEDICINE | Facility: CLINIC | Age: 57
End: 2022-09-29

## 2022-09-29 PROCEDURE — 99214 OFFICE O/P EST MOD 30 MIN: CPT | Mod: 95

## 2022-09-29 NOTE — PHYSICAL EXAM
[de-identified] : Telehealth precludes traditional, comprehensive physical exam. Patient appeared stable and alert.

## 2022-09-29 NOTE — HISTORY OF PRESENT ILLNESS
[Home] : at home, [unfilled] , at the time of the visit. [Medical Office: ([facility])___] : at the medical office located in  [Verbal consent obtained from patient] : the patient, [unfilled] [FreeTextEntry1] : cough, body aches\par Refill xanax [de-identified] :  Ms. DAMION ALVARADO is a 57 year  female presents for telehealth appointment c/o of cough and body aches for past 3 days. She also felt febrile with temp of 99.1 this morning. She was unable to sleep through night due to cough and her head is pounding due to the constant cough. She took benzonatate with no relief. \par She is requesting refill of xanax as well.

## 2022-09-30 LAB
RAPID RVP RESULT: DETECTED
SARS-COV-2 RNA PNL RESP NAA+PROBE: DETECTED

## 2022-10-03 ENCOUNTER — APPOINTMENT (OUTPATIENT)
Dept: FAMILY MEDICINE | Facility: CLINIC | Age: 57
End: 2022-10-03

## 2022-10-05 ENCOUNTER — APPOINTMENT (OUTPATIENT)
Dept: FAMILY MEDICINE | Facility: CLINIC | Age: 57
End: 2022-10-05

## 2022-10-05 PROCEDURE — 99213 OFFICE O/P EST LOW 20 MIN: CPT | Mod: 95

## 2022-10-09 NOTE — HISTORY OF PRESENT ILLNESS
[Home] : at home, [unfilled] , at the time of the visit. [Medical Office: ([facility])___] : at the medical office located in  [Verbal consent obtained from patient] : the patient, [unfilled] [FreeTextEntry8] : Patient is following up after she was diagnosed with covid about 1 week ago. She completed course of paxlovid but states nicole symptoms have not completely resolved. She continues to have some cough, nausea, sore throat, body aches, and fever on/off x 1.5 week. Most bothersome symptom is the cough that is keeping her up at night.

## 2022-10-09 NOTE — PLAN
[FreeTextEntry1] : Take Benzonatate PRN, supportive treatment. Patient likely with rebound symptoms.
no discrete location documentation necessary

## 2022-10-09 NOTE — PHYSICAL EXAM
[de-identified] : Telehealth precludes traditional, comprehensive physical exam. Patient appeared stable and alert.

## 2022-10-11 ENCOUNTER — APPOINTMENT (OUTPATIENT)
Dept: FAMILY MEDICINE | Facility: CLINIC | Age: 57
End: 2022-10-11

## 2022-10-11 PROCEDURE — 99213 OFFICE O/P EST LOW 20 MIN: CPT | Mod: 95

## 2022-10-11 RX ORDER — NIRMATRELVIR AND RITONAVIR 300-100 MG
20 X 150 MG & KIT ORAL
Qty: 5 | Refills: 0 | Status: DISCONTINUED | COMMUNITY
Start: 2022-09-30 | End: 2022-10-11

## 2022-10-12 ENCOUNTER — TRANSCRIPTION ENCOUNTER (OUTPATIENT)
Age: 57
End: 2022-10-12

## 2022-10-12 ENCOUNTER — APPOINTMENT (OUTPATIENT)
Dept: BARIATRICS/WEIGHT MGMT | Facility: CLINIC | Age: 57
End: 2022-10-12

## 2022-10-14 NOTE — PHYSICAL EXAM
[de-identified] : Telehealth precludes traditional, comprehensive physical exam. Patient appeared stable and alert.

## 2022-10-14 NOTE — HISTORY OF PRESENT ILLNESS
[Home] : at home, [unfilled] , at the time of the visit. [Medical Office: ([facility])___] : at the medical office located in  [Verbal consent obtained from patient] : the patient, [unfilled] [FreeTextEntry1] : Patient is following up on COVID 19 infection.\par Pt. states symptoms are not improving. [de-identified] :  Ms. DAMION ALVARADO is a 57 year female presents for telehealth appointment  due to persistent symptoms due to covid-19 infection. Patient states her cough has not improved and it is causing her ribs to hurt all the time. She is not sleeping well, and Benzonatate is not working well. She has spilled her previous Rx for codeine for the cough so she never really tried it. She denies fever or chills. Her oxygen saturations has been normal. \par She is currently unable to leave her house as she feels too unwell to drive and she has no other means of transportation.

## 2022-10-16 LAB
CYTOLOGY CVX/VAG DOC THIN PREP: NORMAL
HPV HIGH+LOW RISK DNA PNL CVX: DETECTED

## 2022-10-18 ENCOUNTER — APPOINTMENT (OUTPATIENT)
Dept: FAMILY MEDICINE | Facility: CLINIC | Age: 57
End: 2022-10-18

## 2022-10-18 ENCOUNTER — NON-APPOINTMENT (OUTPATIENT)
Age: 57
End: 2022-10-18

## 2022-10-18 PROCEDURE — 99213 OFFICE O/P EST LOW 20 MIN: CPT | Mod: 95

## 2022-10-18 NOTE — HISTORY OF PRESENT ILLNESS
[Home] : at home, [unfilled] , at the time of the visit. [Medical Office: ([facility])___] : at the medical office located in  [Verbal consent obtained from patient] : the patient, [unfilled] [FreeTextEntry1] : Patient is following up on lingering cough from COVID 19. [de-identified] : Patient continues to have pertinent cough, sounding like a seal. Cough is severe and sometimes she feels like throwing up after the coughing fits. Cough is affecting her ability to sleep, she continues to feel fatigued. Covid was diagnosed on 09/29/22. She is s/p paxlovid, oral steroids.

## 2022-10-18 NOTE — PHYSICAL EXAM
[de-identified] : Telehealth precludes traditional, comprehensive physical exam. Patient appeared stable and alert.

## 2022-10-19 ENCOUNTER — APPOINTMENT (OUTPATIENT)
Dept: RADIOLOGY | Facility: CLINIC | Age: 57
End: 2022-10-19

## 2022-10-19 ENCOUNTER — APPOINTMENT (OUTPATIENT)
Dept: FAMILY MEDICINE | Facility: CLINIC | Age: 57
End: 2022-10-19

## 2022-10-19 ENCOUNTER — OUTPATIENT (OUTPATIENT)
Dept: OUTPATIENT SERVICES | Facility: HOSPITAL | Age: 57
LOS: 1 days | End: 2022-10-19
Payer: COMMERCIAL

## 2022-10-19 VITALS
DIASTOLIC BLOOD PRESSURE: 100 MMHG | OXYGEN SATURATION: 95 % | RESPIRATION RATE: 16 BRPM | SYSTOLIC BLOOD PRESSURE: 130 MMHG | HEIGHT: 64 IN | HEART RATE: 91 BPM | TEMPERATURE: 97.8 F | WEIGHT: 293 LBS | BODY MASS INDEX: 50.02 KG/M2

## 2022-10-19 DIAGNOSIS — R05.3 CHRONIC COUGH: ICD-10-CM

## 2022-10-19 DIAGNOSIS — Z98.89 OTHER SPECIFIED POSTPROCEDURAL STATES: Chronic | ICD-10-CM

## 2022-10-19 PROCEDURE — 99213 OFFICE O/P EST LOW 20 MIN: CPT

## 2022-10-19 PROCEDURE — 71046 X-RAY EXAM CHEST 2 VIEWS: CPT

## 2022-10-19 PROCEDURE — 71046 X-RAY EXAM CHEST 2 VIEWS: CPT | Mod: 26

## 2022-10-19 RX ORDER — DEXAMETHASONE 6 MG/1
6 TABLET ORAL DAILY
Qty: 5 | Refills: 0 | Status: COMPLETED | COMMUNITY
Start: 2022-10-11 | End: 2022-10-19

## 2022-10-19 NOTE — PLAN
[FreeTextEntry1] : DBP elevated in office today, no obvious respiratory distress, patient coughing in between short sentences.\par Treat for post viral reactive airway disease. Patient has had unpleasant reaction to albuterol, will not use DAYA, treat with LABA+ ICS combination.

## 2022-10-19 NOTE — PHYSICAL EXAM
[Normal Outer Ear/Nose] : the outer ears and nose were normal in appearance [Supple] : supple [Normal] : no respiratory distress, lungs were clear to auscultation bilaterally and no accessory muscle use [Normal Rate] : normal rate  [Regular Rhythm] : with a regular rhythm [Normal S1, S2] : normal S1 and S2 [Grossly Normal Strength/Tone] : grossly normal strength/tone [No Focal Deficits] : no focal deficits [Normal Affect] : the affect was normal

## 2022-10-19 NOTE — HISTORY OF PRESENT ILLNESS
[FreeTextEntry1] : Patient is follow up on persistent cough after she was recently diagnosed with covid. [de-identified] : She notes that cough is triggered when she takes in a deep breath or when she is talking. She feels generally unwell. She had xray taken this morning, pending official read, but brief read by me, no table evidence of pneumonia but will await official read from radiology.

## 2022-10-20 NOTE — COUNSELING
[Nutrition/ Exercise/ Weight Management] : nutrition, exercise, weight management [Sunscreen] : sunscreen [Vitamins/Supplements] : vitamins/supplements [Breast Self Exam] : breast self exam [Bladder Hygiene] : bladder hygiene

## 2022-10-20 NOTE — PHYSICAL EXAM
[Chaperone Present] : A chaperone was present in the examining room during all aspects of the physical examination [Appropriately responsive] : appropriately responsive [Alert] : alert [No Acute Distress] : no acute distress [No Lymphadenopathy] : no lymphadenopathy [Regular Rate Rhythm] : regular rate rhythm [No Murmurs] : no murmurs [Clear to Auscultation B/L] : clear to auscultation bilaterally [Soft] : soft [Non-tender] : non-tender [Non-distended] : non-distended [No Lesions] : no lesions [No HSM] : No HSM [No Mass] : no mass [Oriented x3] : oriented x3 [Examination Of The Breasts] : a normal appearance [No Masses] : no breast masses were palpable [Labia Majora] : normal [Labia Minora] : normal [Normal] : normal [Uterine Adnexae] : normal

## 2022-10-20 NOTE — HISTORY OF PRESENT ILLNESS
[Patient reported colonoscopy was normal] : Patient reported colonoscopy was normal [postmenopausal] : postmenopausal [N] : Patient denies prior pregnancies [Menarche Age: ____] : age at menarche was [unfilled] [No] : Patient does not have concerns regarding sex [Patient reported mammogram was normal] : Patient reported mammogram was normal [Patient reported PAP Smear was normal] : Patient reported PAP Smear was normal [Mammogramdate] : 09/25/21 [TextBox_19] : BR1 [PapSmeardate] : 09/18/21 [TextBox_31] : NEG [ColonoscopyDate] : 2021 [HPVDate] : 0918/21 [TextBox_78] : NEG [LMPDate] : 03/17/17 [PGHxTotal] : 0 [FreeTextEntry1] : 03/17/17

## 2022-10-28 ENCOUNTER — APPOINTMENT (OUTPATIENT)
Dept: MAMMOGRAPHY | Facility: CLINIC | Age: 57
End: 2022-10-28

## 2022-10-28 ENCOUNTER — OUTPATIENT (OUTPATIENT)
Dept: OUTPATIENT SERVICES | Facility: HOSPITAL | Age: 57
LOS: 1 days | End: 2022-10-28
Payer: COMMERCIAL

## 2022-10-28 ENCOUNTER — RESULT REVIEW (OUTPATIENT)
Age: 57
End: 2022-10-28

## 2022-10-28 ENCOUNTER — APPOINTMENT (OUTPATIENT)
Dept: RADIOLOGY | Facility: CLINIC | Age: 57
End: 2022-10-28

## 2022-10-28 DIAGNOSIS — Z00.8 ENCOUNTER FOR OTHER GENERAL EXAMINATION: ICD-10-CM

## 2022-10-28 DIAGNOSIS — Z13.820 ENCOUNTER FOR SCREENING FOR OSTEOPOROSIS: ICD-10-CM

## 2022-10-28 DIAGNOSIS — Z12.31 ENCOUNTER FOR SCREENING MAMMOGRAM FOR MALIGNANT NEOPLASM OF BREAST: ICD-10-CM

## 2022-10-28 DIAGNOSIS — Z98.89 OTHER SPECIFIED POSTPROCEDURAL STATES: Chronic | ICD-10-CM

## 2022-10-28 PROCEDURE — 77085 DXA BONE DENSITY AXL VRT FX: CPT | Mod: 26

## 2022-10-28 PROCEDURE — 77067 SCR MAMMO BI INCL CAD: CPT | Mod: 26

## 2022-10-28 PROCEDURE — 77067 SCR MAMMO BI INCL CAD: CPT

## 2022-10-28 PROCEDURE — 77063 BREAST TOMOSYNTHESIS BI: CPT

## 2022-10-28 PROCEDURE — 77063 BREAST TOMOSYNTHESIS BI: CPT | Mod: 26

## 2022-10-28 PROCEDURE — 77085 DXA BONE DENSITY AXL VRT FX: CPT

## 2022-10-31 ENCOUNTER — APPOINTMENT (OUTPATIENT)
Dept: PULMONOLOGY | Facility: CLINIC | Age: 57
End: 2022-10-31

## 2022-11-03 ENCOUNTER — APPOINTMENT (OUTPATIENT)
Dept: FAMILY MEDICINE | Facility: CLINIC | Age: 57
End: 2022-11-03

## 2022-11-03 VITALS
HEART RATE: 76 BPM | OXYGEN SATURATION: 97 % | BODY MASS INDEX: 50.02 KG/M2 | DIASTOLIC BLOOD PRESSURE: 80 MMHG | WEIGHT: 293 LBS | HEIGHT: 64 IN | TEMPERATURE: 97.7 F | SYSTOLIC BLOOD PRESSURE: 132 MMHG

## 2022-11-03 DIAGNOSIS — U07.1 COVID-19: ICD-10-CM

## 2022-11-03 PROCEDURE — 99214 OFFICE O/P EST MOD 30 MIN: CPT

## 2022-11-03 RX ORDER — PANTOPRAZOLE 40 MG/1
40 TABLET, DELAYED RELEASE ORAL DAILY
Qty: 90 | Refills: 1 | Status: DISCONTINUED | COMMUNITY
Start: 2022-03-02 | End: 2022-11-03

## 2022-11-03 NOTE — HISTORY OF PRESENT ILLNESS
[FreeTextEntry1] : Pt is following up on post Covid cough.\par Cough is dry worse with inspiration. Denies GAFFNEY. No fever.\par Pt tested + for Covid on 9/29/22.\par She has been dealing with persistent dry cough. CXR neg 2 wks ago.\par Started on Symbicort 1 wk ago with incomplete relief. Cough spells are sporadic and associated with lightheadedness.\par Some relief with cough medication.\par Pt fell 2 wks ago onto right knee due to generalized weakness.\par Has right knee contusion and tore right hamstring. Seen and txed by her orthopedist.

## 2022-11-09 ENCOUNTER — APPOINTMENT (OUTPATIENT)
Dept: BARIATRICS/WEIGHT MGMT | Facility: CLINIC | Age: 57
End: 2022-11-09

## 2022-11-09 VITALS
WEIGHT: 293 LBS | BODY MASS INDEX: 50.02 KG/M2 | TEMPERATURE: 97 F | HEART RATE: 74 BPM | HEIGHT: 64 IN | OXYGEN SATURATION: 98 % | DIASTOLIC BLOOD PRESSURE: 83 MMHG | SYSTOLIC BLOOD PRESSURE: 148 MMHG

## 2022-11-09 PROCEDURE — 99213 OFFICE O/P EST LOW 20 MIN: CPT

## 2022-11-09 NOTE — HISTORY OF PRESENT ILLNESS
[FreeTextEntry1] : here for follow up, had covid that lasted for two months, was treated with  Paxlovid, starting on 9/29 and continues to complain of a dry cough. Still complains of exhaustion, some brain fog, and is slowly getting better\par Her wt is up a little form her last visit in Aug prior to her getting ill. \par Working mostly from home, going to work 2x week. \par \par Breakfast: \par nothing : nauseated taking one tab and has not increased \par \par lunch \par nothing \par \par \par Dinner \par chicken soup: chicken with veggies, \par metformin \par \par exer none
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
## 2022-11-16 ENCOUNTER — TRANSCRIPTION ENCOUNTER (OUTPATIENT)
Age: 57
End: 2022-11-16

## 2022-11-22 ENCOUNTER — APPOINTMENT (OUTPATIENT)
Dept: FAMILY MEDICINE | Facility: CLINIC | Age: 57
End: 2022-11-22

## 2022-12-05 ENCOUNTER — APPOINTMENT (OUTPATIENT)
Dept: FAMILY MEDICINE | Facility: CLINIC | Age: 57
End: 2022-12-05

## 2022-12-05 VITALS
HEIGHT: 64 IN | RESPIRATION RATE: 16 BRPM | TEMPERATURE: 97.5 F | WEIGHT: 293 LBS | HEART RATE: 95 BPM | OXYGEN SATURATION: 98 % | DIASTOLIC BLOOD PRESSURE: 86 MMHG | SYSTOLIC BLOOD PRESSURE: 136 MMHG | BODY MASS INDEX: 50.02 KG/M2

## 2022-12-05 DIAGNOSIS — B34.9 VIRAL INFECTION, UNSPECIFIED: ICD-10-CM

## 2022-12-05 DIAGNOSIS — K52.9 NONINFECTIVE GASTROENTERITIS AND COLITIS, UNSPECIFIED: ICD-10-CM

## 2022-12-05 PROCEDURE — 99214 OFFICE O/P EST MOD 30 MIN: CPT

## 2022-12-05 RX ORDER — FAMOTIDINE 20 MG/1
20 TABLET, FILM COATED ORAL DAILY
Qty: 90 | Refills: 0 | Status: DISCONTINUED | COMMUNITY
Start: 2022-03-02 | End: 2022-12-05

## 2022-12-05 NOTE — HISTORY OF PRESENT ILLNESS
[FreeTextEntry1] : Pt had prolonged cough after Covid infection.\par Energy is coming back. She is working a lot.\par c/o urinary frequency x 1 month including nocturia. No dysuria.\par Has chronic anxiety disorder, stable.\par \par

## 2022-12-05 NOTE — PHYSICAL EXAM
[No Acute Distress] : no acute distress [Normal Sclera/Conjunctiva] : normal sclera/conjunctiva [No Lymphadenopathy] : no lymphadenopathy [No Respiratory Distress] : no respiratory distress  [Clear to Auscultation] : lungs were clear to auscultation bilaterally [Increased E/I Ratio] : the expiratory/inspiratory ratio was increased [No Edema] : there was no peripheral edema [Normal] : affect was normal and insight and judgment were intact

## 2022-12-07 LAB
APPEARANCE: ABNORMAL
BACTERIA UR CULT: NORMAL
BACTERIA: NEGATIVE
BILIRUBIN URINE: NEGATIVE
BLOOD URINE: NEGATIVE
COLOR: ABNORMAL
GLUCOSE QUALITATIVE U: NEGATIVE
HYALINE CASTS: 3 /LPF
KETONES URINE: NEGATIVE
LEUKOCYTE ESTERASE URINE: NEGATIVE
MICROSCOPIC-UA: NORMAL
NITRITE URINE: NEGATIVE
PH URINE: 5.5
PROTEIN URINE: ABNORMAL
RED BLOOD CELLS URINE: 4 /HPF
SPECIFIC GRAVITY URINE: 1.03
SQUAMOUS EPITHELIAL CELLS: 16 /HPF
URINE COMMENTS: NORMAL
UROBILINOGEN URINE: NORMAL
WHITE BLOOD CELLS URINE: 3 /HPF

## 2022-12-12 ENCOUNTER — TRANSCRIPTION ENCOUNTER (OUTPATIENT)
Age: 57
End: 2022-12-12

## 2022-12-12 LAB
ALBUMIN SERPL ELPH-MCNC: 4.4 G/DL
ALP BLD-CCNC: 105 U/L
ALT SERPL-CCNC: 6 U/L
ANION GAP SERPL CALC-SCNC: 10 MMOL/L
AST SERPL-CCNC: 14 U/L
BASOPHILS # BLD AUTO: 0.04 K/UL
BASOPHILS NFR BLD AUTO: 0.9 %
BILIRUB SERPL-MCNC: 0.3 MG/DL
BUN SERPL-MCNC: 15 MG/DL
CALCIUM SERPL-MCNC: 9.3 MG/DL
CHLORIDE SERPL-SCNC: 105 MMOL/L
CHOLEST SERPL-MCNC: 235 MG/DL
CO2 SERPL-SCNC: 25 MMOL/L
CREAT SERPL-MCNC: 0.69 MG/DL
EGFR: 100 ML/MIN/1.73M2
EOSINOPHIL # BLD AUTO: 0.08 K/UL
EOSINOPHIL NFR BLD AUTO: 1.8 %
ESTIMATED AVERAGE GLUCOSE: 111 MG/DL
GLUCOSE SERPL-MCNC: 105 MG/DL
HBA1C MFR BLD HPLC: 5.5 %
HCT VFR BLD CALC: 37.9 %
HDLC SERPL-MCNC: 78 MG/DL
HGB BLD-MCNC: 12.7 G/DL
IMM GRANULOCYTES NFR BLD AUTO: 0.5 %
LDLC SERPL CALC-MCNC: 141 MG/DL
LYMPHOCYTES # BLD AUTO: 1.32 K/UL
LYMPHOCYTES NFR BLD AUTO: 29.9 %
MAN DIFF?: NORMAL
MCHC RBC-ENTMCNC: 30.4 PG
MCHC RBC-ENTMCNC: 33.5 GM/DL
MCV RBC AUTO: 90.7 FL
MONOCYTES # BLD AUTO: 0.33 K/UL
MONOCYTES NFR BLD AUTO: 7.5 %
NEUTROPHILS # BLD AUTO: 2.63 K/UL
NEUTROPHILS NFR BLD AUTO: 59.4 %
NONHDLC SERPL-MCNC: 157 MG/DL
PLATELET # BLD AUTO: 225 K/UL
POTASSIUM SERPL-SCNC: 4.5 MMOL/L
PROT SERPL-MCNC: 6.3 G/DL
RBC # BLD: 4.18 M/UL
RBC # FLD: 13.2 %
SODIUM SERPL-SCNC: 140 MMOL/L
TRIGL SERPL-MCNC: 82 MG/DL
TSH SERPL-ACNC: 2.03 UIU/ML
WBC # FLD AUTO: 4.42 K/UL

## 2022-12-17 ENCOUNTER — APPOINTMENT (OUTPATIENT)
Dept: OBGYN | Facility: CLINIC | Age: 57
End: 2022-12-17

## 2022-12-17 DIAGNOSIS — Z11.51 ENCOUNTER FOR SCREENING FOR HUMAN PAPILLOMAVIRUS (HPV): ICD-10-CM

## 2022-12-17 DIAGNOSIS — B97.7 PAPILLOMAVIRUS AS THE CAUSE OF DISEASES CLASSIFIED ELSEWHERE: ICD-10-CM

## 2022-12-17 DIAGNOSIS — R87.615 UNSATISFACTORY CYTOLOGIC SMEAR OF CERVIX: ICD-10-CM

## 2022-12-17 PROCEDURE — 99212 OFFICE O/P EST SF 10 MIN: CPT

## 2022-12-17 NOTE — PHYSICAL EXAM
[Appropriately responsive] : appropriately responsive [Alert] : alert [No Acute Distress] : no acute distress [Oriented x3] : oriented x3 [No Lesions] : no lesions  [Labia Majora] : normal [Labia Minora] : normal [Pink Rugae] : pink rugae [No Bleeding] : There was no active vaginal bleeding [Normal] : normal

## 2022-12-17 NOTE — HISTORY OF PRESENT ILLNESS
[N] : Patient does not use contraception [No] : Patient does not have concerns regarding sex [Currently Active] : currently active [Men] : men [Vaginal] : vaginal [TextBox_4] : Re pap [TextBox_19] : br 1 [Mammogramdate] : 10/28/22 [PapSmeardate] : 9/26/22 [TextBox_31] : no ecc [TextBox_78] : negative  [LMPDate] : 7 yrs [PGHxTotal] : 0 [PGHxFullTerm] : 0 [[facility] Healthcare[facility]Living] : 0 [TextBox_6] : 7 yrs [FreeTextEntry1] : 7 yrs

## 2022-12-17 NOTE — DISCUSSION/SUMMARY
[FreeTextEntry1] : Pt aware pending results any further f/u.  All the pt's questions and concerns were addressed.

## 2022-12-20 LAB — HPV HIGH+LOW RISK DNA PNL CVX: NOT DETECTED

## 2022-12-28 ENCOUNTER — APPOINTMENT (OUTPATIENT)
Dept: BARIATRICS/WEIGHT MGMT | Facility: CLINIC | Age: 57
End: 2022-12-28

## 2022-12-30 ENCOUNTER — TRANSCRIPTION ENCOUNTER (OUTPATIENT)
Age: 57
End: 2022-12-30

## 2023-01-02 LAB — CYTOLOGY CVX/VAG DOC THIN PREP: NORMAL

## 2023-01-04 ENCOUNTER — APPOINTMENT (OUTPATIENT)
Dept: FAMILY MEDICINE | Facility: CLINIC | Age: 58
End: 2023-01-04
Payer: COMMERCIAL

## 2023-01-04 ENCOUNTER — APPOINTMENT (OUTPATIENT)
Dept: BARIATRICS/WEIGHT MGMT | Facility: CLINIC | Age: 58
End: 2023-01-04
Payer: COMMERCIAL

## 2023-01-04 VITALS
WEIGHT: 293 LBS | BODY MASS INDEX: 50.02 KG/M2 | RESPIRATION RATE: 16 BRPM | DIASTOLIC BLOOD PRESSURE: 82 MMHG | SYSTOLIC BLOOD PRESSURE: 148 MMHG | HEART RATE: 96 BPM | HEIGHT: 64 IN | TEMPERATURE: 97.3 F | OXYGEN SATURATION: 96 %

## 2023-01-04 VITALS
OXYGEN SATURATION: 98 % | DIASTOLIC BLOOD PRESSURE: 85 MMHG | RESPIRATION RATE: 16 BRPM | HEART RATE: 74 BPM | SYSTOLIC BLOOD PRESSURE: 143 MMHG | TEMPERATURE: 97.3 F

## 2023-01-04 DIAGNOSIS — R35.0 FREQUENCY OF MICTURITION: ICD-10-CM

## 2023-01-04 DIAGNOSIS — E87.0 HYPEROSMOLALITY AND HYPERNATREMIA: ICD-10-CM

## 2023-01-04 PROCEDURE — 99214 OFFICE O/P EST MOD 30 MIN: CPT

## 2023-01-04 PROCEDURE — 99213 OFFICE O/P EST LOW 20 MIN: CPT

## 2023-01-04 RX ORDER — TRAMADOL HYDROCHLORIDE 50 MG/1
50 TABLET, COATED ORAL TWICE DAILY
Qty: 28 | Refills: 0 | Status: DISCONTINUED | COMMUNITY
Start: 2022-03-02 | End: 2023-01-04

## 2023-01-04 RX ORDER — MOMETASONE 50 UG/1
50 SPRAY, METERED NASAL DAILY
Qty: 1 | Refills: 1 | Status: DISCONTINUED | COMMUNITY
Start: 2022-05-02 | End: 2023-01-04

## 2023-01-04 NOTE — HISTORY OF PRESENT ILLNESS
[FreeTextEntry1] : Pt c/o urine frequency/urgency, chronic.\par Last urine cx 1 month ago was negative.\par Patient is also following up on hypertension and hyperlipidemia.\par She suffers from chronic insomnia and anxiety disorder with panic attacks.\par Symptoms have been stable on current medications.\par She has ongoing chronic right knee pain since her right knee replacement 2 yrs ago.\par She is planning on seeing her orthopedist about it.\par
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
## 2023-01-04 NOTE — PHYSICAL EXAM
[No Acute Distress] : no acute distress [Normal Sclera/Conjunctiva] : normal sclera/conjunctiva [No Lymphadenopathy] : no lymphadenopathy [No Respiratory Distress] : no respiratory distress  [Clear to Auscultation] : lungs were clear to auscultation bilaterally [No Edema] : there was no peripheral edema [Normal] : affect was normal and insight and judgment were intact

## 2023-01-04 NOTE — HISTORY OF PRESENT ILLNESS
[FreeTextEntry1] : Here for follow up for wt management, gained 10 lbs over the last 2 months , has finally recovered from the covid infection that she had, her cough finally resolved. Seen by PMD earlier today and started on crestor. \par \par breakfast: \par none \par \par lunch \par none \par \par dinner \par chicken soup with veggies: celery, carrots\par \par exer:\par must work with a   and will start on a treadmill and possible bike \par \par ETOH none\par \par sleep \par better

## 2023-01-04 NOTE — ASSESSMENT
[FreeTextEntry1] : Start Rosuvastatin due to persistent hyperlipidemia.\par Continued adherence to low cholesterol diet.\par Check lipids/LFTs in 6 wks.\par

## 2023-01-10 ENCOUNTER — TRANSCRIPTION ENCOUNTER (OUTPATIENT)
Age: 58
End: 2023-01-10

## 2023-02-01 ENCOUNTER — APPOINTMENT (OUTPATIENT)
Dept: BARIATRICS/WEIGHT MGMT | Facility: CLINIC | Age: 58
End: 2023-02-01
Payer: COMMERCIAL

## 2023-02-01 VITALS
RESPIRATION RATE: 14 BRPM | WEIGHT: 293 LBS | DIASTOLIC BLOOD PRESSURE: 88 MMHG | BODY MASS INDEX: 50.02 KG/M2 | HEIGHT: 64 IN | TEMPERATURE: 97.5 F | HEART RATE: 77 BPM | SYSTOLIC BLOOD PRESSURE: 141 MMHG | OXYGEN SATURATION: 97 %

## 2023-02-01 PROCEDURE — 99213 OFFICE O/P EST LOW 20 MIN: CPT
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
## 2023-02-01 NOTE — HISTORY OF PRESENT ILLNESS
[FreeTextEntry1] : here for follow up for  wt management but her wt keeps going up\par \par breakfast: \par none\par \par lunch : 1p\par chinese: tofu, broccoli,and brown rice:  \par or \par chicken noodle soup \par \par snack\par pear\par \par dinner: 6: 30 \par grilled chicken  with a the skin with side salad\par no dressing, water \par \par exer: walking with or without the treadmill, 1.5. lbs added: 1 hour daily \par \par sleep : \par harder 3-4 hours per night

## 2023-02-03 ENCOUNTER — TRANSCRIPTION ENCOUNTER (OUTPATIENT)
Age: 58
End: 2023-02-03

## 2023-02-03 ENCOUNTER — APPOINTMENT (OUTPATIENT)
Dept: FAMILY MEDICINE | Facility: CLINIC | Age: 58
End: 2023-02-03
Payer: COMMERCIAL

## 2023-02-03 VITALS
HEART RATE: 75 BPM | BODY MASS INDEX: 50.02 KG/M2 | HEIGHT: 64 IN | SYSTOLIC BLOOD PRESSURE: 132 MMHG | WEIGHT: 293 LBS | OXYGEN SATURATION: 98 % | DIASTOLIC BLOOD PRESSURE: 76 MMHG

## 2023-02-03 PROCEDURE — 99214 OFFICE O/P EST MOD 30 MIN: CPT

## 2023-02-03 RX ORDER — DICLOFENAC SODIUM 100 MG/1
100 TABLET, FILM COATED, EXTENDED RELEASE ORAL
Qty: 30 | Refills: 1 | Status: DISCONTINUED | COMMUNITY
Start: 2022-06-01 | End: 2023-02-03

## 2023-02-03 NOTE — HISTORY OF PRESENT ILLNESS
[FreeTextEntry1] : Pt is following up on chronic right knee pain and hyperlipidemia.\par She states that her right knee is feeling much better.  She is continuing with home stretching exercises.\par Patient is tolerating her rosuvastatin and is due for surveillance blood work with LFTs and lipids.\par She has chronic anxiety and has adequate relief with alprazolam as needed.  She has been cutting back on the alprazolam to 1-2 times daily and feels she is coping with her current anxiety levels.

## 2023-02-03 NOTE — ASSESSMENT
[FreeTextEntry1] : Patient is currently stable.\par Continue current medications.\par Fasting blood work prior to follow-up.

## 2023-02-03 NOTE — PHYSICAL EXAM
[No Acute Distress] : no acute distress [Normal Sclera/Conjunctiva] : normal sclera/conjunctiva [No Lymphadenopathy] : no lymphadenopathy [No Respiratory Distress] : no respiratory distress  [Clear to Auscultation] : lungs were clear to auscultation bilaterally [Normal Rate] : normal rate  [Normal S1, S2] : normal S1 and S2 [No Murmur] : no murmur heard [No Edema] : there was no peripheral edema [No Focal Deficits] : no focal deficits [Normal] : affect was normal and insight and judgment were intact [de-identified] : Excellent ROM knees b/l.

## 2023-02-13 ENCOUNTER — TRANSCRIPTION ENCOUNTER (OUTPATIENT)
Age: 58
End: 2023-02-13

## 2023-02-22 ENCOUNTER — TRANSCRIPTION ENCOUNTER (OUTPATIENT)
Age: 58
End: 2023-02-22

## 2023-03-03 ENCOUNTER — APPOINTMENT (OUTPATIENT)
Dept: FAMILY MEDICINE | Facility: CLINIC | Age: 58
End: 2023-03-03
Payer: COMMERCIAL

## 2023-03-03 VITALS
WEIGHT: 293 LBS | HEIGHT: 64 IN | OXYGEN SATURATION: 98 % | BODY MASS INDEX: 50.02 KG/M2 | SYSTOLIC BLOOD PRESSURE: 132 MMHG | DIASTOLIC BLOOD PRESSURE: 74 MMHG | TEMPERATURE: 97.6 F | HEART RATE: 76 BPM

## 2023-03-03 PROCEDURE — 99214 OFFICE O/P EST MOD 30 MIN: CPT | Mod: 25

## 2023-03-03 PROCEDURE — 96372 THER/PROPH/DIAG INJ SC/IM: CPT

## 2023-03-03 RX ORDER — CYANOCOBALAMIN 1000 UG/ML
1000 INJECTION INTRAMUSCULAR; SUBCUTANEOUS
Qty: 0 | Refills: 0 | Status: COMPLETED | OUTPATIENT
Start: 2023-03-03

## 2023-03-03 RX ADMIN — CYANOCOBALAMIN 1 MCG/ML: 1000 INJECTION INTRAMUSCULAR; SUBCUTANEOUS at 00:00

## 2023-03-03 NOTE — ASSESSMENT
[FreeTextEntry1] : Take Triamterene-HCTZ once every day as bp has been running high. Pt was taking it prn edema.\par F/U 1 month.\par Pt to have fasting bw prior to f/u.\par She is due for lipid surveillance.

## 2023-03-03 NOTE — HISTORY OF PRESENT ILLNESS
[FreeTextEntry1] : Pt is following up on hypertension.\par She c/o persistent anxiety. Has adequate relief with alprazolam which also helps to abort panic episode.\par She has pain from her molar on the right. She is in the midst of a dental work up.\par She just started Wegovy and is tolerating it fine.

## 2023-03-03 NOTE — PHYSICAL EXAM
[No Acute Distress] : no acute distress [Normal Sclera/Conjunctiva] : normal sclera/conjunctiva [No Lymphadenopathy] : no lymphadenopathy [No Respiratory Distress] : no respiratory distress  [Clear to Auscultation] : lungs were clear to auscultation bilaterally [Normal Rate] : normal rate  [Normal S1, S2] : normal S1 and S2 [No Murmur] : no murmur heard [No Edema] : there was no peripheral edema [No Focal Deficits] : no focal deficits [Normal] : affect was normal and insight and judgment were intact [de-identified] : Excellent ROM knees b/l.

## 2023-03-10 ENCOUNTER — RX RENEWAL (OUTPATIENT)
Age: 58
End: 2023-03-10

## 2023-03-29 ENCOUNTER — RX RENEWAL (OUTPATIENT)
Age: 58
End: 2023-03-29

## 2023-04-03 ENCOUNTER — APPOINTMENT (OUTPATIENT)
Dept: FAMILY MEDICINE | Facility: CLINIC | Age: 58
End: 2023-04-03
Payer: COMMERCIAL

## 2023-04-03 VITALS
TEMPERATURE: 98.4 F | RESPIRATION RATE: 18 BRPM | HEART RATE: 97 BPM | OXYGEN SATURATION: 97 % | HEIGHT: 64 IN | BODY MASS INDEX: 50.02 KG/M2 | DIASTOLIC BLOOD PRESSURE: 88 MMHG | WEIGHT: 293 LBS | SYSTOLIC BLOOD PRESSURE: 138 MMHG

## 2023-04-03 DIAGNOSIS — Z23 ENCOUNTER FOR IMMUNIZATION: ICD-10-CM

## 2023-04-03 PROCEDURE — 99214 OFFICE O/P EST MOD 30 MIN: CPT

## 2023-04-03 RX ORDER — NAPROXEN 500 MG/1
500 TABLET ORAL
Qty: 60 | Refills: 1 | Status: DISCONTINUED | COMMUNITY
Start: 2021-09-01 | End: 2023-04-03

## 2023-04-03 RX ORDER — BUDESONIDE AND FORMOTEROL FUMARATE DIHYDRATE 160; 4.5 UG/1; UG/1
160-4.5 AEROSOL RESPIRATORY (INHALATION)
Qty: 1 | Refills: 0 | Status: DISCONTINUED | COMMUNITY
Start: 2022-10-19 | End: 2023-04-03

## 2023-04-03 NOTE — PHYSICAL EXAM
[No Acute Distress] : no acute distress [Normal Sclera/Conjunctiva] : normal sclera/conjunctiva [No Lymphadenopathy] : no lymphadenopathy [No Respiratory Distress] : no respiratory distress  [Clear to Auscultation] : lungs were clear to auscultation bilaterally [Normal Rate] : normal rate  [Normal S1, S2] : normal S1 and S2 [No Murmur] : no murmur heard [No Edema] : there was no peripheral edema [No Focal Deficits] : no focal deficits [Normal] : affect was normal and insight and judgment were intact

## 2023-04-03 NOTE — HISTORY OF PRESENT ILLNESS
[FreeTextEntry1] : Pis following up on anxiety.\par Her aunt  yesterday from cancer (Mother's sister-in-law).\par Also, 2 deaths of people she knew through work.\par It has been challenging for her emotionally, but overall she feels she is coping.\par She is due to have her lipids surveilled. She plans to go for fasting bw soon.\par Wegovy increased to 1 mg 3 days ago.\par Adjusting diet. Eating plant based now.\par Sugar cravings have diminished and she is feeling more energetic.\par \par

## 2023-04-10 ENCOUNTER — RX RENEWAL (OUTPATIENT)
Age: 58
End: 2023-04-10

## 2023-04-12 ENCOUNTER — APPOINTMENT (OUTPATIENT)
Dept: BARIATRICS/WEIGHT MGMT | Facility: CLINIC | Age: 58
End: 2023-04-12

## 2023-04-13 ENCOUNTER — NON-APPOINTMENT (OUTPATIENT)
Age: 58
End: 2023-04-13

## 2023-04-17 ENCOUNTER — TRANSCRIPTION ENCOUNTER (OUTPATIENT)
Age: 58
End: 2023-04-17

## 2023-04-17 LAB
ALBUMIN SERPL ELPH-MCNC: 4.7 G/DL
ALP BLD-CCNC: 127 U/L
ALT SERPL-CCNC: 11 U/L
ANION GAP SERPL CALC-SCNC: 17 MMOL/L
APPEARANCE: CLEAR
AST SERPL-CCNC: 19 U/L
BACTERIA UR CULT: NORMAL
BACTERIA: NEGATIVE /HPF
BILIRUB SERPL-MCNC: 0.8 MG/DL
BILIRUBIN URINE: NEGATIVE
BLOOD URINE: NEGATIVE
BUN SERPL-MCNC: 16 MG/DL
CALCIUM SERPL-MCNC: 9.6 MG/DL
CAST: 0 /LPF
CHLORIDE SERPL-SCNC: 100 MMOL/L
CHOLEST SERPL-MCNC: 196 MG/DL
CO2 SERPL-SCNC: 24 MMOL/L
COLOR: YELLOW
CREAT SERPL-MCNC: 0.89 MG/DL
EGFR: 75 ML/MIN/1.73M2
EPITHELIAL CELLS: 21 /HPF
GLUCOSE QUALITATIVE U: NEGATIVE MG/DL
GLUCOSE SERPL-MCNC: 92 MG/DL
HDLC SERPL-MCNC: 72 MG/DL
KETONES URINE: NEGATIVE MG/DL
LDLC SERPL CALC-MCNC: 104 MG/DL
LEUKOCYTE ESTERASE URINE: ABNORMAL
MICROSCOPIC-UA: NORMAL
NITRITE URINE: NEGATIVE
NONHDLC SERPL-MCNC: 125 MG/DL
PH URINE: 6.5
POTASSIUM SERPL-SCNC: 4.2 MMOL/L
PROT SERPL-MCNC: 6.9 G/DL
PROTEIN URINE: 30 MG/DL
RED BLOOD CELLS URINE: 2 /HPF
REVIEW: NORMAL
SODIUM SERPL-SCNC: 140 MMOL/L
SPECIFIC GRAVITY URINE: 1.02
TRIGL SERPL-MCNC: 106 MG/DL
UROBILINOGEN URINE: 0.2 MG/DL
WHITE BLOOD CELLS URINE: 7 /HPF

## 2023-04-18 ENCOUNTER — APPOINTMENT (OUTPATIENT)
Dept: BARIATRICS/WEIGHT MGMT | Facility: CLINIC | Age: 58
End: 2023-04-18
Payer: COMMERCIAL

## 2023-04-18 ENCOUNTER — APPOINTMENT (OUTPATIENT)
Dept: BARIATRICS/WEIGHT MGMT | Facility: CLINIC | Age: 58
End: 2023-04-18

## 2023-04-18 VITALS — HEIGHT: 64 IN | BODY MASS INDEX: 50.02 KG/M2 | WEIGHT: 293 LBS

## 2023-04-18 PROCEDURE — 99214 OFFICE O/P EST MOD 30 MIN: CPT | Mod: 95

## 2023-04-18 RX ORDER — METFORMIN ER 500 MG 500 MG/1
500 TABLET ORAL DAILY
Qty: 360 | Refills: 1 | Status: DISCONTINUED | COMMUNITY
Start: 2021-10-06 | End: 2023-04-18

## 2023-04-18 NOTE — HISTORY OF PRESENT ILLNESS
[Home] : at home, [unfilled] , at the time of the visit. [Medical Office: ([facility] [place]___] : at the medical office located in  [Verbal consent obtained from patient] : the patient, [unfilled] [FreeTextEntry1] : 57F PMH class III obesity, HTN, HLD, elevated triglycerides, fatty liver, OA knees and LBP, ca-ox crystals in urine, chronic constipation, GERD, hemantioma of liver, generalized anxiety, insomnia, pancreatic cyst, scalp psoriasis, \par who presents to weight management for follow-up.\par \par She follows with Dr. Mendosa.\par She takes Wegovy 1.0 mg/wk, no side effects at all. Has been on this dose for 2 weeks.\par She was previously on Metformin 2000 mg/d, no longer taking this.\par Last visit (2/1/23): 345 lbs\par Initial Visit (10/6/21): 324 lbs.\par Today: 330 lbs\par \par Started tracking. She has been eating pescatarean recently, more fish and 3 bean salad. \par Cut out junk food in the house, fried foods.\par 2 meals per day and fruit in the middle (apple, pear).\par Salad for work\par \par Treadmill, walking dog, has recovered from knee surgery and being more active.\par \par Speaking to gyms to find classes that aren't too impactful on knee but effective.

## 2023-04-18 NOTE — ASSESSMENT
[FreeTextEntry1] : 57F PMH class III obesity, HTN, HLD, elevated triglycerides, fatty liver, OA knees and LBP, ca-ox crystals in urine, chronic constipation, GERD, hemantioma of liver, generalized anxiety, insomnia, pancreatic cyst, scalp psoriasis, \par who presents to weight management for follow-up.\par \par # Class III obesity c/b HTN, HLD, fatty liver, OA, GERD: Weight 330 lbs, has lost 15 lbs since last visit ~10 weeks ago. She has escalated from Wegovy 0.25 mg/wk to curent dose of 1.0 mg/wk, no side effects, feels hunger is increasing. Eating a pescatarean diet, removing 'junk' from house and fried foods from diet. Incorporating fruit and vegetables. Getting more activity walking, is aiming to incorporate a gym class for more intensive exercise without knee pain\par - Food log\par - Meal planning/prep, c/w at least 2 meals per day. \par - cont the exercise , to find a new  \par - avoiding the enablers \par - C/w whole foods, plant forward, some fish. \par - May increase to Wegovy 1.7 mg/wk after she finishes these 4 weeks of 1.0 mg/wk\par - F/u in 1-2 mo

## 2023-04-18 NOTE — PHYSICAL EXAM
[Obese, well nourished, in no acute distress] : obese, well nourished, in no acute distress [de-identified] : TEB visit

## 2023-04-23 NOTE — REVIEW OF SYSTEMS
- continue home beta-blocker w/ close monitoring of hemodynamics as above  - holding home AC for now as above [Joint Pain] : joint pain [Anxiety] : anxiety [Negative] : Respiratory

## 2023-05-02 ENCOUNTER — APPOINTMENT (OUTPATIENT)
Dept: BARIATRICS/WEIGHT MGMT | Facility: CLINIC | Age: 58
End: 2023-05-02
Payer: COMMERCIAL

## 2023-05-02 ENCOUNTER — APPOINTMENT (OUTPATIENT)
Dept: BARIATRICS/WEIGHT MGMT | Facility: CLINIC | Age: 58
End: 2023-05-02

## 2023-05-02 VITALS — WEIGHT: 293 LBS | BODY MASS INDEX: 50.02 KG/M2 | HEIGHT: 64 IN

## 2023-05-02 PROCEDURE — 99214 OFFICE O/P EST MOD 30 MIN: CPT | Mod: 95

## 2023-05-02 RX ORDER — OMEGA-3/DHA/EPA/FISH OIL 300-1000MG
400 CAPSULE ORAL
Qty: 30 | Refills: 1 | Status: ACTIVE | COMMUNITY
Start: 2023-05-02 | End: 1900-01-01

## 2023-05-02 NOTE — ASSESSMENT
[FreeTextEntry1] : 57F PMH class III obesity, HTN, HLD, elevated triglycerides, fatty liver, OA knees and LBP, ca-ox crystals in urine, chronic constipation, GERD, hemantioma of liver, generalized anxiety, insomnia, pancreatic cyst, scalp psoriasis, \par who presents to weight management for follow-up.\par \par # Class III obesity c/b HTN, HLD, fatty liver, OA, GERD: Starting Wegovy 1.7 this week. Doing well. No changes as per last visit 2 weeks ago. She has escalated from Wegovy 0.25 mg/wk to curent dose of 1.0 mg/wk, no side effects, feels hunger is increasing. Eating a pescatarean diet, removing 'junk' from house and fried foods from diet. Incorporating fruit and vegetables. Getting more activity walking, is aiming to incorporate a gym class for more intensive exercise without knee pain\par - Food log\par - Meal planning/prep, c/w at least 2 meals per day. \par - cont the exercise , to find a new  \par - avoiding the enablers \par - C/w whole foods, plant forward, some fish. \par - Increase to Wegovy 1.7 mg/wk after she finishes these 4 weeks of 1.0 mg/wk\par - F/u in 1-2 mo. \par \par # Insomnia: in the setting of significant stressors. Otherwise managing. Reporting adverse effects with melatonin and trazadone in the past.\par - C/w chamomile\par - Consider adding Mg Glycinate\par - Will prescribe ambien, trazadone deferred.

## 2023-05-02 NOTE — HISTORY OF PRESENT ILLNESS
[Home] : at home, [unfilled] , at the time of the visit. [Medical Office: ([facility])___] : at the medical office located in  [Verbal consent obtained from patient] : the patient, [unfilled] [FreeTextEntry1] : 57F PMH class III obesity, HTN, HLD, elevated triglycerides, fatty liver, OA knees and LBP, ca-ox crystals in urine, chronic constipation, GERD, hemantioma of liver, generalized anxiety, insomnia, pancreatic cyst, scalp psoriasis, who presents to weight management for follow-up.\par \par She follows with Dr. Mendosa, was last seen by myself 2 weeks ago at which time we increased Wegovy to 1.7 mg/wk which she will first take at her next dose. She has lost another 2 lbs, down to 328 lbs. \par \par Initial Visit (10/6/21): 324 lbs.\par Weight (2/1/23): 345 lbs\par Last Visit (4/18/23): 330 lbs\par Today: 328 lbs, BMI \par \par Since last visit she has gone to 3 friend funerals, and reynaldo feliz (with for 39 years) is in hospital and they don't know why yet. 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 \par Sleep has been poor, <3 hours per night. \par \par She has been on Xanax and taken ambien in the past.\par She had bad rxn to melatonin in the past. Using chamomile w/o effect.\par Believes she took Trazadone in the past but it made her feel off all day.

## 2023-05-02 NOTE — PHYSICAL EXAM
[Obese, well nourished, in no acute distress] : obese, well nourished, in no acute distress [de-identified] : TEB visit

## 2023-05-03 ENCOUNTER — APPOINTMENT (OUTPATIENT)
Dept: BARIATRICS/WEIGHT MGMT | Facility: CLINIC | Age: 58
End: 2023-05-03

## 2023-05-03 ENCOUNTER — APPOINTMENT (OUTPATIENT)
Dept: FAMILY MEDICINE | Facility: CLINIC | Age: 58
End: 2023-05-03
Payer: COMMERCIAL

## 2023-05-03 VITALS
BODY MASS INDEX: 50.02 KG/M2 | TEMPERATURE: 97.3 F | WEIGHT: 293 LBS | OXYGEN SATURATION: 98 % | HEART RATE: 89 BPM | HEIGHT: 64 IN | DIASTOLIC BLOOD PRESSURE: 84 MMHG | SYSTOLIC BLOOD PRESSURE: 136 MMHG

## 2023-05-03 VITALS — SYSTOLIC BLOOD PRESSURE: 128 MMHG | DIASTOLIC BLOOD PRESSURE: 80 MMHG

## 2023-05-03 DIAGNOSIS — J30.9 ALLERGIC RHINITIS, UNSPECIFIED: ICD-10-CM

## 2023-05-03 DIAGNOSIS — R80.9 PROTEINURIA, UNSPECIFIED: ICD-10-CM

## 2023-05-03 DIAGNOSIS — J45.909 UNSPECIFIED ASTHMA, UNCOMPLICATED: ICD-10-CM

## 2023-05-03 DIAGNOSIS — J98.9 RESPIRATORY DISORDER, UNSPECIFIED: ICD-10-CM

## 2023-05-03 PROCEDURE — 99214 OFFICE O/P EST MOD 30 MIN: CPT

## 2023-05-03 NOTE — PHYSICAL EXAM
[No Acute Distress] : no acute distress [Normal Sclera/Conjunctiva] : normal sclera/conjunctiva [No Respiratory Distress] : no respiratory distress  [Clear to Auscultation] : lungs were clear to auscultation bilaterally [Normal Rate] : normal rate  [Normal S1, S2] : normal S1 and S2 [No Murmur] : no murmur heard [No Edema] : there was no peripheral edema [Normal] : affect was normal and insight and judgment were intact [No Focal Deficits] : no focal deficits

## 2023-05-03 NOTE — HISTORY OF PRESENT ILLNESS
[FreeTextEntry1] : Patient c/o persistent anxiety.\par Pt is also following up to review blood work and urine results done at the lab 3 wks ago on 04/14/23.\par Anxiety is daily, persistent, occ panic. Controlled on alprazolam prn.\par Pt is on Rosuvastatin 5 mg/d x 5 months. Lipids improved.\par \par

## 2023-05-03 NOTE — ASSESSMENT
[FreeTextEntry1] : Small amount of proteinuria, may be diet related. Will continue to monitor bp which is normal on repeat BP.\par Cholesterol improved.\par Continue with wt management and low chol diet.\par F/U 2 months.

## 2023-06-01 ENCOUNTER — APPOINTMENT (OUTPATIENT)
Dept: FAMILY MEDICINE | Facility: CLINIC | Age: 58
End: 2023-06-01
Payer: COMMERCIAL

## 2023-06-01 VITALS
HEIGHT: 64 IN | OXYGEN SATURATION: 98 % | DIASTOLIC BLOOD PRESSURE: 80 MMHG | WEIGHT: 293 LBS | HEART RATE: 83 BPM | SYSTOLIC BLOOD PRESSURE: 124 MMHG | BODY MASS INDEX: 50.02 KG/M2

## 2023-06-01 DIAGNOSIS — G89.29 LOW BACK PAIN, UNSPECIFIED: ICD-10-CM

## 2023-06-01 DIAGNOSIS — M54.50 LOW BACK PAIN, UNSPECIFIED: ICD-10-CM

## 2023-06-01 PROCEDURE — 99214 OFFICE O/P EST MOD 30 MIN: CPT

## 2023-06-01 RX ORDER — GUAIFENESIN AND CODEINE PHOSPHATE 10; 100 MG/5ML; MG/5ML
100-10 SOLUTION ORAL
Qty: 300 | Refills: 0 | Status: DISCONTINUED | COMMUNITY
Start: 2022-09-29 | End: 2023-06-01

## 2023-06-01 NOTE — PHYSICAL EXAM
[No Acute Distress] : no acute distress [Normal Sclera/Conjunctiva] : normal sclera/conjunctiva [No Respiratory Distress] : no respiratory distress  [Clear to Auscultation] : lungs were clear to auscultation bilaterally [Normal Rate] : normal rate  [Normal S1, S2] : normal S1 and S2 [No Murmur] : no murmur heard [No Edema] : there was no peripheral edema [No Focal Deficits] : no focal deficits [Normal] : affect was normal and insight and judgment were intact [de-identified] : Mild edema right knee

## 2023-06-01 NOTE — HISTORY OF PRESENT ILLNESS
[FreeTextEntry1] : Pt c/o chronic right knee pain and swelling behind the right knee since TKR.\par To see ortho about this. Interferes with daily activity.\par Anxiety controlled on current regimen.\par To start with new  next week.\par Hitting a plateau with wt loss. Seeing Wt , and on Wegovy. Has made + changes to diet.\par Has chronic, intermittent low back pain often worse after exercise.

## 2023-06-01 NOTE — ASSESSMENT
[FreeTextEntry1] : Pt is stable.\par She is reminded to f/u with GI for re-evaluation of pancreatic cyst.

## 2023-06-07 ENCOUNTER — APPOINTMENT (OUTPATIENT)
Dept: BARIATRICS/WEIGHT MGMT | Facility: CLINIC | Age: 58
End: 2023-06-07

## 2023-06-12 PROBLEM — R82.998 CALCIUM OXALATE CRYSTALS IN URINE: Status: RESOLVED | Noted: 2022-03-08 | Resolved: 2023-06-12

## 2023-06-12 PROBLEM — M25.571 ANKLE PAIN, RIGHT: Status: RESOLVED | Noted: 2021-01-21 | Resolved: 2023-06-12

## 2023-06-12 PROBLEM — Z12.4 CERVICAL CANCER SCREENING: Noted: 2022-09-26

## 2023-06-13 ENCOUNTER — APPOINTMENT (OUTPATIENT)
Dept: BARIATRICS/WEIGHT MGMT | Facility: CLINIC | Age: 58
End: 2023-06-13

## 2023-06-13 ENCOUNTER — APPOINTMENT (OUTPATIENT)
Dept: BARIATRICS/WEIGHT MGMT | Facility: CLINIC | Age: 58
End: 2023-06-13
Payer: COMMERCIAL

## 2023-06-13 VITALS — HEIGHT: 64 IN | BODY MASS INDEX: 50.02 KG/M2 | WEIGHT: 293 LBS

## 2023-06-13 DIAGNOSIS — Z12.4 ENCOUNTER FOR SCREENING FOR MALIGNANT NEOPLASM OF CERVIX: ICD-10-CM

## 2023-06-13 DIAGNOSIS — M25.571 PAIN IN RIGHT ANKLE AND JOINTS OF RIGHT FOOT: ICD-10-CM

## 2023-06-13 DIAGNOSIS — R82.998 OTHER ABNORMAL FINDINGS IN URINE: ICD-10-CM

## 2023-06-13 PROCEDURE — 99214 OFFICE O/P EST MOD 30 MIN: CPT | Mod: 95

## 2023-06-13 NOTE — ASSESSMENT
[FreeTextEntry1] : 57F PMH class III obesity, HTN, HLD, elevated triglycerides, fatty liver, OA knees and LBP, ca-ox crystals in urine, chronic constipation, GERD, hemantioma of liver, generalized anxiety, insomnia, pancreatic cyst, scalp psoriasis, \par who presents to weight management for follow-up.\par \par # Class III obesity c/b HTN, HLD, fatty liver, OA, GERD: 325 lbs, BMI 55.79, lost 3 lbs since last visit 6 weeks ago. Making workout plan with  to go regularly, eating lots of fruits and vegetables, pescatarian diet, goes to farmers market, no snacks or night eating. Still struggles with sleep. Doing well on Wegovy 1.7 mg/wk without side effects. \par - Food log\par - Meal planning/prep, c/w at least 2 meals per day. \par - cont the exercise, developing training plan.\par - avoiding the enablers \par - C/w whole foods, plant forward, some fish. \par - Increase to Wegovy 2.4 mg/wk \par - F/u in 1-2 mo. \par \par # Insomnia: in the setting of significant stressors. Otherwise managing. Reporting adverse effects with melatonin and trazadone in the past. On and off Ambien for 15 years. Felt 10 mg wasn't that strong for her anymore. \par - C/w chamomile\par - Consider adding Mg Glycinate\par - Will increase ambien 12.5 mg/wk, trazadone deferred. \par

## 2023-06-13 NOTE — HISTORY OF PRESENT ILLNESS
[Home] : at home, [unfilled] , at the time of the visit. [Medical Office: ([facility])___] : at the medical office located in  [Verbal consent obtained from patient] : the patient, [unfilled] [FreeTextEntry1] : 57F PMH class III obesity, HTN, HLD, elevated triglycerides, fatty liver, OA knees and LBP, ca-ox crystals in urine, chronic constipation, GERD, hemangioma of liver, generalized anxiety, insomnia, pancreatic cyst, scalp psoriasis, who presents to weight management for follow-up.\par \par She follows with Dr. Mendosa, since 10/6/21, was initially seen by myself 4/18/21, and last seen by myself ~6 weeks ago at which time we increased Wegovy to 1.7 mg/wk.\par \par Initial Visit (10/6/21): 324 lbs.\par 2/1/23: 345 lbs\par 4/18/23: 330 lbs\par 5/2/23: 328 lbs, BMI \par Weight today: 325 lbs, BMI 55.79\par \par Starting to make a schedule to work out with her .\par Describes herself as a workaholic, needs a schedule to follow.\par Her fiancee is out of the hospital, she is doing better.\par \par Eating more fruits and vegetables, writing things down, not craving sweets. Goes to [employer] for veggies.\par \par Last visit was sleeping <3 hours per night. Been taking the ambien but feels it is only partially working.\par \par She has been on Xanax and taken ambien in the past.\par She had bad rxn to melatonin in the past. Using chamomile w/o effect.\par Believes she took Trazadone in the past but it made her feel off all day.

## 2023-06-13 NOTE — PHYSICAL EXAM
[Obese, well nourished, in no acute distress] : obese, well nourished, in no acute distress [de-identified] : TEB visit

## 2023-06-14 ENCOUNTER — APPOINTMENT (OUTPATIENT)
Dept: CARDIOLOGY | Facility: CLINIC | Age: 58
End: 2023-06-14
Payer: COMMERCIAL

## 2023-06-14 VITALS
BODY MASS INDEX: 50.02 KG/M2 | RESPIRATION RATE: 16 BRPM | WEIGHT: 293 LBS | SYSTOLIC BLOOD PRESSURE: 131 MMHG | OXYGEN SATURATION: 93 % | HEIGHT: 64 IN | DIASTOLIC BLOOD PRESSURE: 84 MMHG | HEART RATE: 85 BPM

## 2023-06-14 DIAGNOSIS — Z86.79 PERSONAL HISTORY OF OTHER DISEASES OF THE CIRCULATORY SYSTEM: ICD-10-CM

## 2023-06-14 DIAGNOSIS — R94.31 ABNORMAL ELECTROCARDIOGRAM [ECG] [EKG]: ICD-10-CM

## 2023-06-14 PROCEDURE — 93000 ELECTROCARDIOGRAM COMPLETE: CPT

## 2023-06-14 PROCEDURE — 99214 OFFICE O/P EST MOD 30 MIN: CPT | Mod: 25

## 2023-06-14 RX ORDER — RIFAXIMIN 550 MG/1
550 TABLET ORAL
Qty: 42 | Refills: 0 | Status: DISCONTINUED | COMMUNITY
Start: 2022-07-15 | End: 2023-06-14

## 2023-06-14 RX ORDER — NYSTATIN AND TRIAMCINOLONE ACETONIDE 100000; 1 MG/G; MG/G
100000-0.1 CREAM TOPICAL TWICE DAILY
Qty: 1 | Refills: 0 | Status: DISCONTINUED | COMMUNITY
Start: 2021-09-01 | End: 2023-06-14

## 2023-06-14 RX ORDER — TRIAMTERENE AND HYDROCHLOROTHIAZIDE 37.5; 25 MG/1; MG/1
37.5-25 CAPSULE ORAL
Qty: 90 | Refills: 1 | Status: DISCONTINUED | COMMUNITY
Start: 2020-01-16 | End: 2023-06-14

## 2023-06-14 RX ORDER — BUDESONIDE 32 UG/1
32 SPRAY, METERED NASAL DAILY
Qty: 1 | Refills: 1 | Status: DISCONTINUED | COMMUNITY
Start: 2022-06-01 | End: 2023-06-14

## 2023-06-14 NOTE — ASSESSMENT
[FreeTextEntry1] : ECG-normal sinus rhythm at 85 bpm.  Low voltage QRS in the limb leads.  Nonspecific T wave normalities.\par \par \par Lab data\par --------11/9/20--4/17/23\par Chol.---229-------196\par HDL-----71--------72\par LDL----141-------104\par Tri-------83-------106\par HGB 12.6\par A1C 5.3\par Chol. \par Creat 0.77\par \par Impression\par \par 55 year old obese female s/p  R TKR on 3/3/21. Denies personal hx of CAD, DM, HTN or HLD. No family history of coronary disease. Former patient of Dr. magallanes/ cardio \par \par -Hyperlipidemia demonstrating significant improvement with the use of low-dose rosuvastatin.\par -  cardiac symptoms are GAFFNEY\par - ECG low voltage and nonspecific T wave abnormalities. BP WNL.\par - BMI 56, states at her lowest she was 280 lbs. previously was seeking bariatric sx eval. for possible sleeve. \par -Tolerated L TKR in 2015.\par -Post- menopausal for 5 years. \par \par \par Plan\par 1.  Before beginning regular exercise would like to see her ambulate on a treadmill and do modified Vern exercise stress test.\par \par 2.  Instructed the patient about the benefits of a diet that restricts portion sizes, increases frequency of meals and consists of  vegetables, (more green and leafy),fruit and nuts, whole grains, lean proteins and limits carbohydrates and meat and dairy fats instructed the patient about the benefits of a diet that restricts portion sizes, increases frequency of meals and consists of  vegetables, (more green and leafy),fruit and nuts, whole grains, lean proteins and limits carbohydrates and meat and dairy fats\par \par 3. BW through PCP\par \par \par \par \par

## 2023-06-14 NOTE — PHYSICAL EXAM
[Normal Conjunctiva] : the conjunctiva exhibited no abnormalities [Normal Oral Mucosa] : normal oral mucosa [No Oral Pallor] : no oral pallor [No Oral Cyanosis] : no oral cyanosis [Normal Oropharynx] : normal oropharynx [Normal Jugular Venous A Waves Present] : normal jugular venous A waves present [Normal Jugular Venous V Waves Present] : normal jugular venous V waves present [No Jugular Venous Severino A Waves] : no jugular venous severino A waves [Heart Rate And Rhythm] : heart rate and rhythm were normal [Heart Sounds] : normal S1 and S2 [Murmurs] : no murmurs present [Arterial Pulses Normal] : the arterial pulses were normal [Veins - Varicosity Changes] : no varicosital changes were noted in the lower extremities [Respiration, Rhythm And Depth] : normal respiratory rhythm and effort [Exaggerated Use Of Accessory Muscles For Inspiration] : no accessory muscle use [Auscultation Breath Sounds / Voice Sounds] : lungs were clear to auscultation bilaterally [Chest Palpation] : palpation of the chest revealed no abnormalities [Lungs Percussion] : the lungs were normal to percussion [Bowel Sounds] : normal bowel sounds [Abdomen Tenderness] : non-tender [Abdomen Mass (___ Cm)] : no abdominal mass palpated [Abdomen Hernia] : no hernia was discovered [Abnormal Walk] : normal gait [Nail Clubbing] : no clubbing of the fingernails [Cyanosis, Localized] : no localized cyanosis [Petechial Hemorrhages (___cm)] : no petechial hemorrhages [Nail Splinter Hemorrhages] : no splinter hemorrhages of the nails [] : no ischemic changes [Fingers Osler's Nodes] : Osler's nodes were not seenon the fingers [Skin Color & Pigmentation] : normal skin color and pigmentation [Skin Turgor] : normal skin turgor [No Venous Stasis] : no venous stasis [Skin Lesions] : no skin lesions [No Skin Ulcers] : no skin ulcer [No Xanthoma] : no  xanthoma was observed [Oriented To Time, Place, And Person] : oriented to person, place, and time

## 2023-06-14 NOTE — REASON FOR VISIT
[FreeTextEntry1] : 57  year old female presenting for cardiac evaluation, now status post right total knee replacement.\par \par Right knee replacement on 3/3/21 with Dr. Atkinson at [facility].\par  L TKR done in 2015 w/o any cardiac events. \par \par Pt states she had elevated blood pressures during a period in her life when she traveled excessively otherwise not \par \par Denies HLD although BW from 11/9/2020 revealed a total cholesterol of 229 and a LDL of 141. Currently she is only taking OTC fish oil.\par \par Family hx includes only cancers in both parents. Denies CAD, DM, CVAs or HTN.\par No smoking or diabetes history.

## 2023-06-14 NOTE — HISTORY OF PRESENT ILLNESS
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 States she recently completed a HST but the details are not available yet. This was ordered by a bariatric surgeon who she is seeking consultation in regard to a gastric sleeve .\par \par Not having any chest pain, palps or orthopnea.  But overall activity level is, however are quite limited

## 2023-06-21 ENCOUNTER — TRANSCRIPTION ENCOUNTER (OUTPATIENT)
Age: 58
End: 2023-06-21

## 2023-06-22 ENCOUNTER — TRANSCRIPTION ENCOUNTER (OUTPATIENT)
Age: 58
End: 2023-06-22

## 2023-06-29 ENCOUNTER — RX RENEWAL (OUTPATIENT)
Age: 58
End: 2023-06-29

## 2023-07-05 ENCOUNTER — APPOINTMENT (OUTPATIENT)
Dept: CARDIOLOGY | Facility: CLINIC | Age: 58
End: 2023-07-05
Payer: COMMERCIAL

## 2023-07-05 ENCOUNTER — APPOINTMENT (OUTPATIENT)
Dept: BARIATRICS/WEIGHT MGMT | Facility: CLINIC | Age: 58
End: 2023-07-05

## 2023-07-05 PROCEDURE — 93015 CV STRESS TEST SUPVJ I&R: CPT

## 2023-07-10 ENCOUNTER — APPOINTMENT (OUTPATIENT)
Dept: FAMILY MEDICINE | Facility: CLINIC | Age: 58
End: 2023-07-10
Payer: COMMERCIAL

## 2023-07-10 VITALS
OXYGEN SATURATION: 96 % | TEMPERATURE: 97.5 F | SYSTOLIC BLOOD PRESSURE: 124 MMHG | HEART RATE: 100 BPM | DIASTOLIC BLOOD PRESSURE: 80 MMHG | HEIGHT: 64 IN | RESPIRATION RATE: 16 BRPM

## 2023-07-10 DIAGNOSIS — M17.11 UNILATERAL PRIMARY OSTEOARTHRITIS, RIGHT KNEE: ICD-10-CM

## 2023-07-10 PROCEDURE — 99214 OFFICE O/P EST MOD 30 MIN: CPT

## 2023-07-10 RX ORDER — UBIQUINOL 100 MG
CAPSULE ORAL
Refills: 0 | Status: DISCONTINUED | COMMUNITY
End: 2023-07-10

## 2023-07-10 RX ORDER — COLD-HOT PACK
50 EACH MISCELLANEOUS DAILY
Refills: 0 | Status: ACTIVE | COMMUNITY
Start: 2023-07-10

## 2023-07-10 RX ORDER — CHLORHEXIDINE GLUCONATE 4 %
1000 LIQUID (ML) TOPICAL DAILY
Refills: 0 | Status: ACTIVE | COMMUNITY
Start: 2023-07-10

## 2023-07-10 RX ORDER — CYANOCOBALAMIN (VITAMIN B-12) 500 MCG
400 LOZENGE ORAL DAILY
Refills: 0 | Status: ACTIVE | COMMUNITY
Start: 2023-07-10

## 2023-07-10 RX ORDER — MULTIVIT-MIN/IRON/FOLIC ACID/K 18-600-40
500 CAPSULE ORAL DAILY
Refills: 0 | Status: ACTIVE | COMMUNITY
Start: 2023-07-10

## 2023-07-10 RX ORDER — FLUNISOLIDE 0.25 MG/ML
25 MCG/ACT SOLUTION NASAL DAILY
Qty: 1 | Refills: 2 | Status: DISCONTINUED | COMMUNITY
Start: 2022-05-10 | End: 2023-07-10

## 2023-07-11 NOTE — PHYSICAL EXAM
[No Acute Distress] : no acute distress [Normal Sclera/Conjunctiva] : normal sclera/conjunctiva [No Respiratory Distress] : no respiratory distress  [Clear to Auscultation] : lungs were clear to auscultation bilaterally [Normal Rate] : normal rate  [Normal S1, S2] : normal S1 and S2 [No Murmur] : no murmur heard [No Edema] : there was no peripheral edema [No Focal Deficits] : no focal deficits [Normal] : affect was normal and insight and judgment were intact

## 2023-07-11 NOTE — HISTORY OF PRESENT ILLNESS
[Other: _____] : [unfilled] [FreeTextEntry1] : Patient c/o persistent anxiety.\par Symptoms overall controlled on current regimen.\par c/o hot flushes, intermittent.\par Had recent negative cardiac stress test.\par Right knee still hurting. She continues to exercise and also follow with wt management/Wegovy tx.\par Plans to see GI in August to f/u on pancreatic cyst.\par \par \par

## 2023-07-20 NOTE — PHYSICAL EXAM
[Normal Sclera/Conjunctiva] : normal sclera/conjunctiva [Normal TMs] : both tympanic membranes were normal [No Respiratory Distress] : no respiratory distress  [Clear to Auscultation] : lungs were clear to auscultation bilaterally [No Lymphadenopathy] : no lymphadenopathy [Normal Rate] : normal rate  [Normal S1, S2] : normal S1 and S2 [Regular Rhythm] : with a regular rhythm [No Murmur] : no murmur heard [No Edema] : there was no peripheral edema [Grossly Normal Strength/Tone] : grossly normal strength/tone [No Rash] : no rash [Normal Mood] : the mood was normal [No Focal Deficits] : no focal deficits [Normal Affect] : the affect was normal [de-identified] : pharynx + erythema.  Nasal turbinates are erythematous with mild edema. Clear nasal secretions [de-identified] : Mild paralumbar tenderness with palptation [Normal] : affect was normal and insight and judgment were intact Tremfya Counseling: I discussed with the patient the risks of guselkumab including but not limited to immunosuppression, serious infections, and drug reactions.  The patient understands that monitoring is required including a PPD at baseline and must alert us or the primary physician if symptoms of infection or other concerning signs are noted.

## 2023-07-21 ENCOUNTER — NON-APPOINTMENT (OUTPATIENT)
Age: 58
End: 2023-07-21

## 2023-07-21 ENCOUNTER — TRANSCRIPTION ENCOUNTER (OUTPATIENT)
Age: 58
End: 2023-07-21

## 2023-07-28 ENCOUNTER — APPOINTMENT (OUTPATIENT)
Dept: BARIATRICS/WEIGHT MGMT | Facility: CLINIC | Age: 58
End: 2023-07-28
Payer: COMMERCIAL

## 2023-07-28 PROCEDURE — 99214 OFFICE O/P EST MOD 30 MIN: CPT | Mod: 95

## 2023-07-31 NOTE — HISTORY OF PRESENT ILLNESS
[Home] : at home, [unfilled] , at the time of the visit. [Medical Office: ([facility])___] : at the medical office located in  [Verbal consent obtained from patient] : the patient, [unfilled] [FreeTextEntry1] : 58F PMH class III obesity, HTN, HLD, elevated triglycerides, fatty liver, OA knees and LBP, ca-ox crystals in urine, chronic constipation, GERD, hemangioma of liver, generalized anxiety, insomnia, pancreatic cyst, scalp psoriasis, who presents to weight management for follow-up.  She follows with Dr. Mendosa, since 10/6/21, was initially seen by myself 4/18/21, and last seen by myself 6/13/23.  Has been on Wegovy 2.4 mg/wk, had a lot of constipation and some nausea but improving with more fruit.   Initial Visit (10/6/21): 324 lbs. 2/1/23: 345 lbs 4/18/23: 330 lbs 5/2/23: 328 lbs, BMI  6/13/23: 325 lbs, BMI 55.79 Weight today: 325 lbs  Has been kickboxing 3x/wk.  Uses treadmill and walks dog.   Eating more fruit to manage her constipation. Pescatarian. Veggies, some potato.  Sleep is improved, on Ambien 12.5.

## 2023-07-31 NOTE — ASSESSMENT
[FreeTextEntry1] : 58F PMH class III obesity, HTN, HLD, elevated triglycerides, fatty liver, OA knees and LBP, ca-ox crystals in urine, chronic constipation, GERD, hemantioma of liver, generalized anxiety, insomnia, pancreatic cyst, scalp psoriasis,  who presents to weight management for follow-up.  # Class III obesity c/b HTN, HLD, fatty liver, OA, GERD: Weight today 325 lbs, weight stable. Has been exercising more, trying to eat healthier diet, pescatarian, more fruit. Has been kickboxing 3x/wk. Using treadmill and walking dog. Managed constipation. Sleep managed on increased dose of Ambien. Doing well on Wegovy 2.4 mg/wk - Food log - Meal planning/prep, c/w at least 2 meals per day.  - cont the exercise, developing training plan. - avoiding the enablers  - C/w whole foods, plant forward, some fish.  - C/w Wegovy 2.4 mg/wk  - F/u in 2 mo.   # Insomnia: in the setting of significant stressors. Otherwise managing. Reporting adverse effects with melatonin and trazadone in the past. On and off Ambien for 15 years. Felt 10 mg wasn't that strong for her anymore.  - C/w chamomile - Consider adding Mg Glycinate - C/w ambien 12.5 mg/wk, trazadone deferred.

## 2023-07-31 NOTE — PHYSICAL EXAM
[Obese, well nourished, in no acute distress] : obese, well nourished, in no acute distress [de-identified] : TEB visit

## 2023-08-01 ENCOUNTER — APPOINTMENT (OUTPATIENT)
Dept: BARIATRICS/WEIGHT MGMT | Facility: CLINIC | Age: 58
End: 2023-08-01

## 2023-08-02 ENCOUNTER — APPOINTMENT (OUTPATIENT)
Dept: BARIATRICS/WEIGHT MGMT | Facility: CLINIC | Age: 58
End: 2023-08-02

## 2023-08-04 ENCOUNTER — APPOINTMENT (OUTPATIENT)
Dept: FAMILY MEDICINE | Facility: CLINIC | Age: 58
End: 2023-08-04
Payer: COMMERCIAL

## 2023-08-04 VITALS
HEIGHT: 64 IN | HEART RATE: 101 BPM | DIASTOLIC BLOOD PRESSURE: 84 MMHG | BODY MASS INDEX: 50.02 KG/M2 | TEMPERATURE: 97 F | WEIGHT: 293 LBS | SYSTOLIC BLOOD PRESSURE: 126 MMHG | OXYGEN SATURATION: 95 %

## 2023-08-04 PROCEDURE — 99214 OFFICE O/P EST MOD 30 MIN: CPT

## 2023-08-04 NOTE — PLAN
[FreeTextEntry1] : Pt encounter incorporated clinical review of the medical record including consultation from specialists, review of lab and diagnostic testing, general pt counseling, and coordination of care, as well as documentation update within the electronic medical record.  Time spent: 30 mins.

## 2023-08-04 NOTE — ASSESSMENT
[FreeTextEntry1] : Patient advised to follow-up with her orthopedist regarding her chronic right knee pain to discuss further options are. Continue with current medication regimen. Follow-up 1 month.

## 2023-08-04 NOTE — HISTORY OF PRESENT ILLNESS
[FreeTextEntry1] : Patient c/o sinus infection. On Augmentin x 1 week and feeling better. Less nasal congestion. c/o persistent anxiety. Doing well on current regimen. Pt c/o chronic right knee pain. Pain is worse behind the knee. She notes that it is chronically swollen with edema of the RLE > LLE. She is frustrated with the chronic right knee pain since it has been 2 yrs since her right TKR. Patient c/o persistent hot flashes noted since Covid in November 2022. Her face gets red and she sweats a lot during a hot flush. Saw cardio and had negative stress test 1 month ago. She is due to see GI in 2 wks to f/u on her pancreatic cyst. She continues to follow with OM. On Wegovy.

## 2023-08-04 NOTE — PHYSICAL EXAM
[No Acute Distress] : no acute distress [Normal Sclera/Conjunctiva] : normal sclera/conjunctiva [No Respiratory Distress] : no respiratory distress  [Clear to Auscultation] : lungs were clear to auscultation bilaterally [Normal Rate] : normal rate  [Normal S1, S2] : normal S1 and S2 [No Murmur] : no murmur heard [No Focal Deficits] : no focal deficits [Normal] : affect was normal and insight and judgment were intact [de-identified] : Lymphedema b/l LEs R>L. No pitting edema. [de-identified] : Mild tenderness posterior right knee.

## 2023-08-15 ENCOUNTER — NON-APPOINTMENT (OUTPATIENT)
Age: 58
End: 2023-08-15

## 2023-08-16 ENCOUNTER — APPOINTMENT (OUTPATIENT)
Dept: CARDIOLOGY | Facility: CLINIC | Age: 58
End: 2023-08-16
Payer: COMMERCIAL

## 2023-08-16 VITALS
WEIGHT: 293 LBS | SYSTOLIC BLOOD PRESSURE: 135 MMHG | DIASTOLIC BLOOD PRESSURE: 89 MMHG | RESPIRATION RATE: 16 BRPM | HEART RATE: 88 BPM | HEIGHT: 64 IN | BODY MASS INDEX: 50.02 KG/M2 | OXYGEN SATURATION: 95 %

## 2023-08-16 DIAGNOSIS — R60.0 LOCALIZED EDEMA: ICD-10-CM

## 2023-08-16 DIAGNOSIS — E66.9 OBESITY, UNSPECIFIED: ICD-10-CM

## 2023-08-16 PROCEDURE — 93000 ELECTROCARDIOGRAM COMPLETE: CPT

## 2023-08-16 PROCEDURE — 99214 OFFICE O/P EST MOD 30 MIN: CPT | Mod: 25

## 2023-08-16 NOTE — REASON FOR VISIT
[FreeTextEntry1] : 58  year old female seen in cardiac follow-up after stress testing  Right knee replacement on 3/3/21 with Dr. Atkinson at [facility].  L TKR done in 2015 w/o any cardiac events.   Pt states she had elevated blood pressures during a period in her life when she traveled excessively otherwise not   Denies HLD although BW from 11/9/2020 revealed a total cholesterol of 229 and a LDL of 141. Currently she is only taking OTC fish oil.  Family hx includes only cancers in both parents. Denies CAD, DM, CVAs or HTN. No smoking or diabetes history.

## 2023-08-16 NOTE — ASSESSMENT
[FreeTextEntry1] : ECG-normal sinus rhythm at 85 bpm.  Low voltage QRS in the limb leads.  Nonspecific T wave normalities.   Lab data --------11/9/20--4/17/23 Chol.---229-------196 HDL-----71--------72 LDL----141-------104 Tri-------83-------106 HGB 12.6 A1C 5.3 Chol.  Creat 0.77  Stress test (modified Vern protocol) 7/5/2023: Time: 6 minutes 10 seconds (4 METS) Heart rate: 157   ----98% Blood pressure: Normal response 176/90 mmHg ECG: Negative for ischemia Symptoms of shortness of breath  Impression  55 year old obese female s/p  R TKR on 3/3/21. Denies personal hx of CAD, DM, HTN or HLD. No family history of coronary disease. Former patient of Dr. magallanes/ cardio   -  Hyperlipidemia demonstrating significant improvement with the use of low-dose rosuvastatin. -  cardiac symptoms are GAFFNEY - ECG low voltage and nonspecific T wave abnormalities. BP WNL. - BMI 56, states at her lowest she was 280 lbs. previously was seeking bariatric sx eval. for possible sleeve.  -Tolerated L TKR in 2015. -Post- menopausal for 5 years.  -An overall functional capacity which is a useful baseline as to monitor the patient's progress going forward.  Plan 1.   Instructed the patient about the benefits of a diet that restricts portion sizes, increases frequency of meals and consists of  vegetables, (more green and leafy),fruit and nuts, whole grains, lean proteins and limits carbohydrates and meat and dairy fats instructed the patient about the benefits of a diet that restricts portion sizes, increases frequency of meals and consists of  vegetables, (more green and leafy),fruit and nuts, whole grains, lean proteins and limits carbohydrates and meat and dairy fats  2.. BW through PCP  3.  The patient was instructed to follow a symptom limited regimen of moderate aerobic exercise for 30 minutes 3 to 4 days a week. A warm up and cool down period are to be added to the regimen and small incremental changes can be made every few weeks. Any new symptoms of exercise related chest pain or dyspnea should be reported.

## 2023-08-16 NOTE — PHYSICAL EXAM
[Normal Conjunctiva] : the conjunctiva exhibited no abnormalities [Normal Oral Mucosa] : normal oral mucosa [No Oral Pallor] : no oral pallor [No Oral Cyanosis] : no oral cyanosis [Normal Oropharynx] : normal oropharynx [Normal Jugular Venous A Waves Present] : normal jugular venous A waves present [Normal Jugular Venous V Waves Present] : normal jugular venous V waves present [No Jugular Venous Severino A Waves] : no jugular venous severino A waves [Heart Rate And Rhythm] : heart rate and rhythm were normal [Heart Sounds] : normal S1 and S2 [Murmurs] : no murmurs present [Arterial Pulses Normal] : the arterial pulses were normal [Veins - Varicosity Changes] : no varicosital changes were noted in the lower extremities [Respiration, Rhythm And Depth] : normal respiratory rhythm and effort [Exaggerated Use Of Accessory Muscles For Inspiration] : no accessory muscle use [Auscultation Breath Sounds / Voice Sounds] : lungs were clear to auscultation bilaterally [Chest Palpation] : palpation of the chest revealed no abnormalities [Lungs Percussion] : the lungs were normal to percussion [Bowel Sounds] : normal bowel sounds [Abdomen Tenderness] : non-tender [Abdomen Mass (___ Cm)] : no abdominal mass palpated [Abdomen Hernia] : no hernia was discovered [Nail Clubbing] : no clubbing of the fingernails [Cyanosis, Localized] : no localized cyanosis [Petechial Hemorrhages (___cm)] : no petechial hemorrhages [Nail Splinter Hemorrhages] : no splinter hemorrhages of the nails [] : no ischemic changes [Fingers Osler's Nodes] : Osler's nodes were not seenon the fingers [Skin Color & Pigmentation] : normal skin color and pigmentation [Skin Turgor] : normal skin turgor [No Venous Stasis] : no venous stasis [Skin Lesions] : no skin lesions [No Skin Ulcers] : no skin ulcer [No Xanthoma] : no  xanthoma was observed [Oriented To Time, Place, And Person] : oriented to person, place, and time

## 2023-08-16 NOTE — HISTORY OF PRESENT ILLNESS
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 States she recently completed a HST but the details are not available yet. This was ordered by a bariatric surgeon who she is seeking consultation in regard to a gastric sleeve .\par  \par  Not having any chest pain, palps or orthopnea.  But overall activity level is, however are quite limited

## 2023-08-24 ENCOUNTER — RX RENEWAL (OUTPATIENT)
Age: 58
End: 2023-08-24

## 2023-08-24 RX ORDER — MELOXICAM 15 MG/1
15 TABLET ORAL DAILY
Qty: 90 | Refills: 0 | Status: ACTIVE | COMMUNITY
Start: 2023-02-03 | End: 1900-01-01

## 2023-09-05 ENCOUNTER — RX RENEWAL (OUTPATIENT)
Age: 58
End: 2023-09-05

## 2023-09-05 ENCOUNTER — APPOINTMENT (OUTPATIENT)
Dept: BARIATRICS/WEIGHT MGMT | Facility: CLINIC | Age: 58
End: 2023-09-05

## 2023-09-05 NOTE — HISTORY OF PRESENT ILLNESS
[Home] : at home, [unfilled] , at the time of the visit. 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  She follows with Dr. Mendosa, since 10/6/21, was initially seen by myself 4/18/21, and last seen by myself 7/28/23.  (Has been on Wegovy 2.4 mg/wk, had a lot of constipation and some nausea but improving with more fruit.   Initial Visit (10/6/21): 324 lbs. 2/1/23: 345 lbs 4/18/23: 330 lbs 5/2/23: 328 lbs, BMI  6/13/23: 325 lbs, BMI 55.79 7/28/23: 325 lbs Weight today:   (Has been kickboxing 3x/wk.  Uses treadmill and walks dog.   (Eating more fruit to manage her constipation. Pescatarian. Veggies, some potato.  (Sleep is improved, on Ambien 12.5.

## 2023-09-05 NOTE — PHYSICAL EXAM
[Obese, well nourished, in no acute distress] : obese, well nourished, in no acute distress [de-identified] : TEB visit

## 2023-09-06 ENCOUNTER — TRANSCRIPTION ENCOUNTER (OUTPATIENT)
Age: 58
End: 2023-09-06

## 2023-09-06 ENCOUNTER — APPOINTMENT (OUTPATIENT)
Dept: FAMILY MEDICINE | Facility: CLINIC | Age: 58
End: 2023-09-06
Payer: COMMERCIAL

## 2023-09-06 ENCOUNTER — APPOINTMENT (OUTPATIENT)
Dept: BARIATRICS/WEIGHT MGMT | Facility: CLINIC | Age: 58
End: 2023-09-06

## 2023-09-06 VITALS
DIASTOLIC BLOOD PRESSURE: 84 MMHG | HEART RATE: 88 BPM | SYSTOLIC BLOOD PRESSURE: 130 MMHG | OXYGEN SATURATION: 96 % | HEIGHT: 64 IN

## 2023-09-06 DIAGNOSIS — R10.13 EPIGASTRIC PAIN: ICD-10-CM

## 2023-09-06 DIAGNOSIS — Z87.898 PERSONAL HISTORY OF OTHER SPECIFIED CONDITIONS: ICD-10-CM

## 2023-09-06 DIAGNOSIS — G89.29 EPIGASTRIC PAIN: ICD-10-CM

## 2023-09-06 DIAGNOSIS — Z87.09 PERSONAL HISTORY OF OTHER DISEASES OF THE RESPIRATORY SYSTEM: ICD-10-CM

## 2023-09-06 PROCEDURE — 99214 OFFICE O/P EST MOD 30 MIN: CPT

## 2023-09-06 RX ORDER — AMOXICILLIN 500 MG/1
500 CAPSULE ORAL
Refills: 0 | Status: DISCONTINUED | COMMUNITY
Start: 2023-07-10 | End: 2023-09-06

## 2023-09-06 NOTE — PHYSICAL EXAM
[No Acute Distress] : no acute distress [Normal Sclera/Conjunctiva] : normal sclera/conjunctiva [No Respiratory Distress] : no respiratory distress  [Clear to Auscultation] : lungs were clear to auscultation bilaterally [Normal Rate] : normal rate  [Normal S1, S2] : normal S1 and S2 [No Murmur] : no murmur heard [Soft] : abdomen soft [Non Tender] : non-tender [Non-distended] : non-distended [Normal Bowel Sounds] : normal bowel sounds [No Focal Deficits] : no focal deficits [Normal] : affect was normal and insight and judgment were intact [de-identified] : Lymphedema b/l LEs R>L. No pitting edema.

## 2023-09-06 NOTE — PLAN
[FreeTextEntry1] : Pt encounter incorporated clinical review of the medical record including consultation from specialists, review of lab and diagnostic testing with interpretation and discussion of results with patient, general pt counseling, and coordination of care, as well as documentation update within the electronic medical record.  Time spent: 30 mins.

## 2023-09-06 NOTE — ASSESSMENT
[FreeTextEntry1] : Alprazolam renewed. Pt stable.  Cont with GI and wt management. BW pending. Pt states she will go soon. Will retrieve copy of recent abdominal imaging. F/U 1 month.

## 2023-09-06 NOTE — HISTORY OF PRESENT ILLNESS
[FreeTextEntry1] : c/o epigastric pain x months, worse after eating. Had MRI of the abdomen to check on pancreatic cyst. Results not available for review. Due for upper endoscopy in 2 wks. Is also scheduled for pap and mammo. Saw Derm and had total body skin exam. Anxiety is chronic, and adequate control on current regimen.

## 2023-09-09 ENCOUNTER — APPOINTMENT (OUTPATIENT)
Dept: MAMMOGRAPHY | Facility: CLINIC | Age: 58
End: 2023-09-09

## 2023-09-18 ENCOUNTER — APPOINTMENT (OUTPATIENT)
Dept: FAMILY MEDICINE | Facility: CLINIC | Age: 58
End: 2023-09-18

## 2023-09-20 ENCOUNTER — TRANSCRIPTION ENCOUNTER (OUTPATIENT)
Age: 58
End: 2023-09-20

## 2023-09-30 ENCOUNTER — APPOINTMENT (OUTPATIENT)
Dept: OBGYN | Facility: CLINIC | Age: 58
End: 2023-09-30
Payer: COMMERCIAL

## 2023-09-30 VITALS
WEIGHT: 293 LBS | HEIGHT: 64 IN | SYSTOLIC BLOOD PRESSURE: 125 MMHG | BODY MASS INDEX: 50.02 KG/M2 | DIASTOLIC BLOOD PRESSURE: 72 MMHG

## 2023-09-30 DIAGNOSIS — Z12.4 ENCOUNTER FOR SCREENING FOR MALIGNANT NEOPLASM OF CERVIX: ICD-10-CM

## 2023-09-30 DIAGNOSIS — Z12.39 ENCOUNTER FOR OTHER SCREENING FOR MALIGNANT NEOPLASM OF BREAST: ICD-10-CM

## 2023-09-30 PROCEDURE — 99396 PREV VISIT EST AGE 40-64: CPT

## 2023-10-02 ENCOUNTER — APPOINTMENT (OUTPATIENT)
Dept: FAMILY MEDICINE | Facility: CLINIC | Age: 58
End: 2023-10-02
Payer: COMMERCIAL

## 2023-10-02 VITALS
HEIGHT: 64 IN | HEART RATE: 83 BPM | TEMPERATURE: 97.3 F | DIASTOLIC BLOOD PRESSURE: 82 MMHG | OXYGEN SATURATION: 98 % | SYSTOLIC BLOOD PRESSURE: 134 MMHG

## 2023-10-02 DIAGNOSIS — I10 ESSENTIAL (PRIMARY) HYPERTENSION: ICD-10-CM

## 2023-10-02 DIAGNOSIS — K64.0 FIRST DEGREE HEMORRHOIDS: ICD-10-CM

## 2023-10-02 DIAGNOSIS — K31.7 POLYP OF STOMACH AND DUODENUM: ICD-10-CM

## 2023-10-02 PROCEDURE — 96372 THER/PROPH/DIAG INJ SC/IM: CPT

## 2023-10-02 PROCEDURE — 99214 OFFICE O/P EST MOD 30 MIN: CPT | Mod: 25

## 2023-10-02 RX ORDER — CYANOCOBALAMIN 1000 UG/ML
1000 INJECTION INTRAMUSCULAR; SUBCUTANEOUS
Qty: 0 | Refills: 0 | Status: COMPLETED | OUTPATIENT
Start: 2023-10-02

## 2023-10-02 RX ADMIN — CYANOCOBALAMIN 1 MCG/ML: 1000 INJECTION INTRAMUSCULAR; SUBCUTANEOUS at 00:00

## 2023-10-03 ENCOUNTER — APPOINTMENT (OUTPATIENT)
Dept: BARIATRICS/WEIGHT MGMT | Facility: CLINIC | Age: 58
End: 2023-10-03

## 2023-10-04 ENCOUNTER — APPOINTMENT (OUTPATIENT)
Dept: BARIATRICS/WEIGHT MGMT | Facility: CLINIC | Age: 58
End: 2023-10-04

## 2023-10-07 LAB
CYTOLOGY CVX/VAG DOC THIN PREP: NORMAL
HPV HIGH+LOW RISK DNA PNL CVX: NOT DETECTED

## 2023-10-09 ENCOUNTER — TRANSCRIPTION ENCOUNTER (OUTPATIENT)
Age: 58
End: 2023-10-09

## 2023-10-09 ENCOUNTER — RX RENEWAL (OUTPATIENT)
Age: 58
End: 2023-10-09

## 2023-10-16 ENCOUNTER — TRANSCRIPTION ENCOUNTER (OUTPATIENT)
Age: 58
End: 2023-10-16

## 2023-10-20 ENCOUNTER — TRANSCRIPTION ENCOUNTER (OUTPATIENT)
Age: 58
End: 2023-10-20

## 2023-10-22 PROBLEM — E78.1 HYPERTRIGLYCERIDEMIA: Status: ACTIVE | Noted: 2017-02-17

## 2023-10-23 ENCOUNTER — APPOINTMENT (OUTPATIENT)
Dept: BARIATRICS/WEIGHT MGMT | Facility: CLINIC | Age: 58
End: 2023-10-23
Payer: COMMERCIAL

## 2023-10-23 VITALS — BODY MASS INDEX: 50.02 KG/M2 | WEIGHT: 293 LBS | HEIGHT: 64 IN

## 2023-10-23 DIAGNOSIS — E78.1 PURE HYPERGLYCERIDEMIA: ICD-10-CM

## 2023-10-23 PROCEDURE — 99214 OFFICE O/P EST MOD 30 MIN: CPT | Mod: 95

## 2023-10-23 RX ORDER — ZOLPIDEM TARTRATE 12.5 MG/1
12.5 TABLET, EXTENDED RELEASE ORAL
Qty: 30 | Refills: 0 | Status: DISCONTINUED | COMMUNITY
Start: 2023-06-13 | End: 2023-10-23

## 2023-10-30 ENCOUNTER — RESULT REVIEW (OUTPATIENT)
Age: 58
End: 2023-10-30

## 2023-10-30 ENCOUNTER — APPOINTMENT (OUTPATIENT)
Dept: MAMMOGRAPHY | Facility: CLINIC | Age: 58
End: 2023-10-30
Payer: COMMERCIAL

## 2023-10-30 ENCOUNTER — OUTPATIENT (OUTPATIENT)
Dept: OUTPATIENT SERVICES | Facility: HOSPITAL | Age: 58
LOS: 1 days | End: 2023-10-30
Payer: COMMERCIAL

## 2023-10-30 DIAGNOSIS — Z98.89 OTHER SPECIFIED POSTPROCEDURAL STATES: Chronic | ICD-10-CM

## 2023-10-30 DIAGNOSIS — Z00.8 ENCOUNTER FOR OTHER GENERAL EXAMINATION: ICD-10-CM

## 2023-10-30 PROCEDURE — 77063 BREAST TOMOSYNTHESIS BI: CPT | Mod: 26

## 2023-10-30 PROCEDURE — 77063 BREAST TOMOSYNTHESIS BI: CPT

## 2023-10-30 PROCEDURE — 77067 SCR MAMMO BI INCL CAD: CPT

## 2023-10-30 PROCEDURE — 77067 SCR MAMMO BI INCL CAD: CPT | Mod: 26

## 2023-10-31 ENCOUNTER — TRANSCRIPTION ENCOUNTER (OUTPATIENT)
Age: 58
End: 2023-10-31

## 2023-11-01 ENCOUNTER — APPOINTMENT (OUTPATIENT)
Dept: BARIATRICS/WEIGHT MGMT | Facility: CLINIC | Age: 58
End: 2023-11-01
Payer: COMMERCIAL

## 2023-11-01 ENCOUNTER — APPOINTMENT (OUTPATIENT)
Dept: BARIATRICS/WEIGHT MGMT | Facility: CLINIC | Age: 58
End: 2023-11-01

## 2023-11-01 ENCOUNTER — TRANSCRIPTION ENCOUNTER (OUTPATIENT)
Age: 58
End: 2023-11-01

## 2023-11-01 VITALS
DIASTOLIC BLOOD PRESSURE: 75 MMHG | HEART RATE: 77 BPM | TEMPERATURE: 97.9 F | WEIGHT: 293 LBS | RESPIRATION RATE: 16 BRPM | HEIGHT: 64 IN | OXYGEN SATURATION: 99 % | SYSTOLIC BLOOD PRESSURE: 110 MMHG | BODY MASS INDEX: 50.02 KG/M2

## 2023-11-01 PROCEDURE — 99213 OFFICE O/P EST LOW 20 MIN: CPT

## 2023-11-03 ENCOUNTER — APPOINTMENT (OUTPATIENT)
Dept: FAMILY MEDICINE | Facility: CLINIC | Age: 58
End: 2023-11-03
Payer: COMMERCIAL

## 2023-11-03 VITALS
OXYGEN SATURATION: 98 % | SYSTOLIC BLOOD PRESSURE: 115 MMHG | HEART RATE: 88 BPM | RESPIRATION RATE: 16 BRPM | DIASTOLIC BLOOD PRESSURE: 80 MMHG | WEIGHT: 293 LBS | HEIGHT: 64 IN | BODY MASS INDEX: 50.02 KG/M2

## 2023-11-03 DIAGNOSIS — E53.8 DEFICIENCY OF OTHER SPECIFIED B GROUP VITAMINS: ICD-10-CM

## 2023-11-03 PROCEDURE — 99214 OFFICE O/P EST MOD 30 MIN: CPT | Mod: 25

## 2023-11-03 PROCEDURE — 96372 THER/PROPH/DIAG INJ SC/IM: CPT

## 2023-11-03 RX ADMIN — CYANOCOBALAMIN 0 MCG/ML: 1000 INJECTION INTRAMUSCULAR; SUBCUTANEOUS at 00:00

## 2023-11-04 ENCOUNTER — LABORATORY RESULT (OUTPATIENT)
Age: 58
End: 2023-11-04

## 2023-11-07 ENCOUNTER — APPOINTMENT (OUTPATIENT)
Dept: BARIATRICS/WEIGHT MGMT | Facility: CLINIC | Age: 58
End: 2023-11-07

## 2023-11-08 ENCOUNTER — TRANSCRIPTION ENCOUNTER (OUTPATIENT)
Age: 58
End: 2023-11-08

## 2023-11-08 LAB
25(OH)D3 SERPL-MCNC: 12 NG/ML
ALBUMIN SERPL ELPH-MCNC: 4.3 G/DL
ALP BLD-CCNC: 134 U/L
ALT SERPL-CCNC: 8 U/L
ANION GAP SERPL CALC-SCNC: 13 MMOL/L
AST SERPL-CCNC: 18 U/L
BILIRUB SERPL-MCNC: 0.5 MG/DL
BUN SERPL-MCNC: 9 MG/DL
CALCIUM SERPL-MCNC: 8.9 MG/DL
CHLORIDE SERPL-SCNC: 106 MMOL/L
CO2 SERPL-SCNC: 21 MMOL/L
CREAT SERPL-MCNC: 0.65 MG/DL
EGFR: 101 ML/MIN/1.73M2
ESTIMATED AVERAGE GLUCOSE: 105 MG/DL
GLUCOSE SERPL-MCNC: 91 MG/DL
HBA1C MFR BLD HPLC: 5.3 %
LPL SERPL-CCNC: 45 U/L
M TB IFN-G BLD-IMP: NEGATIVE
POTASSIUM SERPL-SCNC: 4.4 MMOL/L
PROT SERPL-MCNC: 6.4 G/DL
QUANTIFERON TB PLUS MITOGEN MINUS NIL: 6.76 IU/ML
QUANTIFERON TB PLUS NIL: 0.02 IU/ML
QUANTIFERON TB PLUS TB1 MINUS NIL: 0 IU/ML
QUANTIFERON TB PLUS TB2 MINUS NIL: 0 IU/ML
SODIUM SERPL-SCNC: 140 MMOL/L
T4 FREE SERPL-MCNC: 1.2 NG/DL
TSH SERPL-ACNC: 3.45 UIU/ML
VIT B12 SERPL-MCNC: >2000 PG/ML

## 2023-11-08 RX ORDER — ROSUVASTATIN CALCIUM 10 MG/1
10 TABLET, FILM COATED ORAL DAILY
Qty: 90 | Refills: 0 | Status: ACTIVE | COMMUNITY
Start: 2023-01-04 | End: 1900-01-01

## 2023-11-09 ENCOUNTER — TRANSCRIPTION ENCOUNTER (OUTPATIENT)
Age: 58
End: 2023-11-09

## 2023-11-10 ENCOUNTER — RX RENEWAL (OUTPATIENT)
Age: 58
End: 2023-11-10

## 2023-11-14 ENCOUNTER — TRANSCRIPTION ENCOUNTER (OUTPATIENT)
Age: 58
End: 2023-11-14

## 2023-11-20 ENCOUNTER — APPOINTMENT (OUTPATIENT)
Dept: FAMILY MEDICINE | Facility: CLINIC | Age: 58
End: 2023-11-20
Payer: COMMERCIAL

## 2023-11-20 PROCEDURE — 99213 OFFICE O/P EST LOW 20 MIN: CPT | Mod: 95

## 2023-12-05 ENCOUNTER — NON-APPOINTMENT (OUTPATIENT)
Age: 58
End: 2023-12-05

## 2023-12-06 ENCOUNTER — APPOINTMENT (OUTPATIENT)
Dept: BARIATRICS/WEIGHT MGMT | Facility: CLINIC | Age: 58
End: 2023-12-06

## 2023-12-06 ENCOUNTER — APPOINTMENT (OUTPATIENT)
Dept: FAMILY MEDICINE | Facility: CLINIC | Age: 58
End: 2023-12-06
Payer: COMMERCIAL

## 2023-12-06 VITALS
HEIGHT: 64 IN | HEART RATE: 98 BPM | SYSTOLIC BLOOD PRESSURE: 124 MMHG | OXYGEN SATURATION: 99 % | TEMPERATURE: 97.6 F | BODY MASS INDEX: 50.02 KG/M2 | WEIGHT: 293 LBS | DIASTOLIC BLOOD PRESSURE: 82 MMHG

## 2023-12-06 DIAGNOSIS — J20.9 ACUTE BRONCHITIS, UNSPECIFIED: ICD-10-CM

## 2023-12-06 LAB
CHOLEST SERPL-MCNC: 231 MG/DL
HDLC SERPL-MCNC: 56 MG/DL
LDLC SERPL CALC-MCNC: 126 MG/DL
NONHDLC SERPL-MCNC: 176 MG/DL
TRIGL SERPL-MCNC: 281 MG/DL

## 2023-12-06 PROCEDURE — 99214 OFFICE O/P EST MOD 30 MIN: CPT

## 2023-12-06 RX ORDER — AZITHROMYCIN 250 MG/1
250 TABLET, FILM COATED ORAL
Qty: 1 | Refills: 0 | Status: DISCONTINUED | COMMUNITY
Start: 2023-11-20 | End: 2023-12-06

## 2023-12-06 RX ORDER — ERGOCALCIFEROL 1.25 MG/1
1.25 MG CAPSULE ORAL
Qty: 8 | Refills: 0 | Status: ACTIVE | COMMUNITY
Start: 2023-12-06 | End: 1900-01-01

## 2023-12-08 ENCOUNTER — TRANSCRIPTION ENCOUNTER (OUTPATIENT)
Age: 58
End: 2023-12-08

## 2023-12-13 ENCOUNTER — APPOINTMENT (OUTPATIENT)
Dept: BARIATRICS/WEIGHT MGMT | Facility: CLINIC | Age: 58
End: 2023-12-13

## 2023-12-18 ENCOUNTER — APPOINTMENT (OUTPATIENT)
Dept: FAMILY MEDICINE | Facility: CLINIC | Age: 58
End: 2023-12-18
Payer: COMMERCIAL

## 2023-12-18 PROCEDURE — 99213 OFFICE O/P EST LOW 20 MIN: CPT | Mod: 95

## 2023-12-19 ENCOUNTER — RESULT CHARGE (OUTPATIENT)
Age: 58
End: 2023-12-19

## 2023-12-19 LAB
FLUAV SPEC QL CULT: NEGATIVE
FLUBV AG SPEC QL IA: NEGATIVE
RAPID RVP RESULT: DETECTED
RSV RNA SPEC QL NAA+PROBE: DETECTED
SARS-COV-2 RNA PNL RESP NAA+PROBE: NOT DETECTED

## 2023-12-19 NOTE — PLAN
[FreeTextEntry1] : Start Time: 3:30 pm End Time: 3: 55 pm Non-face-to-face time includes chart review prior to initiation of visit, and post visit processing of orders, and prescriptions.

## 2023-12-19 NOTE — HISTORY OF PRESENT ILLNESS
[Home] : at home, [unfilled] , at the time of the visit. [Verbal consent obtained from patient] : the patient, [unfilled] [Medical Office: ([facility])___] : at the medical office located in  [FreeTextEntry8] : Patient c/o sore throat, head congestion and a dry cough x 4 days.  Patient used Nasonex with minor relief. Symptoms are worsening. Has post nasal drip, clear d/c. +N/V. Temp 99. 1. Patient took 2 home Covid tests; one 3 days ago on 12/15/2023 and another one today and both were negative. Denies sob.

## 2023-12-19 NOTE — PHYSICAL EXAM
[No Acute Distress] : no acute distress [No Respiratory Distress] : no respiratory distress  [Normal] : affect was normal and insight and judgment were intact [de-identified] : Sounds nasal

## 2023-12-19 NOTE — ASSESSMENT
[FreeTextEntry1] : Viral URI, poss RSV. Vit C, vit D, Zinc. Increase fluids, rest. Tylenol prn. To ER if sob.

## 2023-12-31 PROBLEM — Z86.19 HISTORY OF SEXUALLY TRANSMITTED DISEASE: Status: RESOLVED | Noted: 2019-06-05 | Resolved: 2023-12-31

## 2024-01-03 ENCOUNTER — APPOINTMENT (OUTPATIENT)
Dept: FAMILY MEDICINE | Facility: CLINIC | Age: 59
End: 2024-01-03
Payer: COMMERCIAL

## 2024-01-03 VITALS
DIASTOLIC BLOOD PRESSURE: 74 MMHG | OXYGEN SATURATION: 96 % | SYSTOLIC BLOOD PRESSURE: 136 MMHG | HEART RATE: 117 BPM | HEIGHT: 64 IN | TEMPERATURE: 97.3 F

## 2024-01-03 PROCEDURE — 99214 OFFICE O/P EST MOD 30 MIN: CPT

## 2024-01-03 RX ORDER — OMEPRAZOLE 20 MG/1
20 CAPSULE, DELAYED RELEASE ORAL
Qty: 180 | Refills: 1 | Status: ACTIVE | COMMUNITY
Start: 2022-12-05 | End: 1900-01-01

## 2024-01-03 RX ORDER — ALBUTEROL SULFATE 2.5 MG/3ML
(2.5 MG/3ML) SOLUTION RESPIRATORY (INHALATION)
Qty: 1 | Refills: 1 | Status: ACTIVE | COMMUNITY
Start: 2024-01-03 | End: 1900-01-01

## 2024-01-03 NOTE — PHYSICAL EXAM
[No Acute Distress] : no acute distress [No Lymphadenopathy] : no lymphadenopathy [Normal Rate] : the respiratory rate was normal [Wet Cough] : a wet cough was heard [Rales / Crackles Bilateral] : no rales or crackles were heard [Wheezing Bilaterally] : no wheezing was heard [Rhonchi Bilateral] : no rhonchi were heard [Bronchial Breath Sounds Bilaterally] : bronchial breath sounds were heard over the bases bilaterally [Normal] : normal rate, regular rhythm, normal S1 and S2 and no murmur heard [No Edema] : there was no peripheral edema

## 2024-01-03 NOTE — PLAN
[FreeTextEntry1] : Moderate complexity decision making based on pt's multiple chronic medical conditions and polypharmacy.

## 2024-01-03 NOTE — HISTORY OF PRESENT ILLNESS
[FreeTextEntry1] : Patient is following up on medication management and respiratory syncytial virus. Patient complains of increased fatigue since RSV infection. Still recovering. Occ cough. SOB in cold air. Nebulizer helps. Her nebulizer broke. She was using it 2x/d.

## 2024-01-05 ENCOUNTER — TRANSCRIPTION ENCOUNTER (OUTPATIENT)
Age: 59
End: 2024-01-05

## 2024-01-08 ENCOUNTER — TRANSCRIPTION ENCOUNTER (OUTPATIENT)
Age: 59
End: 2024-01-08

## 2024-01-09 ENCOUNTER — TRANSCRIPTION ENCOUNTER (OUTPATIENT)
Age: 59
End: 2024-01-09

## 2024-01-09 RX ORDER — ALBUTEROL SULFATE 90 UG/1
108 (90 BASE) INHALANT RESPIRATORY (INHALATION)
Qty: 1 | Refills: 0 | Status: ACTIVE | COMMUNITY
Start: 2023-11-20 | End: 1900-01-01

## 2024-01-10 ENCOUNTER — APPOINTMENT (OUTPATIENT)
Dept: BARIATRICS/WEIGHT MGMT | Facility: CLINIC | Age: 59
End: 2024-01-10
Payer: COMMERCIAL

## 2024-01-10 VITALS — WEIGHT: 293 LBS | BODY MASS INDEX: 56.64 KG/M2

## 2024-01-10 PROCEDURE — 99213 OFFICE O/P EST LOW 20 MIN: CPT | Mod: 95

## 2024-01-10 NOTE — ASSESSMENT
[FreeTextEntry1] : 58 year old woman with a BMI of 56, now suffering from the long term effects of RSV, coughing on the call and complaining of nausea and vomiting for the last several days, unknown if the Wegovy or the effects of the illness I am going to add Zofran for the nausea that starts and hopefully it will daisy as she has been on the 2.4 for a while. If the nausea persists then consider 1.7 again.  RUEL diet  and  lots of fluids with some sweetener if she is not eating. To continue with matzoh ball soup.

## 2024-01-10 NOTE — HISTORY OF PRESENT ILLNESS
[FreeTextEntry1] : Here for follow up for wt management, is currently suffering from RSV over Xmas,  and Flu over Thanksgiving. Still sob and awaiting a nebulizer. Was 337 at the last visit 12/6/23 was 330. Now taking Wegovy  2.4 . Not total appetite control, lately has not been hungry and has been vomiting 2x day now for the past 4-5 days. Last Wegovy dose was on 1.7.24 but was ill prior to that.   Ate nothing yesterday as she was not hungry. 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   in general  breakfast nothing  Lunch  salad  with a protein tuna sandwich: no morales and veggies bread: charu killer water  snack  apples  dinner: 6 spinach pie salad with crasins with protein dessert none  ETOH none  Exer: none at the moment, has a

## 2024-01-10 NOTE — REASON FOR VISIT
[Follow-Up] : a follow-up visit [FreeTextEntry1] :  The patient agrees to this telehealth visit, is in  [facility], I am in [place], [place]

## 2024-01-16 ENCOUNTER — TRANSCRIPTION ENCOUNTER (OUTPATIENT)
Age: 59
End: 2024-01-16

## 2024-01-17 ENCOUNTER — TRANSCRIPTION ENCOUNTER (OUTPATIENT)
Age: 59
End: 2024-01-17

## 2024-01-18 ENCOUNTER — APPOINTMENT (OUTPATIENT)
Dept: FAMILY MEDICINE | Facility: CLINIC | Age: 59
End: 2024-01-18
Payer: COMMERCIAL

## 2024-01-18 DIAGNOSIS — J45.901 UNSPECIFIED ASTHMA WITH (ACUTE) EXACERBATION: ICD-10-CM

## 2024-01-18 PROCEDURE — 99214 OFFICE O/P EST MOD 30 MIN: CPT | Mod: 95

## 2024-01-18 NOTE — ASSESSMENT
[FreeTextEntry1] : Prednisone with taper. Albuterol prn. No work until Monday, January 29th unless work from home option. Pt needs to avoid cold temperatures until breathing improves. Follow up if symptoms worsen or persist. To ER if breathing labored and persistent.

## 2024-01-18 NOTE — PLAN
[FreeTextEntry1] : Start Time: 11:20 am End Time: 11:50 am Non-face-to-face time includes chart review prior to initiation of visit, and post visit processing of orders, and prescriptions, as well as processing note for work.

## 2024-01-18 NOTE — HISTORY OF PRESENT ILLNESS
[Home] : at home, [unfilled] , at the time of the visit. [Medical Office: ([facility])___] : at the medical office located in  [Verbal consent obtained from patient] : the patient, [unfilled] [Cough] : cough [Shortness Of Breath] : shortness of breath [Fatigue] : fatigue [de-identified] : Nausea and vomiting on and off [FreeTextEntry8] : Pt c/o cough x 1 month since RSV infection. Cough is getting worse, dry spasmodic, exacerbated by cold air. Cough is forceful, spasmodic, causing her to vomit at times, clear phlegm. Unable to go outside to cold air without increased sob and coughing. She has not been to her office or outside meetings or errands due to her coughing spells. No fever.

## 2024-01-19 ENCOUNTER — TRANSCRIPTION ENCOUNTER (OUTPATIENT)
Age: 59
End: 2024-01-19

## 2024-01-22 ENCOUNTER — RX RENEWAL (OUTPATIENT)
Age: 59
End: 2024-01-22

## 2024-02-01 ENCOUNTER — APPOINTMENT (OUTPATIENT)
Dept: FAMILY MEDICINE | Facility: CLINIC | Age: 59
End: 2024-02-01
Payer: COMMERCIAL

## 2024-02-01 VITALS
OXYGEN SATURATION: 98 % | HEART RATE: 102 BPM | BODY MASS INDEX: 50.02 KG/M2 | SYSTOLIC BLOOD PRESSURE: 128 MMHG | DIASTOLIC BLOOD PRESSURE: 82 MMHG | WEIGHT: 293 LBS | HEIGHT: 64 IN | TEMPERATURE: 97.1 F

## 2024-02-01 DIAGNOSIS — J11.1 INFLUENZA DUE TO UNIDENTIFIED INFLUENZA VIRUS WITH OTHER RESPIRATORY MANIFESTATIONS: ICD-10-CM

## 2024-02-01 DIAGNOSIS — Z92.29 PERSONAL HISTORY OF OTHER DRUG THERAPY: ICD-10-CM

## 2024-02-01 DIAGNOSIS — J45.901 UNSPECIFIED ASTHMA WITH (ACUTE) EXACERBATION: ICD-10-CM

## 2024-02-01 PROCEDURE — 99214 OFFICE O/P EST MOD 30 MIN: CPT

## 2024-02-01 PROCEDURE — G2211 COMPLEX E/M VISIT ADD ON: CPT

## 2024-02-01 RX ORDER — BENZONATATE 100 MG/1
100 CAPSULE ORAL EVERY 8 HOURS
Qty: 21 | Refills: 0 | Status: DISCONTINUED | COMMUNITY
Start: 2023-11-20 | End: 2024-02-01

## 2024-02-01 RX ORDER — FLUTICASONE FUROATE AND VILANTEROL TRIFENATATE 100; 25 UG/1; UG/1
100-25 POWDER RESPIRATORY (INHALATION) DAILY
Qty: 1 | Refills: 1 | Status: ACTIVE | COMMUNITY
Start: 2024-02-01 | End: 1900-01-01

## 2024-02-01 RX ORDER — CARISOPRODOL 350 MG/1
350 TABLET ORAL
Qty: 60 | Refills: 0 | Status: ACTIVE | COMMUNITY
Start: 1900-01-01 | End: 1900-01-01

## 2024-02-01 NOTE — PHYSICAL EXAM
[No Acute Distress] : no acute distress [Normal Oropharynx] : the oropharynx was normal [Normal TMs] : both tympanic membranes were normal [No Lymphadenopathy] : no lymphadenopathy [Normal Rhythm/Effort] : normal respiratory rhythm and effort [Rales / Crackles Bilateral] : no rales or crackles were heard [Wheezing Bilaterally] : no wheezing was heard [Rhonchi Bilateral] : no rhonchi were heard [Bronchial Breath Sounds Bilaterally] : bronchial breath sounds were heard over the bases bilaterally [No Edema] : there was no peripheral edema [Normal] : affect was normal and insight and judgment were intact

## 2024-02-01 NOTE — ASSESSMENT
[FreeTextEntry1] : Finish po prednisone taper. Add Breo Ellipta. Continue albuterol prn. Pt back to work. F/u 1 month.

## 2024-02-02 ENCOUNTER — RX RENEWAL (OUTPATIENT)
Age: 59
End: 2024-02-02

## 2024-02-06 RX ORDER — TRIAMTERENE AND HYDROCHLOROTHIAZIDE 37.5; 25 MG/1; MG/1
37.5-25 CAPSULE ORAL
Qty: 90 | Refills: 1 | Status: ACTIVE | COMMUNITY
Start: 2023-07-10 | End: 1900-01-01

## 2024-02-07 ENCOUNTER — APPOINTMENT (OUTPATIENT)
Dept: CARDIOLOGY | Facility: CLINIC | Age: 59
End: 2024-02-07

## 2024-02-14 ENCOUNTER — APPOINTMENT (OUTPATIENT)
Dept: BARIATRICS/WEIGHT MGMT | Facility: CLINIC | Age: 59
End: 2024-02-14
Payer: COMMERCIAL

## 2024-02-14 VITALS — BODY MASS INDEX: 56.3 KG/M2 | WEIGHT: 293 LBS

## 2024-02-14 DIAGNOSIS — N95.1 MENOPAUSAL AND FEMALE CLIMACTERIC STATES: ICD-10-CM

## 2024-02-14 DIAGNOSIS — J45.30 MILD PERSISTENT ASTHMA, UNCOMPLICATED: ICD-10-CM

## 2024-02-14 PROCEDURE — 99213 OFFICE O/P EST LOW 20 MIN: CPT

## 2024-02-14 RX ORDER — PREDNISONE 10 MG/1
10 TABLET ORAL
Qty: 31 | Refills: 0 | Status: DISCONTINUED | COMMUNITY
Start: 2024-01-18 | End: 2024-02-14

## 2024-02-14 NOTE — HISTORY OF PRESENT ILLNESS
[FreeTextEntry1] : Here for follow up for wt management. Recovering from RSV that she had just prior to X[facility]AS. Still has to use her nebulizer, still with labored breathing 2 weeks ago and now on several inhalers including a hand held neb.   Breakfast;  none  Lunch: 12 Tuna with tomato and lettuce no morales on a Mary bun lots of lettuce water  snack  banana  Dinner 5:30-6:00 3 bean salad, with water, and lettuce and tomatoes and cukes and no dressing water   no ETOH  exer: still not fully recovered from her RSV to exer much, doing 15 min on treadmill about trying daily  sleep; interrupted by hot flashes, still getting calls from clients in [place]

## 2024-02-14 NOTE — REASON FOR VISIT
[Follow-Up] : a follow-up visit [FreeTextEntry1] :  The patient agrees to this telehealth visit, is in    [facility], I am in [place], [place]

## 2024-02-14 NOTE — ASSESSMENT
[FreeTextEntry1] : 58 year old woman with a BMI of 56, now suffering from the long term effects of RSV, cough is. much improved nausea and vomiting resolved so unlikely her wegovy. Still on the 2.4 weekly and no longer with issues  1. cont the Wegovy 2.4 and needs renewal  2 slowly increase exer /time on the treadmill about 5 min weekly,  and her  has been ill so she is a bit reticent to use him, concerned  and suggested she get the RSV vaccine.  3. cont with with veggies with lunch and dinner, does not eat breakfast 4 fruits with dinner and as a. snack

## 2024-03-01 ENCOUNTER — APPOINTMENT (OUTPATIENT)
Dept: FAMILY MEDICINE | Facility: CLINIC | Age: 59
End: 2024-03-01
Payer: COMMERCIAL

## 2024-03-01 VITALS
BODY MASS INDEX: 50.02 KG/M2 | HEART RATE: 101 BPM | SYSTOLIC BLOOD PRESSURE: 122 MMHG | DIASTOLIC BLOOD PRESSURE: 80 MMHG | WEIGHT: 293 LBS | OXYGEN SATURATION: 98 % | HEIGHT: 64 IN | RESPIRATION RATE: 16 BRPM

## 2024-03-01 DIAGNOSIS — B33.8 OTHER SPECIFIED VIRAL DISEASES: ICD-10-CM

## 2024-03-01 DIAGNOSIS — Z01.419 ENCOUNTER FOR GYNECOLOGICAL EXAMINATION (GENERAL) (ROUTINE) W/OUT ABNORMAL FINDINGS: ICD-10-CM

## 2024-03-01 DIAGNOSIS — R39.9 UNSPECIFIED SYMPTOMS AND SIGNS INVOLVING THE GENITOURINARY SYSTEM: ICD-10-CM

## 2024-03-01 DIAGNOSIS — K21.9 GASTRO-ESOPHAGEAL REFLUX DISEASE W/OUT ESOPHAGITIS: ICD-10-CM
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
## 2024-03-01 DIAGNOSIS — F51.04 PSYCHOPHYSIOLOGIC INSOMNIA: ICD-10-CM

## 2024-03-01 PROCEDURE — 99214 OFFICE O/P EST MOD 30 MIN: CPT

## 2024-03-01 PROCEDURE — G2211 COMPLEX E/M VISIT ADD ON: CPT

## 2024-03-01 NOTE — PLAN
[FreeTextEntry1] : Pt stable. F/u with orthopedic surgeon to assess right knee instability s/p R TKR. Pt aware not to take the zolpidem on the same evening that she takes her alprazolam. F/U 1 month.     Moderate complexity decision making based on pt's multiple chronic medical conditions and polypharmacy.

## 2024-03-01 NOTE — PHYSICAL EXAM
[No Acute Distress] : no acute distress [Normal Sclera/Conjunctiva] : normal sclera/conjunctiva [No Lymphadenopathy] : no lymphadenopathy [Normal Rate] : the respiratory rate was normal [Wheezing Bilaterally] : no wheezing was heard [Rales / Crackles Bilateral] : no rales or crackles were heard [Rhonchi Bilateral] : no rhonchi were heard [No Edema] : there was no peripheral edema [No Focal Deficits] : no focal deficits [Normal] : affect was normal and insight and judgment were intact [de-identified] : Fading ecchymotic patch left anterior mid shin, mildly tender to palpation. B/l knee flexion/extension w/o pain.

## 2024-03-01 NOTE — HISTORY OF PRESENT ILLNESS
[FreeTextEntry1] : Pt c/o fall at home last week. Right knee buckled and gave way causing her to fall. Pt was getting her mail at the time and fell on cement driveway. No head injury. She sustained a bruise to her left lower leg which is tender. No pain. Ambulating with her cane. Pt is also following up from prolonged URI s/p RSV infection. Cough finally resolved. No sob. Pt has hx of anxiety disorder with panic attack, controlled on current tx.  [de-identified] : 2/1/24: Pt is following up on RSV infection. Prednisone 10 mg/d now. Still with cough and mild sob in cold air and with exertion. C/o exacerbation of chronic low back pain. Muscle relaxant helps.   Finish po prednisone taper. Add Breo Ellipta. Continue albuterol prn. Pt back to work. F/u 1 month.

## 2024-03-13 ENCOUNTER — APPOINTMENT (OUTPATIENT)
Dept: BARIATRICS/WEIGHT MGMT | Facility: CLINIC | Age: 59
End: 2024-03-13
Payer: COMMERCIAL

## 2024-03-13 VITALS — WEIGHT: 293 LBS | BODY MASS INDEX: 56.13 KG/M2

## 2024-03-13 DIAGNOSIS — K76.0 FATTY (CHANGE OF) LIVER, NOT ELSEWHERE CLASSIFIED: ICD-10-CM

## 2024-03-13 PROCEDURE — 99213 OFFICE O/P EST LOW 20 MIN: CPT

## 2024-03-13 PROCEDURE — G2211 COMPLEX E/M VISIT ADD ON: CPT

## 2024-03-13 NOTE — REASON FOR VISIT
[Follow-Up] : a follow-up visit [FreeTextEntry1] :  The patient agrees to this telehealth visit, is in   [facility], I am in [place], [place]

## 2024-03-13 NOTE — ASSESSMENT
[FreeTextEntry1] : 58 year old woman with a BMI of 56, now suffering from the long term effects of RSV, cough is. much improved nausea and vomiting resolved so unlikely her wegovy. Still on the 2.4 weekly and no longer with issues  1. cont the Wegovy 2.4 and needs renewal , has no copay now 2 slowly increase exer /time on the treadmill about 5 min weekly,  and her   was sick and planning on restarting again , will start with walking   and suggested she get the RSV vaccine does not want to get.  3. cont with with veggies with lunch and dinner, does not eat breakfast 4 fruits with dinner and as a. snack

## 2024-04-03 ENCOUNTER — APPOINTMENT (OUTPATIENT)
Dept: FAMILY MEDICINE | Facility: CLINIC | Age: 59
End: 2024-04-03
Payer: COMMERCIAL

## 2024-04-03 VITALS
HEIGHT: 64 IN | WEIGHT: 293 LBS | OXYGEN SATURATION: 96 % | BODY MASS INDEX: 50.02 KG/M2 | SYSTOLIC BLOOD PRESSURE: 128 MMHG | HEART RATE: 114 BPM | DIASTOLIC BLOOD PRESSURE: 86 MMHG | TEMPERATURE: 97.2 F

## 2024-04-03 DIAGNOSIS — M25.30 OTHER INSTABILITY, UNSPECIFIED JOINT: ICD-10-CM

## 2024-04-03 DIAGNOSIS — Z23 ENCOUNTER FOR IMMUNIZATION: ICD-10-CM

## 2024-04-03 DIAGNOSIS — M54.50 LOW BACK PAIN, UNSPECIFIED: ICD-10-CM

## 2024-04-03 DIAGNOSIS — Z87.892 PERSONAL HISTORY OF ANAPHYLAXIS: ICD-10-CM

## 2024-04-03 DIAGNOSIS — D18.03 HEMANGIOMA OF INTRA-ABDOMINAL STRUCTURES: ICD-10-CM

## 2024-04-03 DIAGNOSIS — G47.00 INSOMNIA, UNSPECIFIED: ICD-10-CM

## 2024-04-03 DIAGNOSIS — G89.29 LOW BACK PAIN, UNSPECIFIED: ICD-10-CM

## 2024-04-03 DIAGNOSIS — M17.11 UNILATERAL PRIMARY OSTEOARTHRITIS, RIGHT KNEE: ICD-10-CM

## 2024-04-03 PROCEDURE — 99214 OFFICE O/P EST MOD 30 MIN: CPT

## 2024-04-03 NOTE — PHYSICAL EXAM
[No Acute Distress] : no acute distress [Normal Sclera/Conjunctiva] : normal sclera/conjunctiva [No Lymphadenopathy] : no lymphadenopathy [Normal Rate] : the respiratory rate was normal [Rales / Crackles Bilateral] : no rales or crackles were heard [Wheezing Bilaterally] : no wheezing was heard [Rhonchi Bilateral] : no rhonchi were heard [No Edema] : there was no peripheral edema [No Focal Deficits] : no focal deficits [Normal] : affect was normal and insight and judgment were intact

## 2024-04-03 NOTE — HISTORY OF PRESENT ILLNESS
[FreeTextEntry1] : Pt c/o right knee still giving way on occasion. No recent falls. To see orthopedist regarding right knee instability. Finally stopped coughing. Had prolonged residual cough after RSV infection. To start kickbox exercise class. Saw GI recently and to have abdominal MRI for surveillance of hepatic hemangioma. No pancreatic cyst seen on MRI abdomen 8/2024. Patient c/o persistent anxiety and insomnia.

## 2024-04-03 NOTE — PLAN
[FreeTextEntry1] : Continue current medications. Recommend PCV 20 vaccine. Pt is hesitant as she had anaphylactic reaction to influenza vaccine many years ago causing her to pass out and associated with acute onset of sob, requiring hospital care. She denies egg allergy. Pt has not had any vaccines since. She reports having all routine childhood vaccines and tolerating them without incident. Will defer vaccine today. Recommend pt seek allergy consultation before receiving additional vaccine. F/U 1 month.    Pt encounter incorporated clinical review of the medical record including consultation from specialists, review of lab and diagnostic testing with interpretation and discussion of results with patient, general pt counseling, and coordination of care, as well as documentation update within the electronic medical record.  Time spent: 30 mins.

## 2024-04-10 ENCOUNTER — APPOINTMENT (OUTPATIENT)
Dept: BARIATRICS/WEIGHT MGMT | Facility: CLINIC | Age: 59
End: 2024-04-10
Payer: COMMERCIAL

## 2024-04-10 VITALS — WEIGHT: 293 LBS | BODY MASS INDEX: 56.13 KG/M2

## 2024-04-10 PROCEDURE — 99214 OFFICE O/P EST MOD 30 MIN: CPT

## 2024-04-10 NOTE — REASON FOR VISIT
[Follow-Up] : a follow-up visit [FreeTextEntry1] :  The patient consents to this telehealth visit, is in [facility], I am in [place], [place], This service was provided using 2-way audiovisual technology. The patient and provider provider participated in this telehealth visit.

## 2024-04-10 NOTE — HISTORY OF PRESENT ILLNESS
[FreeTextEntry1] : Here for follow up for wt management. according to her scale her wt is stable at 327  Breakfast tea  Lunch nothing yesterday tuna fish on bread charu killer bun without morales water  snack  fruit  Dinner: 5-6 having problems with taste buds and eating less spicy foods salad with veggies was away in [place] due to the death of her sig others sister's death  starting to feel hungry, and getting harder to ignore Still has Wegovy and still has enough for the next two weeks.   Exer: started with a new  again now 2x weeks and started shira 45 min   sleep: 5-6 hours, is an insomniac since she was 11, no snores and had a sleep study and was negative

## 2024-04-10 NOTE — ASSESSMENT
[FreeTextEntry1] : 58 year old woman with a BMI of 56, now suffering from the long term effects of RSV, cough is. much improved nausea and vomiting resolved so unlikely her wegovy. Still on the 2.4 weekly and no longer with issues  1. cont the Wegovy 2.4 and needs renewal , has no copay now 2 slowly increase exer /time on the treadmill about 20 min QOD  and  started with a new  2x week for an hous and, Brunilda started now about 1x- 2x  week.   3. cont with with veggies with lunch and dinner, does not eat breakfast To increase the fiber in her diet, may help with her increased hunger , may need to try Zepbound 4 fruits with dinner and as a. snack follow up in 4 weeks

## 2024-05-01 ENCOUNTER — APPOINTMENT (OUTPATIENT)
Dept: FAMILY MEDICINE | Facility: CLINIC | Age: 59
End: 2024-05-01
Payer: COMMERCIAL

## 2024-05-01 VITALS
WEIGHT: 293 LBS | BODY MASS INDEX: 50.02 KG/M2 | DIASTOLIC BLOOD PRESSURE: 82 MMHG | TEMPERATURE: 97.1 F | SYSTOLIC BLOOD PRESSURE: 118 MMHG | HEART RATE: 112 BPM | OXYGEN SATURATION: 96 % | HEIGHT: 64 IN

## 2024-05-01 DIAGNOSIS — M19.90 UNSPECIFIED OSTEOARTHRITIS, UNSPECIFIED SITE: ICD-10-CM

## 2024-05-01 DIAGNOSIS — F41.0 PANIC DISORDER [EPISODIC PAROXYSMAL ANXIETY]: ICD-10-CM

## 2024-05-01 DIAGNOSIS — K86.2 CYST OF PANCREAS: ICD-10-CM

## 2024-05-01 DIAGNOSIS — R53.82 CHRONIC FATIGUE, UNSPECIFIED: ICD-10-CM

## 2024-05-01 DIAGNOSIS — K52.9 NONINFECTIVE GASTROENTERITIS AND COLITIS, UNSPECIFIED: ICD-10-CM

## 2024-05-01 PROCEDURE — G2211 COMPLEX E/M VISIT ADD ON: CPT

## 2024-05-01 PROCEDURE — 96372 THER/PROPH/DIAG INJ SC/IM: CPT

## 2024-05-01 PROCEDURE — 99214 OFFICE O/P EST MOD 30 MIN: CPT | Mod: 25

## 2024-05-01 RX ORDER — CYANOCOBALAMIN 1000 UG/ML
1000 INJECTION INTRAMUSCULAR; SUBCUTANEOUS
Qty: 0 | Refills: 0 | Status: COMPLETED | OUTPATIENT
Start: 2024-05-01

## 2024-05-01 RX ADMIN — CYANOCOBALAMIN 0 MCG/ML: 1000 INJECTION INTRAMUSCULAR; SUBCUTANEOUS at 00:00

## 2024-05-01 NOTE — PHYSICAL EXAM
[No Acute Distress] : no acute distress [Normal Sclera/Conjunctiva] : normal sclera/conjunctiva [No Lymphadenopathy] : no lymphadenopathy [No Respiratory Distress] : no respiratory distress  [Clear to Auscultation] : lungs were clear to auscultation bilaterally [No Edema] : there was no peripheral edema [Soft] : abdomen soft [Non Tender] : non-tender [No Masses] : no abdominal mass palpated [No HSM] : no HSM [Normal Bowel Sounds] : normal bowel sounds [No Focal Deficits] : no focal deficits [Normal] : affect was normal and insight and judgment were intact

## 2024-05-01 NOTE — PLAN
[FreeTextEntry1] : F/U with GI to f/u on fatty liver and hepatic hemangioma. Pt aware not to take the zolpidem on the same day as the alprazolam. Continue f/u with OM. F/U 1 month.    Moderate complexity decision making based on pt's multiple chronic medical conditions and polypharmacy.

## 2024-05-08 ENCOUNTER — APPOINTMENT (OUTPATIENT)
Dept: BARIATRICS/WEIGHT MGMT | Facility: CLINIC | Age: 59
End: 2024-05-08

## 2024-05-31 ENCOUNTER — APPOINTMENT (OUTPATIENT)
Dept: FAMILY MEDICINE | Facility: CLINIC | Age: 59
End: 2024-05-31
Payer: COMMERCIAL

## 2024-05-31 VITALS
BODY MASS INDEX: 50.02 KG/M2 | HEIGHT: 64 IN | SYSTOLIC BLOOD PRESSURE: 124 MMHG | TEMPERATURE: 97.7 F | WEIGHT: 293 LBS | HEART RATE: 85 BPM | OXYGEN SATURATION: 97 % | DIASTOLIC BLOOD PRESSURE: 84 MMHG

## 2024-05-31 DIAGNOSIS — M25.50 PAIN IN UNSPECIFIED JOINT: ICD-10-CM

## 2024-05-31 DIAGNOSIS — Z63.4 DISAPPEARANCE AND DEATH OF FAMILY MEMBER: ICD-10-CM

## 2024-05-31 DIAGNOSIS — M25.661 STIFFNESS OF RIGHT KNEE, NOT ELSEWHERE CLASSIFIED: ICD-10-CM

## 2024-05-31 DIAGNOSIS — F41.1 GENERALIZED ANXIETY DISORDER: ICD-10-CM

## 2024-05-31 DIAGNOSIS — Z96.651 PAIN IN RIGHT KNEE: ICD-10-CM

## 2024-05-31 DIAGNOSIS — M25.561 PAIN IN RIGHT KNEE: ICD-10-CM

## 2024-05-31 DIAGNOSIS — R35.0 FREQUENCY OF MICTURITION: ICD-10-CM

## 2024-05-31 DIAGNOSIS — E66.01 MORBID (SEVERE) OBESITY DUE TO EXCESS CALORIES: ICD-10-CM

## 2024-05-31 DIAGNOSIS — G89.29 PAIN IN RIGHT KNEE: ICD-10-CM

## 2024-05-31 DIAGNOSIS — L40.9 PSORIASIS, UNSPECIFIED: ICD-10-CM

## 2024-05-31 PROCEDURE — 99214 OFFICE O/P EST MOD 30 MIN: CPT

## 2024-05-31 RX ORDER — ZOLPIDEM TARTRATE 10 MG/1
10 TABLET ORAL
Qty: 30 | Refills: 1 | Status: ACTIVE | COMMUNITY
Start: 2021-05-03 | End: 1900-01-01

## 2024-05-31 RX ORDER — CLOBETASOL PROPIONATE 0.5 MG/ML
0.05 SOLUTION TOPICAL DAILY
Qty: 1 | Refills: 0 | Status: ACTIVE | COMMUNITY
Start: 2021-04-01 | End: 1900-01-01

## 2024-05-31 RX ORDER — SEMAGLUTIDE 2.4 MG/.75ML
2.4 INJECTION, SOLUTION SUBCUTANEOUS
Qty: 3 | Refills: 0 | Status: DISCONTINUED | COMMUNITY
Start: 2023-02-01 | End: 2024-05-31

## 2024-05-31 SDOH — SOCIAL STABILITY - SOCIAL INSECURITY: DISSAPEARANCE AND DEATH OF FAMILY MEMBER: Z63.4

## 2024-06-01 PROBLEM — E66.01 MORBID OBESITY WITH BODY MASS INDEX (BMI) OF 50.0 TO 59.9 IN ADULT: Status: ACTIVE | Noted: 2021-03-01

## 2024-06-01 NOTE — ASSESSMENT
[FreeTextEntry1] : Pt will f/u with her orthopedist regarding her right knee pain and address how she will proceed with treatment. Further recommendations pending urine testing. Try functional medicine nutrition counseling.

## 2024-06-01 NOTE — PHYSICAL EXAM
[No Acute Distress] : no acute distress [Normal Sclera/Conjunctiva] : normal sclera/conjunctiva [No Lymphadenopathy] : no lymphadenopathy [No Respiratory Distress] : no respiratory distress  [Clear to Auscultation] : lungs were clear to auscultation bilaterally [No Edema] : there was no peripheral edema [Grossly Normal Strength/Tone] : grossly normal strength/tone [No Focal Deficits] : no focal deficits [Normal] : affect was normal and insight and judgment were intact [de-identified] : Mild tenderness posterior right knee.

## 2024-06-01 NOTE — HISTORY OF PRESENT ILLNESS
[FreeTextEntry1] : Patient is following up on anxiety. Her brother passed away last week and patient is responsible for arrangements. Patient also wishes to discuss her previous right knee replacement sx. She states she has had chronic pain in the right knee since her replacement. Pain is worse at the back of the right knee. She states the chronic right knee pain has impacted her distance walking. She has sought a 2nd othopedic opinion for the chronic right knee pain s/p replacement and she was advised a revision of the right knee replacement, according to pt. She is reluctant to go through another surgery for her right knee. Pt c/o increased urine frequency over the past 2 weeks. She stopped her Wegovy. She did not feel like it was helping and she did not like the way she felt on it. Mild nausea, bloating.

## 2024-06-03 ENCOUNTER — APPOINTMENT (OUTPATIENT)
Dept: BARIATRICS/WEIGHT MGMT | Facility: CLINIC | Age: 59
End: 2024-06-03
Payer: COMMERCIAL

## 2024-06-03 VITALS — BODY MASS INDEX: 58.36 KG/M2 | WEIGHT: 293 LBS

## 2024-06-03 PROCEDURE — G2211 COMPLEX E/M VISIT ADD ON: CPT

## 2024-06-03 PROCEDURE — 99213 OFFICE O/P EST LOW 20 MIN: CPT

## 2024-06-03 NOTE — HISTORY OF PRESENT ILLNESS
[FreeTextEntry1] : Here for follow up for wt management.  Lost her brother with in the last month, did not take her wegovy and has not taken it for several weeks Was going back and forth from [place].   has not been sleeping, was on  the phone with Dr Lucero Alcantar due to her increased panic , due to the increased stress and found out that the reason her right knee is hurting so much has to do with shifted cement and will need repeat surgery to correct.   Has not been able to exercise due to the pain.

## 2024-06-03 NOTE — ASSESSMENT
[FreeTextEntry1] : 58 year old woman with a BMI of 56, now suffering from the long term effects of RSV, cough is. much improved nausea and vomiting resolved so unlikely her wegovy. Still on the 2.4 weekly and no longer with issues  1. cont the Wegovy  but will go down to 1.7 as she has been off for a while, has zofran, goal is tor wt maintenance while she is in this tough time( brother , work busy)  2 slowly increase exer /time on the treadmill about 20 min QOD  and  started with a new  2x week for an hous and, Brunilda started now about 1x- 2x  week.   3. cont with with veggies with lunch and dinner, does not eat breakfast To increase the fiber in her diet, may help with her increased hunger , may need to try Zepbound 4 fruits with dinner and as a. snack follow up in 4 weeks

## 2024-06-03 NOTE — REASON FOR VISIT
[Follow-Up] : a follow-up visit [FreeTextEntry1] :  The patient consents to this telehealth visit, is in [place], [place], I am in [place], [place], This service was provided using 2-way audiovisual technology. The patient and provider provider participated in this telehealth visit.

## 2024-06-06 ENCOUNTER — APPOINTMENT (OUTPATIENT)
Dept: FAMILY MEDICINE | Facility: CLINIC | Age: 59
End: 2024-06-06

## 2024-06-06 ENCOUNTER — TRANSCRIPTION ENCOUNTER (OUTPATIENT)
Age: 59
End: 2024-06-06

## 2024-06-19 RX ORDER — BENZONATATE 200 MG/1
200 CAPSULE ORAL
Qty: 30 | Refills: 0 | Status: ACTIVE | COMMUNITY
Start: 2023-12-18 | End: 1900-01-01

## 2024-06-24 ENCOUNTER — APPOINTMENT (OUTPATIENT)
Dept: FAMILY MEDICINE | Facility: CLINIC | Age: 59
End: 2024-06-24

## 2024-06-26 ENCOUNTER — RX RENEWAL (OUTPATIENT)
Age: 59
End: 2024-06-26

## 2024-06-27 ENCOUNTER — RX RENEWAL (OUTPATIENT)
Age: 59
End: 2024-06-27

## 2024-07-01 ENCOUNTER — APPOINTMENT (OUTPATIENT)
Dept: BARIATRICS/WEIGHT MGMT | Facility: CLINIC | Age: 59
End: 2024-07-01
Payer: COMMERCIAL

## 2024-07-01 VITALS — BODY MASS INDEX: 58.36 KG/M2 | WEIGHT: 293 LBS

## 2024-07-01 DIAGNOSIS — E55.9 VITAMIN D DEFICIENCY, UNSPECIFIED: ICD-10-CM

## 2024-07-01 DIAGNOSIS — E66.01 MORBID (SEVERE) OBESITY DUE TO EXCESS CALORIES: ICD-10-CM

## 2024-07-01 DIAGNOSIS — E78.5 HYPERLIPIDEMIA, UNSPECIFIED: ICD-10-CM

## 2024-07-01 PROCEDURE — G2211 COMPLEX E/M VISIT ADD ON: CPT

## 2024-07-01 PROCEDURE — 99213 OFFICE O/P EST LOW 20 MIN: CPT

## 2024-07-03 ENCOUNTER — APPOINTMENT (OUTPATIENT)
Dept: FAMILY MEDICINE | Facility: CLINIC | Age: 59
End: 2024-07-03
Payer: COMMERCIAL

## 2024-07-03 VITALS
SYSTOLIC BLOOD PRESSURE: 126 MMHG | HEART RATE: 80 BPM | DIASTOLIC BLOOD PRESSURE: 80 MMHG | OXYGEN SATURATION: 96 % | HEIGHT: 64 IN | TEMPERATURE: 98.1 F | RESPIRATION RATE: 16 BRPM

## 2024-07-03 DIAGNOSIS — F41.1 GENERALIZED ANXIETY DISORDER: ICD-10-CM

## 2024-07-03 DIAGNOSIS — G47.00 INSOMNIA, UNSPECIFIED: ICD-10-CM

## 2024-07-03 DIAGNOSIS — J45.30 MILD PERSISTENT ASTHMA, UNCOMPLICATED: ICD-10-CM

## 2024-07-03 DIAGNOSIS — R35.0 FREQUENCY OF MICTURITION: ICD-10-CM

## 2024-07-03 LAB
BILIRUB UR QL STRIP: NORMAL
CLARITY UR: CLEAR
COLLECTION METHOD: NORMAL
GLUCOSE UR-MCNC: NORMAL
HCG UR QL: 1 EU/DL
HGB UR QL STRIP.AUTO: NORMAL
KETONES UR-MCNC: NORMAL
LEUKOCYTE ESTERASE UR QL STRIP: NORMAL
NITRITE UR QL STRIP: NORMAL
PH UR STRIP: 6
PROT UR STRIP-MCNC: NORMAL
SP GR UR STRIP: 1.02

## 2024-07-03 PROCEDURE — 81003 URINALYSIS AUTO W/O SCOPE: CPT | Mod: QW

## 2024-07-03 PROCEDURE — 99214 OFFICE O/P EST MOD 30 MIN: CPT

## 2024-07-03 RX ORDER — SULFAMETHOXAZOLE AND TRIMETHOPRIM 800; 160 MG/1; MG/1
800-160 TABLET ORAL TWICE DAILY
Qty: 10 | Refills: 0 | Status: ACTIVE | COMMUNITY
Start: 2024-07-03 | End: 1900-01-01

## 2024-07-09 ENCOUNTER — TRANSCRIPTION ENCOUNTER (OUTPATIENT)
Age: 59
End: 2024-07-09

## 2024-07-09 DIAGNOSIS — R06.09 OTHER FORMS OF DYSPNEA: ICD-10-CM

## 2024-07-10 ENCOUNTER — TRANSCRIPTION ENCOUNTER (OUTPATIENT)
Age: 59
End: 2024-07-10

## 2024-07-31 ENCOUNTER — APPOINTMENT (OUTPATIENT)
Dept: FAMILY MEDICINE | Facility: CLINIC | Age: 59
End: 2024-07-31

## 2024-08-02 ENCOUNTER — APPOINTMENT (OUTPATIENT)
Dept: FAMILY MEDICINE | Facility: CLINIC | Age: 59
End: 2024-08-02

## 2024-08-05 ENCOUNTER — APPOINTMENT (OUTPATIENT)
Dept: BARIATRICS/WEIGHT MGMT | Facility: CLINIC | Age: 59
End: 2024-08-05

## 2024-08-05 ENCOUNTER — APPOINTMENT (OUTPATIENT)
Dept: FAMILY MEDICINE | Facility: CLINIC | Age: 59
End: 2024-08-05

## 2024-08-05 PROCEDURE — 99214 OFFICE O/P EST MOD 30 MIN: CPT

## 2024-08-06 NOTE — HISTORY OF PRESENT ILLNESS
[Home] : at home, [unfilled] , at the time of the visit. [Medical Office: ([facility])___] : at the medical office located in  [Verbal consent obtained from patient] : the patient, [unfilled] [FreeTextEntry1] : Pt c/o fall in her house last night and she hurt her right knee. Right knee buckled under her and she fell to the floor. She was able to get up on her own. Ambulating with a cane now. She plans on getting an xray of her right knee tomorrow and will f/u with her orthopedist. She is being considered for revision of her right knee arthroplasty. Pt started with nutritionist named Hamida. She is modifying her meal timing and food types. So far it has been a positive experience. The pt had recent bw and would like to review results. Anxiety has been stable.

## 2024-08-06 NOTE — PLAN
[FreeTextEntry1] : Continue orthopedic f/u for right knee pain worse s/p fall. Meloxicam and Tylenol prn. Pt is aware not to take her zolpidem on same day as alprazolam.    Start Time: 2:15 pm End Time: 2:45 pm Non-face-to-face time includes chart review prior to initiation of visit, and post visit processing of orders, and prescriptions.

## 2024-08-06 NOTE — HISTORY OF PRESENT ILLNESS
[Home] : at home, [unfilled] , at the time of the visit. [Medical Office: ([facility])___] : at the medical office located in  [Verbal consent obtained from patient] : the patient, [unfilled] [FreeTextEntry1] : Pt c/o fall in her house last night and she hurt her right knee. Right knee buckled under her and she fell to the floor. She was able to get up on her own. Ambulating with a cane now. She plans on getting an xray of her right knee tomorrow and will f/u with her orthopedist. She is being considered for revision of her right knee arthroplasty. Pt started with nutritionist named Hamida. She is modifying her meal timing and food types. So far it has been a positive experience. The pt had recent bw and would like to review results. Anxiety has been stable.

## 2024-08-23 ENCOUNTER — APPOINTMENT (OUTPATIENT)
Dept: FAMILY MEDICINE | Facility: CLINIC | Age: 59
End: 2024-08-23
Payer: COMMERCIAL

## 2024-08-23 VITALS
TEMPERATURE: 97.1 F | SYSTOLIC BLOOD PRESSURE: 124 MMHG | BODY MASS INDEX: 50.02 KG/M2 | OXYGEN SATURATION: 95 % | HEART RATE: 86 BPM | HEIGHT: 64 IN | WEIGHT: 293 LBS | RESPIRATION RATE: 17 BRPM | DIASTOLIC BLOOD PRESSURE: 76 MMHG

## 2024-08-23 DIAGNOSIS — M25.30 OTHER INSTABILITY, UNSPECIFIED JOINT: ICD-10-CM

## 2024-08-23 DIAGNOSIS — K21.9 GASTRO-ESOPHAGEAL REFLUX DISEASE W/OUT ESOPHAGITIS: ICD-10-CM

## 2024-08-23 DIAGNOSIS — E66.01 MORBID (SEVERE) OBESITY DUE TO EXCESS CALORIES: ICD-10-CM

## 2024-08-23 DIAGNOSIS — F41.0 PANIC DISORDER [EPISODIC PAROXYSMAL ANXIETY]: ICD-10-CM

## 2024-08-23 DIAGNOSIS — R06.09 OTHER FORMS OF DYSPNEA: ICD-10-CM

## 2024-08-23 DIAGNOSIS — J45.30 MILD PERSISTENT ASTHMA, UNCOMPLICATED: ICD-10-CM

## 2024-08-23 DIAGNOSIS — G47.00 INSOMNIA, UNSPECIFIED: ICD-10-CM

## 2024-08-23 DIAGNOSIS — L21.9 SEBORRHEIC DERMATITIS, UNSPECIFIED: ICD-10-CM

## 2024-08-23 DIAGNOSIS — M17.11 UNILATERAL PRIMARY OSTEOARTHRITIS, RIGHT KNEE: ICD-10-CM

## 2024-08-23 PROCEDURE — 99214 OFFICE O/P EST MOD 30 MIN: CPT

## 2024-08-23 PROCEDURE — G2211 COMPLEX E/M VISIT ADD ON: CPT

## 2024-08-23 RX ORDER — HYDROCORTISONE AND IODOQUINOL 10; 10 MG/G; MG/G
1-1 CREAM TOPICAL TWICE DAILY
Qty: 1 | Refills: 0 | Status: ACTIVE | COMMUNITY
Start: 2024-08-23 | End: 1900-01-01

## 2024-08-23 NOTE — PHYSICAL EXAM
[No Acute Distress] : no acute distress [Normal Sclera/Conjunctiva] : normal sclera/conjunctiva [No Respiratory Distress] : no respiratory distress  [Clear to Auscultation] : lungs were clear to auscultation bilaterally [Normal Rate] : normal rate  [Regular Rhythm] : with a regular rhythm [Normal S1, S2] : normal S1 and S2 [No Murmur] : no murmur heard [No Focal Deficits] : no focal deficits [Normal] : affect was normal and insight and judgment were intact [de-identified] : lymphedema b/l LEs. [de-identified] : seborrhea right brow and nasal bridge.

## 2024-08-23 NOTE — PLAN
[FreeTextEntry1] : F/U 1 month.     Moderate complexity decision making based on pt's multiple chronic medical conditions and polypharmacy.

## 2024-08-23 NOTE — DATA REVIEWED
[FreeTextEntry1] : Abdominal ultrasound 8/16/2024: Limited study. Liver enlargement.  Suggestion of fatty liver infiltration.  No focal liver lesions are seen.  No evidence of gallstones.

## 2024-08-23 NOTE — HISTORY OF PRESENT ILLNESS
[FreeTextEntry1] : Pt is c/o of dyspnea on exertion x 9 months. SOB associated with mild wheezing on occasion. Relief with albuterol. Self d/jalen Breo months ago. Stress test negative 1 yr ago. She is trying to obtain an appt with pulmonology for PFTs as previously recommended. Pt c/o persistent right knee pain. She fell last month when the right knee buckled. She is very frustrated. Anxiety and reflux stable on current medications.

## 2024-09-02 ENCOUNTER — RX RENEWAL (OUTPATIENT)
Age: 59
End: 2024-09-02

## 2024-09-11 ENCOUNTER — RX RENEWAL (OUTPATIENT)
Age: 59
End: 2024-09-11

## 2024-09-18 ENCOUNTER — RX RENEWAL (OUTPATIENT)
Age: 59
End: 2024-09-18

## 2024-09-30 ENCOUNTER — RX RENEWAL (OUTPATIENT)
Age: 59
End: 2024-09-30

## 2024-09-30 RX ORDER — ERGOCALCIFEROL 1.25 MG/1
1.25 MG CAPSULE, LIQUID FILLED ORAL
Qty: 4 | Refills: 0 | Status: ACTIVE | COMMUNITY
Start: 2024-09-30 | End: 1900-01-01

## 2024-10-05 ENCOUNTER — APPOINTMENT (OUTPATIENT)
Dept: OBGYN | Facility: CLINIC | Age: 59
End: 2024-10-05
Payer: COMMERCIAL

## 2024-10-05 VITALS
BODY MASS INDEX: 50.02 KG/M2 | DIASTOLIC BLOOD PRESSURE: 76 MMHG | WEIGHT: 293 LBS | HEIGHT: 64 IN | SYSTOLIC BLOOD PRESSURE: 126 MMHG

## 2024-10-05 DIAGNOSIS — R92.333 MAMMOGRAPHIC HETEROGENEOUS DENSITY, BILATERAL BREASTS: ICD-10-CM

## 2024-10-05 DIAGNOSIS — Z12.4 ENCOUNTER FOR SCREENING FOR MALIGNANT NEOPLASM OF CERVIX: ICD-10-CM

## 2024-10-05 DIAGNOSIS — Z01.419 ENCOUNTER FOR GYNECOLOGICAL EXAMINATION (GENERAL) (ROUTINE) W/OUT ABNORMAL FINDINGS: ICD-10-CM

## 2024-10-05 DIAGNOSIS — Z12.39 ENCOUNTER FOR OTHER SCREENING FOR MALIGNANT NEOPLASM OF BREAST: ICD-10-CM

## 2024-10-05 DIAGNOSIS — D21.9 BENIGN NEOPLASM OF CONNECTIVE AND OTHER SOFT TISSUE, UNSPECIFIED: ICD-10-CM

## 2024-10-05 DIAGNOSIS — M85.80 OTHER SPECIFIED DISORDERS OF BONE DENSITY AND STRUCTURE, UNSPECIFIED SITE: ICD-10-CM

## 2024-10-05 PROCEDURE — 99396 PREV VISIT EST AGE 40-64: CPT

## 2024-10-05 NOTE — PHYSICAL EXAM

## 2024-10-05 NOTE — HISTORY OF PRESENT ILLNESS
[Patient reported PAP Smear was normal] : Patient reported PAP Smear was normal [postmenopausal] : postmenopausal [N] : Patient denies prior pregnancies [Menarche Age: ____] : age at menarche was [unfilled] [No] : Patient does not have concerns regarding sex [Previously active] : previously active [Men] : men [Mammogramdate] : 10/30/2023 [TextBox_19] : br2 [BoneDensityDate] : 10/28/2022 [PapSmeardate] : 09/30/2023 [TextBox_37] : osteopenia 10/28//2022  [ColonoscopyDate] : 08/02/2021 [TextBox_43] : pt states was given retrieval wnl per pt, endoscopy:08/29/2019 mom has colon cancer and passed away from ovarian cancer  [GonorrheaDate] : 06/05/2019 [TextBox_63] : neg  [ChlamydiaDate] : 06/05/2019 [TextBox_68] : neg  [HPVDate] : 09/30/2023 [TextBox_78] : neg  [LMPDate] : 2016 [PGHxTotal] : 0 [FreeTextEntry1] : 2016 <<-----Click here for Discharge Medication Review

## 2024-10-05 NOTE — PLAN
[FreeTextEntry1] : fu TVUS screening- fu mammogram screening fu dexa screening fu annually fu derm TBE

## 2024-10-05 NOTE — HISTORY OF PRESENT ILLNESS
[Patient reported PAP Smear was normal] : Patient reported PAP Smear was normal [postmenopausal] : postmenopausal [N] : Patient denies prior pregnancies [Menarche Age: ____] : age at menarche was [unfilled] [No] : Patient does not have concerns regarding sex [Previously active] : previously active [Men] : men [Mammogramdate] : 10/30/2023 [TextBox_19] : br2 [PapSmeardate] : 09/30/2023 [BoneDensityDate] : 10/28/2022 [TextBox_37] : osteopenia 10/28//2022  [ColonoscopyDate] : 08/02/2021 [TextBox_43] : pt states was given retrieval wnl per pt, endoscopy:08/29/2019 mom has colon cancer and passed away from ovarian cancer  [GonorrheaDate] : 06/05/2019 [TextBox_63] : neg  [ChlamydiaDate] : 06/05/2019 [TextBox_68] : neg  [TextBox_78] : neg  [HPVDate] : 09/30/2023 [LMPDate] : 2016 [PGHxTotal] : 0 [FreeTextEntry1] : 2016

## 2024-10-07 ENCOUNTER — RX RENEWAL (OUTPATIENT)
Age: 59
End: 2024-10-07

## 2024-10-07 LAB — HPV HIGH+LOW RISK DNA PNL CVX: NOT DETECTED

## 2024-10-11 LAB — CYTOLOGY CVX/VAG DOC THIN PREP: NORMAL

## 2024-10-14 ENCOUNTER — RX RENEWAL (OUTPATIENT)
Age: 59
End: 2024-10-14

## 2024-10-31 ENCOUNTER — APPOINTMENT (OUTPATIENT)
Dept: ULTRASOUND IMAGING | Facility: CLINIC | Age: 59
End: 2024-10-31
Payer: COMMERCIAL

## 2024-10-31 ENCOUNTER — OUTPATIENT (OUTPATIENT)
Dept: OUTPATIENT SERVICES | Facility: HOSPITAL | Age: 59
LOS: 1 days | End: 2024-10-31
Payer: COMMERCIAL

## 2024-10-31 ENCOUNTER — APPOINTMENT (OUTPATIENT)
Dept: MAMMOGRAPHY | Facility: CLINIC | Age: 59
End: 2024-10-31
Payer: COMMERCIAL

## 2024-10-31 ENCOUNTER — RESULT REVIEW (OUTPATIENT)
Age: 59
End: 2024-10-31

## 2024-10-31 DIAGNOSIS — Z98.89 OTHER SPECIFIED POSTPROCEDURAL STATES: Chronic | ICD-10-CM

## 2024-10-31 DIAGNOSIS — D21.9 BENIGN NEOPLASM OF CONNECTIVE AND OTHER SOFT TISSUE, UNSPECIFIED: ICD-10-CM

## 2024-10-31 PROCEDURE — 76830 TRANSVAGINAL US NON-OB: CPT

## 2024-10-31 PROCEDURE — 77067 SCR MAMMO BI INCL CAD: CPT | Mod: 26

## 2024-10-31 PROCEDURE — 77063 BREAST TOMOSYNTHESIS BI: CPT | Mod: 26

## 2024-10-31 PROCEDURE — 77067 SCR MAMMO BI INCL CAD: CPT

## 2024-10-31 PROCEDURE — 77063 BREAST TOMOSYNTHESIS BI: CPT

## 2024-10-31 PROCEDURE — 76641 ULTRASOUND BREAST COMPLETE: CPT | Mod: 26,50

## 2024-10-31 PROCEDURE — 76830 TRANSVAGINAL US NON-OB: CPT | Mod: 26

## 2024-10-31 PROCEDURE — 76641 ULTRASOUND BREAST COMPLETE: CPT

## 2024-11-01 NOTE — HISTORY OF PRESENT ILLNESS
[FreeTextEntry1] : Pt c/o stomach virus 1 wk ago. Was exposed to Norovirus and experienced vomiting and diarrhea. Symptoms fully resolved. C/o chronic and worsening fatigue. Patient is also following up on generalized anxiety disorder and insomnia. Suffers from panic attacks and chronic insomnia. Has good results with current regimen. Plans to see GI for f/u of fatty liver and hepatic hemangioma. Did not go for fasting bw yet. Intends to go in a few days.
Bed in lowest position, wheels locked, appropriate side rails in place/Call bell, personal items and telephone in reach/Instruct patient to call for assistance before getting out of bed or chair/Non-slip footwear when patient is out of bed/[address] to call system/Physically safe environment - no spills, clutter or unnecessary equipment/Purposeful Proactive Rounding/Room/bathroom lighting operational, light cord in reach

## 2024-11-08 ENCOUNTER — RX RENEWAL (OUTPATIENT)
Age: 59
End: 2024-11-08

## 2024-12-07 ENCOUNTER — RX RENEWAL (OUTPATIENT)
Age: 59
End: 2024-12-07

## 2025-01-14 NOTE — HISTORY OF PRESENT ILLNESS
[FreeTextEntry1] : Pt is following up on RSV infection. Prednisone 10 mg/d now. Still with cough and mild sob in cold air and with exertion. C/o exacerbation of chronic low back pain. Muscle relaxant helps.
Opt out

## 2025-02-18 ENCOUNTER — RX RENEWAL (OUTPATIENT)
Age: 60
End: 2025-02-18

## 2025-03-18 ENCOUNTER — APPOINTMENT (OUTPATIENT)
Dept: BARIATRICS/WEIGHT MGMT | Facility: CLINIC | Age: 60
End: 2025-03-18
Payer: COMMERCIAL

## 2025-03-18 DIAGNOSIS — K76.0 FATTY (CHANGE OF) LIVER, NOT ELSEWHERE CLASSIFIED: ICD-10-CM

## 2025-03-18 DIAGNOSIS — M19.90 UNSPECIFIED OSTEOARTHRITIS, UNSPECIFIED SITE: ICD-10-CM

## 2025-03-18 DIAGNOSIS — E66.01 MORBID (SEVERE) OBESITY DUE TO EXCESS CALORIES: ICD-10-CM

## 2025-03-18 DIAGNOSIS — E78.5 HYPERLIPIDEMIA, UNSPECIFIED: ICD-10-CM

## 2025-03-18 PROCEDURE — 99214 OFFICE O/P EST MOD 30 MIN: CPT | Mod: 95

## 2025-03-20 RX ORDER — TIRZEPATIDE 2.5 MG/.5ML
2.5 INJECTION, SOLUTION SUBCUTANEOUS
Qty: 1 | Refills: 0 | Status: ACTIVE | COMMUNITY
Start: 2025-03-18

## 2025-04-18 ENCOUNTER — NON-APPOINTMENT (OUTPATIENT)
Age: 60
End: 2025-04-18

## 2025-04-22 ENCOUNTER — APPOINTMENT (OUTPATIENT)
Dept: BARIATRICS/WEIGHT MGMT | Facility: CLINIC | Age: 60
End: 2025-04-22
Payer: COMMERCIAL

## 2025-04-22 VITALS
OXYGEN SATURATION: 95 % | WEIGHT: 293 LBS | HEIGHT: 63.5 IN | RESPIRATION RATE: 16 BRPM | BODY MASS INDEX: 51.27 KG/M2 | SYSTOLIC BLOOD PRESSURE: 117 MMHG | HEART RATE: 83 BPM | TEMPERATURE: 97.4 F | DIASTOLIC BLOOD PRESSURE: 81 MMHG

## 2025-04-22 DIAGNOSIS — G89.29 PAIN IN RIGHT KNEE: ICD-10-CM

## 2025-04-22 DIAGNOSIS — M25.561 PAIN IN RIGHT KNEE: ICD-10-CM

## 2025-04-22 DIAGNOSIS — E66.01 OBESITY, CLASS 3: ICD-10-CM

## 2025-04-22 DIAGNOSIS — E66.01 MORBID (SEVERE) OBESITY DUE TO EXCESS CALORIES: ICD-10-CM

## 2025-04-22 DIAGNOSIS — Z86.39 PERSONAL HISTORY OF OTHER ENDOCRINE, NUTRITIONAL AND METABOLIC DISEASE: ICD-10-CM

## 2025-04-22 DIAGNOSIS — E66.813 OBESITY, CLASS 3: ICD-10-CM

## 2025-04-22 DIAGNOSIS — Z96.651 PAIN IN RIGHT KNEE: ICD-10-CM

## 2025-04-22 PROCEDURE — 99213 OFFICE O/P EST LOW 20 MIN: CPT

## 2025-05-27 ENCOUNTER — APPOINTMENT (OUTPATIENT)
Dept: BARIATRICS/WEIGHT MGMT | Facility: CLINIC | Age: 60
End: 2025-05-27

## 2025-06-02 ENCOUNTER — TRANSCRIPTION ENCOUNTER (OUTPATIENT)
Age: 60
End: 2025-06-02

## 2025-06-03 ENCOUNTER — APPOINTMENT (OUTPATIENT)
Dept: BARIATRICS/WEIGHT MGMT | Facility: CLINIC | Age: 60
End: 2025-06-03
Payer: COMMERCIAL

## 2025-06-03 DIAGNOSIS — E66.813 OBESITY, CLASS 3: ICD-10-CM

## 2025-06-03 DIAGNOSIS — E66.01 OBESITY, CLASS 3: ICD-10-CM

## 2025-06-03 PROCEDURE — 99213 OFFICE O/P EST LOW 20 MIN: CPT | Mod: 95

## 2025-06-09 ENCOUNTER — TRANSCRIPTION ENCOUNTER (OUTPATIENT)
Age: 60
End: 2025-06-09

## 2025-06-13 ENCOUNTER — TRANSCRIPTION ENCOUNTER (OUTPATIENT)
Age: 60
End: 2025-06-13

## 2025-07-15 ENCOUNTER — APPOINTMENT (OUTPATIENT)
Dept: BARIATRICS/WEIGHT MGMT | Facility: CLINIC | Age: 60
End: 2025-07-15
Payer: COMMERCIAL

## 2025-07-15 PROBLEM — R11.2 NAUSEA AND VOMITING: Status: ACTIVE | Noted: 2020-10-05

## 2025-07-15 PROCEDURE — 99213 OFFICE O/P EST LOW 20 MIN: CPT | Mod: 95

## 2025-07-31 ENCOUNTER — TRANSCRIPTION ENCOUNTER (OUTPATIENT)
Age: 60
End: 2025-07-31

## 2025-08-22 ENCOUNTER — RX RENEWAL (OUTPATIENT)
Age: 60
End: 2025-08-22

## 2025-08-26 ENCOUNTER — APPOINTMENT (OUTPATIENT)
Dept: BARIATRICS/WEIGHT MGMT | Facility: CLINIC | Age: 60
End: 2025-08-26

## 2025-09-16 ENCOUNTER — APPOINTMENT (OUTPATIENT)
Dept: BARIATRICS/WEIGHT MGMT | Facility: CLINIC | Age: 60
End: 2025-09-16